# Patient Record
Sex: FEMALE | Race: BLACK OR AFRICAN AMERICAN | NOT HISPANIC OR LATINO | Employment: FULL TIME | URBAN - METROPOLITAN AREA
[De-identification: names, ages, dates, MRNs, and addresses within clinical notes are randomized per-mention and may not be internally consistent; named-entity substitution may affect disease eponyms.]

---

## 2018-01-16 NOTE — RESULT NOTES
Verified Results  * US PELVIS COMPLETE (TRANSABDOMINAL AND TRANSVAGINAL) [id number] 10:15AM Jalyn Dill     Test Name Result Flag Reference   US PELVIS COMPLETE (TRANSABDOMINAL AND TRANSVAGINAL) (Report)     PELVIC ULTRASOUND, COMPLETE     INDICATION: Left lower quadrant pain Patient is postmenopausal        COMPARISON: 12/15/2012     TECHNIQUE:  Transabdominal pelvic ultrasound was performed in sagittal and transverse planes with a curvilinear transducer  Additional transvaginal imaging was performed to better evaluate the endometrium and ovaries  Imaging included volumetric    sweeps as well as traditional still imaging technique  FINDINGS:     UTERUS:   The uterus is normal in position, measuring 6 5 x 2 5 x 3 5 cm  Contour and echotexture appear normal  0 8 cm fibroid in the fundus on the left  The cervix shows no suspicious abnormality  ENDOMETRIUM:    Normal caliber of 2 mm  Homogenous and normal in appearance  OVARIES/ADNEXA:   Right ovary: 1 5 x 0 9 x 1 2 cm  No suspicious right ovarian abnormality  Doppler flow within normal limits  Left ovary: 1 0 x 2 4 x 1 0 cm  No suspicious left ovarian abnormality  Doppler flow within normal limits  No suspicious adnexal mass or loculated collections  Minimal free fluid in the midline  IMPRESSION:      Small uterine fibroid  Minimal free fluid            Workstation performed: [id number]     Signed by:   Arianna Angel MD   3/16/16       Discussion/Summary   Amirah Valdez,   Your pelvic ultrasound shows a small uterine fibroid, but is otherwise normal    Dr Lorenza Hernandez

## 2018-01-17 NOTE — RESULT NOTES
Verified Results  * MAMMO SCREENING BILATERAL W CAD [id number] 09:46AM Rondi Running     Test Name Result Flag Reference   MAMMO SCREENING BILATERAL W CAD (Report)     Patient History:   Patient is postmenopausal    Patient's BMI is 25 1  Reason for exam: screening (asymptomatic)  Mammo Screening Bilateral W CAD: July 25, 2016 - Check In #:    [de-identified]   Bilateral CC and MLO view(s) were taken  Technologist: ALONA Hernandez   Prior study comparison: July 22, 2015, bilateral screening    mammogram performed at [place] [address]  April 11, 2014, bilateral screening mammogram performed at [place] [address]  February 3, 2012, bilateral    screening mammogram performed at [place] [address]  There are scattered fibroglandular densities  No new dominant    soft tissue mass, architectural distortion or suspicious    calcifications are noted  The skin and nipple structures are    within normal limits  No mammographic evidence of malignancy  No    significant changes when compared with prior studies  ASSESSMENT: BiRad:2 - Benign     Recommendation:   Routine screening mammogram of both breasts in 1 year  Analyzed by CAD     8-10% of cancers will be missed on mammography  Management of a    palpable abnormality must be based on clinical grounds  Patients   will be notified of their results via letter from our facility  Accredited by [employer] of [employer] and FDA       Transcription Location: [facility] 98: [id number]     Risk Value(s):   Tyrer-Cuzick 10 Year: 2 636%, Tyrer-Cuzick Lifetime: 6 897%,    Myriad Table: 1 5%, MARCELA 5 Year: 1 1%, NCI Lifetime: 5 5%   Signed by:   Jodee Murguia MD   7/25/16       Discussion/Summary   Rockey Denver,   Your mammogram is normal    Dr Ana Mullen

## 2019-05-17 ENCOUNTER — OFFICE VISIT (OUTPATIENT)
Dept: FAMILY MEDICINE CLINIC | Facility: CLINIC | Age: 64
End: 2019-05-17
Payer: COMMERCIAL

## 2019-05-17 VITALS
WEIGHT: 178 LBS | BODY MASS INDEX: 27.94 KG/M2 | HEIGHT: 67 IN | DIASTOLIC BLOOD PRESSURE: 64 MMHG | TEMPERATURE: 99.1 F | SYSTOLIC BLOOD PRESSURE: 126 MMHG | HEART RATE: 72 BPM | RESPIRATION RATE: 16 BRPM

## 2019-05-17 DIAGNOSIS — R73.01 IMPAIRED FASTING GLUCOSE: Primary | ICD-10-CM

## 2019-05-17 DIAGNOSIS — Z12.31 SCREENING MAMMOGRAM, ENCOUNTER FOR: ICD-10-CM

## 2019-05-17 DIAGNOSIS — Z13.6 SCREENING FOR CARDIOVASCULAR CONDITION: ICD-10-CM

## 2019-05-17 DIAGNOSIS — Z13.0 SCREENING FOR DEFICIENCY ANEMIA: ICD-10-CM

## 2019-05-17 PROCEDURE — 99213 OFFICE O/P EST LOW 20 MIN: CPT | Performed by: FAMILY MEDICINE

## 2019-05-17 PROCEDURE — 3008F BODY MASS INDEX DOCD: CPT | Performed by: FAMILY MEDICINE

## 2019-05-17 PROCEDURE — 1036F TOBACCO NON-USER: CPT | Performed by: FAMILY MEDICINE

## 2019-11-05 ENCOUNTER — OFFICE VISIT (OUTPATIENT)
Dept: FAMILY MEDICINE CLINIC | Facility: CLINIC | Age: 64
End: 2019-11-05

## 2019-11-05 ENCOUNTER — TELEPHONE (OUTPATIENT)
Dept: FAMILY MEDICINE CLINIC | Facility: CLINIC | Age: 64
End: 2019-11-05

## 2019-11-05 VITALS
HEART RATE: 68 BPM | TEMPERATURE: 98.5 F | WEIGHT: 178.4 LBS | SYSTOLIC BLOOD PRESSURE: 138 MMHG | DIASTOLIC BLOOD PRESSURE: 80 MMHG | OXYGEN SATURATION: 100 % | BODY MASS INDEX: 28 KG/M2 | RESPIRATION RATE: 16 BRPM | HEIGHT: 67 IN

## 2019-11-05 DIAGNOSIS — L29.9 ITCHY SCALP: Primary | ICD-10-CM

## 2019-11-05 PROCEDURE — 99213 OFFICE O/P EST LOW 20 MIN: CPT | Performed by: NURSE PRACTITIONER

## 2019-11-05 RX ORDER — HYDROXYZINE HYDROCHLORIDE 25 MG/1
TABLET, FILM COATED ORAL
Qty: 60 TABLET | Refills: 1 | Status: SHIPPED | OUTPATIENT
Start: 2019-11-05 | End: 2020-01-10

## 2019-11-05 RX ORDER — KETOCONAZOLE 20 MG/ML
1 SHAMPOO TOPICAL ONCE
Qty: 100 ML | Status: SHIPPED | OUTPATIENT
Start: 2019-11-05 | End: 2019-11-05

## 2019-11-05 RX ORDER — KETOCONAZOLE 20 MG/ML
1 SHAMPOO TOPICAL 2 TIMES WEEKLY
Qty: 120 ML | Refills: 0 | Status: SHIPPED | OUTPATIENT
Start: 2019-11-07 | End: 2020-02-03 | Stop reason: SDUPTHER

## 2019-11-05 NOTE — PROGRESS NOTES
Assessment/Plan:  If no improvement, will follow up with dermatologist     1  Itchy scalp  -     hydrOXYzine HCL (ATARAX) 25 mg tablet; Take one tablet 3 times a day as needed  -     Ambulatory referral to Dermatology; Future  -     ketoconazole (NIZORAL) 2 % shampoo; Apply 1 application topically 2 (two) times a week          BMI Counseling: Body mass index is 27 94 kg/m²  Discussed the patient's BMI with her  The BMI is above normal  Nutrition recommendations include reducing portion sizes, decreasing overall calorie intake, 3-5 servings of fruits/vegetables daily, reducing fast food intake, consuming healthier snacks, decreasing soda and/or juice intake, moderation in carbohydrate intake and increasing intake of lean protein  Patient Instructions:  Supportive care discussed and advised  Advised to RTO for any worsening and no improvement  Follow up for no improvement and worsening of conditions  Patient advised and educated when to see immediate medical care  Return if symptoms worsen or fail to improve  No future appointments  Subjective:      Patient ID: Jayleen Flood is a 60 y o  female  Chief Complaint   Patient presents with    Itchy Scalp     started 4 days ago, has already bought lice treatments but it doesnt go away  vfrma         Vitals:  /80   Pulse 68   Temp 98 5 °F (36 9 °C)   Resp 16   Ht 5' 7" (1 702 m)   Wt 80 9 kg (178 lb 6 4 oz)   SpO2 100%   BMI 27 94 kg/m²     HPI  Patient stated that started with itching of scalp about 4 days ago  Denies use of any new shampoo and products on hair  Wears hair wigs and denies use oe any new one  Stated that got so worried with itching that cut her hair short and use OTC lice treatment but still having itching  No lice witnessed          The following portions of the patient's history were reviewed and updated as appropriate: allergies, current medications, past family history, past medical history, past social history, past surgical history and problem list       Review of Systems   Constitutional: Negative  Respiratory: Negative  Cardiovascular: Negative  Skin:        As noted in HPI     Neurological: Negative  Objective:    Social History     Tobacco Use   Smoking Status Never Smoker   Smokeless Tobacco Never Used       Allergies: No Known Allergies      Current Outpatient Medications   Medication Sig Dispense Refill    hydrOXYzine HCL (ATARAX) 25 mg tablet Take one tablet 3 times a day as needed 60 tablet 1    [START ON 11/7/2019] ketoconazole (NIZORAL) 2 % shampoo Apply 1 application topically 2 (two) times a week 120 mL 0     No current facility-administered medications for this visit  Physical Exam   Constitutional: She is oriented to person, place, and time  She appears well-developed and well-nourished  Cardiovascular: Normal rate, regular rhythm and normal heart sounds  Pulmonary/Chest: Effort normal and breath sounds normal    Musculoskeletal: Normal range of motion  Neurological: She is alert and oriented to person, place, and time  Skin: Skin is warm and dry  No rash noted  Whole scalp inspected and no rash, dryness and flakes noted  No lice noted and hair is very short and sparse  Psychiatric: She has a normal mood and affect   Her behavior is normal  Judgment and thought content normal                    TEDDY Mujica

## 2019-11-05 NOTE — TELEPHONE ENCOUNTER
Spoke to Patient and she stated that she cut her hair because she thought she had lice  She stated that she did a lice treatment and nothing came out  She stated its still extremely itchy and wants to be seen by a provider  I stated that there are no doctors available however our nurse practioner are  She agreed to be seen by Marleni Marcus    Sending to Marleni Marcus for Jigar Freeman, [place]

## 2019-11-05 NOTE — TELEPHONE ENCOUNTER
Mercedez Sage is calling stating she has something going on in her head  She cut her hair, she doesn't think it lice  Triage since we have no evening apts

## 2019-12-22 ENCOUNTER — HOSPITAL ENCOUNTER (EMERGENCY)
Facility: HOSPITAL | Age: 64
Discharge: HOME | End: 2019-12-22
Attending: EMERGENCY MEDICINE | Admitting: EMERGENCY MEDICINE

## 2019-12-22 ENCOUNTER — APPOINTMENT (EMERGENCY)
Dept: RADIOLOGY | Facility: HOSPITAL | Age: 64
End: 2019-12-22

## 2019-12-22 VITALS
DIASTOLIC BLOOD PRESSURE: 69 MMHG | OXYGEN SATURATION: 99 % | HEART RATE: 58 BPM | TEMPERATURE: 98.6 F | RESPIRATION RATE: 18 BRPM | SYSTOLIC BLOOD PRESSURE: 166 MMHG

## 2019-12-22 DIAGNOSIS — R10.9 ABDOMINAL PAIN: Primary | ICD-10-CM

## 2019-12-22 DIAGNOSIS — M70.70 ILIOPSOAS BURSITIS: ICD-10-CM

## 2019-12-22 LAB
ALBUMIN SERPL BCP-MCNC: 3.5 G/DL (ref 3.5–5)
ALP SERPL-CCNC: 99 U/L (ref 46–116)
ALT SERPL W P-5'-P-CCNC: 33 U/L (ref 12–78)
ANION GAP SERPL CALCULATED.3IONS-SCNC: 3 MMOL/L (ref 4–13)
APTT PPP: 26 SECONDS (ref 23–37)
AST SERPL W P-5'-P-CCNC: 25 U/L (ref 5–45)
BACTERIA UR QL AUTO: ABNORMAL /HPF
BASOPHILS # BLD AUTO: 0.04 THOUSANDS/ΜL (ref 0–0.1)
BASOPHILS NFR BLD AUTO: 1 % (ref 0–1)
BILIRUB SERPL-MCNC: 0.2 MG/DL (ref 0.2–1)
BILIRUB UR QL STRIP: NEGATIVE
BUN SERPL-MCNC: 17 MG/DL (ref 5–25)
CALCIUM SERPL-MCNC: 8.6 MG/DL (ref 8.3–10.1)
CHLORIDE SERPL-SCNC: 108 MMOL/L (ref 100–108)
CLARITY UR: CLEAR
CO2 SERPL-SCNC: 29 MMOL/L (ref 21–32)
COLOR UR: YELLOW
CREAT SERPL-MCNC: 1.19 MG/DL (ref 0.6–1.3)
EOSINOPHIL # BLD AUTO: 0.21 THOUSAND/ΜL (ref 0–0.61)
EOSINOPHIL NFR BLD AUTO: 3 % (ref 0–6)
ERYTHROCYTE [DISTWIDTH] IN BLOOD BY AUTOMATED COUNT: 12.9 % (ref 11.6–15.1)
GFR SERPL CREATININE-BSD FRML MDRD: 57 ML/MIN/1.73SQ M
GLUCOSE SERPL-MCNC: 97 MG/DL (ref 65–140)
GLUCOSE UR STRIP-MCNC: NEGATIVE MG/DL
HCT VFR BLD AUTO: 32.1 % (ref 34.8–46.1)
HGB BLD-MCNC: 10.6 G/DL (ref 11.5–15.4)
HGB UR QL STRIP.AUTO: NEGATIVE
IMM GRANULOCYTES # BLD AUTO: 0.02 THOUSAND/UL (ref 0–0.2)
IMM GRANULOCYTES NFR BLD AUTO: 0 % (ref 0–2)
INR PPP: 0.97 (ref 0.91–1.09)
KETONES UR STRIP-MCNC: NEGATIVE MG/DL
LEUKOCYTE ESTERASE UR QL STRIP: ABNORMAL
LIPASE SERPL-CCNC: 145 U/L (ref 73–393)
LYMPHOCYTES # BLD AUTO: 2.84 THOUSANDS/ΜL (ref 0.6–4.47)
LYMPHOCYTES NFR BLD AUTO: 36 % (ref 14–44)
MCH RBC QN AUTO: 26.8 PG (ref 26.8–34.3)
MCHC RBC AUTO-ENTMCNC: 33 G/DL (ref 31.4–37.4)
MCV RBC AUTO: 81 FL (ref 82–98)
MONOCYTES # BLD AUTO: 0.65 THOUSAND/ΜL (ref 0.17–1.22)
MONOCYTES NFR BLD AUTO: 8 % (ref 4–12)
NEUTROPHILS # BLD AUTO: 4.23 THOUSANDS/ΜL (ref 1.85–7.62)
NEUTS SEG NFR BLD AUTO: 52 % (ref 43–75)
NITRITE UR QL STRIP: NEGATIVE
NON-SQ EPI CELLS URNS QL MICRO: ABNORMAL /HPF
NRBC BLD AUTO-RTO: 0 /100 WBCS
PH UR STRIP.AUTO: 5.5 [PH]
PLATELET # BLD AUTO: 214 THOUSANDS/UL (ref 149–390)
PMV BLD AUTO: 10.5 FL (ref 8.9–12.7)
POTASSIUM SERPL-SCNC: 4 MMOL/L (ref 3.5–5.3)
PROT SERPL-MCNC: 7.4 G/DL (ref 6.4–8.2)
PROT UR STRIP-MCNC: NEGATIVE MG/DL
PROTHROMBIN TIME: 10.5 SECONDS (ref 9.8–12)
RBC # BLD AUTO: 3.96 MILLION/UL (ref 3.81–5.12)
RBC #/AREA URNS AUTO: ABNORMAL /HPF
SODIUM SERPL-SCNC: 140 MMOL/L (ref 136–145)
SP GR UR STRIP.AUTO: 1.01 (ref 1–1.03)
UROBILINOGEN UR QL STRIP.AUTO: 0.2 E.U./DL
WBC # BLD AUTO: 7.99 THOUSAND/UL (ref 4.31–10.16)
WBC #/AREA URNS AUTO: ABNORMAL /HPF

## 2019-12-22 PROCEDURE — 85610 PROTHROMBIN TIME: CPT | Performed by: EMERGENCY MEDICINE

## 2019-12-22 PROCEDURE — 80053 COMPREHEN METABOLIC PANEL: CPT | Performed by: EMERGENCY MEDICINE

## 2019-12-22 PROCEDURE — 85025 COMPLETE CBC W/AUTO DIFF WBC: CPT | Performed by: EMERGENCY MEDICINE

## 2019-12-22 PROCEDURE — 99284 EMERGENCY DEPT VISIT MOD MDM: CPT

## 2019-12-22 PROCEDURE — 81001 URINALYSIS AUTO W/SCOPE: CPT | Performed by: EMERGENCY MEDICINE

## 2019-12-22 PROCEDURE — 85730 THROMBOPLASTIN TIME PARTIAL: CPT | Performed by: EMERGENCY MEDICINE

## 2019-12-22 PROCEDURE — 96361 HYDRATE IV INFUSION ADD-ON: CPT

## 2019-12-22 PROCEDURE — 83690 ASSAY OF LIPASE: CPT | Performed by: EMERGENCY MEDICINE

## 2019-12-22 PROCEDURE — 96360 HYDRATION IV INFUSION INIT: CPT

## 2019-12-22 PROCEDURE — 74177 CT ABD & PELVIS W/CONTRAST: CPT

## 2019-12-22 PROCEDURE — 36415 COLL VENOUS BLD VENIPUNCTURE: CPT | Performed by: EMERGENCY MEDICINE

## 2019-12-22 RX ADMIN — IOHEXOL 100 ML: 350 INJECTION, SOLUTION INTRAVENOUS at 21:11

## 2019-12-22 RX ADMIN — SODIUM CHLORIDE 1000 ML: 0.9 INJECTION, SOLUTION INTRAVENOUS at 20:13

## 2019-12-23 ENCOUNTER — VBI (OUTPATIENT)
Dept: FAMILY MEDICINE CLINIC | Facility: CLINIC | Age: 64
End: 2019-12-23

## 2019-12-23 ENCOUNTER — TELEPHONE (OUTPATIENT)
Dept: FAMILY MEDICINE CLINIC | Facility: CLINIC | Age: 64
End: 2019-12-23

## 2019-12-23 NOTE — ED NOTES
This nurse in the room to see pt, pt is resting comfortably in the stretcher at this time  Pt requested additional warm blankets which were provided  Pt alert and oriented  Pt updated on the status of her care        Berlin Serrano RN  12/22/19 [id number]

## 2019-12-23 NOTE — TELEPHONE ENCOUNTER
----- Message from Mignon Jain MA sent at 12/23/2019  1:04 PM EST -----  Regarding: ED f/up - meds refill  Please outreach to patient  During ED f/up she requested a refill for a medication that was prescribed for a visit 11/05/2019  She would like the hydroxyzine refilled  Patient advised if she is still having the problem she may have to be seen again before med refill, but I would send a message and ask  Her response was that her head is clean but when she stops taking the pill it feels like the itchiness wants to come back  Thank you

## 2019-12-23 NOTE — TELEPHONE ENCOUNTER
Called patient and explained to her that she will need to make a follow up appt with Dr Glo Feldman in order for her to get a refill  I asked patient how many pills she had left   She stated that she only has one pill left  Patient stated that she will call back to schedule  I stated that when she does call back to ask to schedule an appt with Dr Cinthia Oreilly  Patient agreed no further action  Sending to Dr Glo Feldman for [address], [address] [facility] [place]

## 2019-12-23 NOTE — ED PROVIDER NOTES
History  Chief Complaint   Patient presents with    Abdominal Pain     pt c/o lower abdominal pain for past couple days, getting worse, states feels like a stabbing pain     Patient states she had sudden onset of left lower quadrant abdominal pain which was sharp and crampy in nature starting several days ago  Is not associated with nausea vomiting diarrhea  There is no dysuria, no blood per rectum  She had no fever chills  Patient states the pain is worse with certain positions  She has been taking large doses of Tylenol and Advil with mild relief  Prior to Admission Medications   Prescriptions Last Dose Informant Patient Reported? Taking?   hydrOXYzine HCL (ATARAX) 25 mg tablet   No No   Sig: Take one tablet 3 times a day as needed   ketoconazole (NIZORAL) 2 % shampoo   No No   Sig: Apply 1 application topically 2 (two) times a week      Facility-Administered Medications: None       Past Medical History:   Diagnosis Date    H/O antinuclear antibodies     Urinary tract infection     last assessed: 10/24/2013       Past Surgical History:   Procedure Laterality Date    APPENDECTOMY         Family History   Problem Relation Age of Onset    Diabetes Mother      I have reviewed and agree with the history as documented  Social History     Tobacco Use    Smoking status: Never Smoker    Smokeless tobacco: Never Used   Substance Use Topics    Alcohol use: Not on file    Drug use: Not on file        Review of Systems   Constitutional: Negative for chills and fever  HENT: Negative for congestion  Respiratory: Negative for shortness of breath  Cardiovascular: Negative for chest pain  Gastrointestinal: Positive for abdominal pain  Negative for blood in stool, nausea and vomiting  Genitourinary: Negative for dysuria and hematuria  Musculoskeletal: Negative for back pain  Neurological: Negative for weakness and headaches  Hematological: Does not bruise/bleed easily  Psychiatric/Behavioral: Negative for confusion  All other systems reviewed and are negative  Physical Exam   Constitutional: She is oriented to person, place, and time  She appears well-developed and well-nourished  HENT:   Head: Normocephalic and atraumatic  Mouth/Throat: Oropharynx is clear and moist    Eyes: EOM are normal    Cardiovascular: Normal rate, regular rhythm and normal heart sounds  Pulmonary/Chest: Effort normal and breath sounds normal    Abdominal: Normal appearance and bowel sounds are normal  There is tenderness in the left lower quadrant  Neurological: She is alert and oriented to person, place, and time  Skin: Skin is warm and dry  Capillary refill takes less than 2 seconds  Psychiatric: She has a normal mood and affect  Her behavior is normal    Nursing note and vitals reviewed        Vital Signs  ED Triage Vitals [12/22/19 1926]   Temperature Pulse Respirations Blood Pressure SpO2   98 6 °F (37 °C) 65 18 154/68 100 %      Temp Source Heart Rate Source Patient Position - Orthostatic VS BP Location FiO2 (%)   Tympanic Monitor Sitting Left arm --      Pain Score       --           Vitals:    12/22/19 1926   BP: 154/68   Pulse: 65   Patient Position - Orthostatic VS: Sitting         Visual Acuity      ED Medications  Medications   sodium chloride 0 9 % bolus 1,000 mL (has no administration in time range)       Diagnostic Studies  Results Reviewed     Procedure Component Value Units Date/Time    CBC and differential [[id number]]     Lab Status:  No result Specimen:  Blood     Protime-INR [[id number]]     Lab Status:  No result Specimen:  Blood     APTT [[id number]]     Lab Status:  No result Specimen:  Blood     Comprehensive metabolic panel [[id number]]     Lab Status:  No result Specimen:  Blood     Lipase [[id number]]     Lab Status:  No result Specimen:  Blood     UA (URINE) with reflex to Scope [[id number]]  (Abnormal) Collected:  12/22/19 1946    Lab Status:  Final result Specimen: Urine, Clean Catch Updated:  12/22/19 1955     Color, UA Yellow     Clarity, UA Clear     Specific Gravity, UA 1 015     pH, UA 5 5     Leukocytes, UA Small     Nitrite, UA Negative     Protein, UA Negative mg/dl      Glucose, UA Negative mg/dl      Ketones, UA Negative mg/dl      Urobilinogen, UA 0 2 E U /dl      Bilirubin, UA Negative     Blood, UA Negative    Urine Microscopic [[id number]] Collected:  12/22/19 1946    Lab Status: In process Specimen:  Urine, Clean Catch Updated:  12/22/19 1955                 CT abdomen pelvis with contrast    (Results Pending)              Procedures  Procedures         ED Course                               MDM  Number of Diagnoses or Management Options  Diagnosis management comments: Patient is postmenopausal but has a known history of fibroids and possible history of ovarian cysts  Will CT scan the abdomen        Disposition  Final diagnoses:   None     ED Disposition     None      Follow-up Information    None         Patient's Medications   Discharge Prescriptions    No medications on file     No discharge procedures on file      ED Provider  Electronically Signed by           Ramón Da Silva MD  12/22/19 2000

## 2019-12-23 NOTE — TELEPHONE ENCOUNTER
Callie Norris    ED Visit Information     Ed visit date: 12/22/2019  Diagnosis Description: Abdominal pain, Illiopsoas bursitis  In Network? Yes [address] [facility]  Discharge status: Home  Discharged with meds ? No  Number of ED visits to date: 0  ED Severity:NA     Outreach Information    Outreach successful: Yes 1  Date letter mailed:NA  Date Finalized:12/23/2019    Care Coordination    Follow up appointment with pcp: no no  Transportation issues ? 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

## 2020-01-07 ENCOUNTER — TELEPHONE (OUTPATIENT)
Dept: FAMILY MEDICINE CLINIC | Facility: CLINIC | Age: 65
End: 2020-01-07

## 2020-01-10 ENCOUNTER — OFFICE VISIT (OUTPATIENT)
Dept: FAMILY MEDICINE CLINIC | Facility: CLINIC | Age: 65
End: 2020-01-10

## 2020-01-10 VITALS
HEIGHT: 67 IN | TEMPERATURE: 98.4 F | SYSTOLIC BLOOD PRESSURE: 132 MMHG | HEART RATE: 72 BPM | BODY MASS INDEX: 28.09 KG/M2 | DIASTOLIC BLOOD PRESSURE: 80 MMHG | RESPIRATION RATE: 16 BRPM | WEIGHT: 179 LBS

## 2020-01-10 DIAGNOSIS — L29.9 ITCHY SCALP: ICD-10-CM

## 2020-01-10 PROCEDURE — 99213 OFFICE O/P EST LOW 20 MIN: CPT | Performed by: NURSE PRACTITIONER

## 2020-01-10 RX ORDER — HYDROXYZINE HYDROCHLORIDE 25 MG/1
25 TABLET, FILM COATED ORAL 2 TIMES DAILY
Qty: 60 TABLET | Refills: 1 | Status: SHIPPED | OUTPATIENT
Start: 2020-01-10 | End: 2020-04-14 | Stop reason: ALTCHOICE

## 2020-02-03 ENCOUNTER — OFFICE VISIT (OUTPATIENT)
Dept: FAMILY MEDICINE CLINIC | Facility: CLINIC | Age: 65
End: 2020-02-03

## 2020-02-03 VITALS
BODY MASS INDEX: 27.62 KG/M2 | HEIGHT: 67 IN | HEART RATE: 61 BPM | RESPIRATION RATE: 16 BRPM | SYSTOLIC BLOOD PRESSURE: 140 MMHG | DIASTOLIC BLOOD PRESSURE: 80 MMHG | OXYGEN SATURATION: 98 % | WEIGHT: 176 LBS | TEMPERATURE: 99.2 F

## 2020-02-03 DIAGNOSIS — Z00.00 ANNUAL PHYSICAL EXAM: Primary | ICD-10-CM

## 2020-02-03 DIAGNOSIS — L29.9 ITCHY SCALP: ICD-10-CM

## 2020-02-03 DIAGNOSIS — Z13.6 SCREENING FOR CARDIOVASCULAR CONDITION: ICD-10-CM

## 2020-02-03 DIAGNOSIS — Z12.31 SCREENING MAMMOGRAM, ENCOUNTER FOR: ICD-10-CM

## 2020-02-03 DIAGNOSIS — D57.3 SICKLE CELL TRAIT (HCC): ICD-10-CM

## 2020-02-03 DIAGNOSIS — R73.01 IMPAIRED FASTING GLUCOSE: ICD-10-CM

## 2020-02-03 PROBLEM — D64.9 ANEMIA: Status: ACTIVE | Noted: 2020-02-03

## 2020-02-03 PROCEDURE — 99396 PREV VISIT EST AGE 40-64: CPT | Performed by: FAMILY MEDICINE

## 2020-02-03 RX ORDER — KETOCONAZOLE 20 MG/ML
1 SHAMPOO TOPICAL 2 TIMES WEEKLY
Qty: 120 ML | Refills: 3 | Status: SHIPPED | OUTPATIENT
Start: 2020-02-03 | End: 2020-03-31

## 2020-02-03 NOTE — PROGRESS NOTES
FAMILY PRACTICE HEALTH MAINTENANCE OFFICE VISIT  [facility] - [address]    NAME: Sera Lockwood  AGE: 60 y o  SEX: female  : 1955     DATE: 2/3/2020    Assessment and Plan     1  Annual physical exam    2  Impaired fasting glucose  Assessment & Plan:  Last fasting sugar was normal       3  Sickle cell trait [facility])  Assessment & Plan:  She reports that her blood count is stable  Reviewed CBC from 2019      4  Screening for cardiovascular condition  -     Lipid Panel with Direct LDL reflex; Future    5  Screening mammogram, encounter for  -     Mammo screening bilateral w cad; Future; Expected date: 2020    6  Itchy scalp  -     ketoconazole (NIZORAL) 2 % shampoo; Apply 1 application topically 2 (two) times a week      · Patient Counseling:   · Nutrition: Stressed importance of a well balanced diet, moderation of sodium/saturated fat, caloric balance and sufficient intake of fiber  · Exercise: Stressed the importance of regular exercise with a goal of 150 minutes per week  · Dental Health: Discussed daily flossing and brushing and regular dental visits     · Immunizations reviewed: she is going to get her Adacel at the pharmacy  · Discussed benefits of:  Screening labs, mammogram and cervical cancer sreening   BMI Counseling: Body mass index is 27 57 kg/m²  Discussed with patient's BMI with her  The BMI is above normal  Exercise recommendations include exercising 3-5 times per week  Return in about 1 year (around 2/3/2021) for Annual physical         Chief Complaint     Chief Complaint   Patient presents with    Physical Exam     [id number]       History of Present Illness     She is feeling well  She is not getting any more head itching with the hydroxyzine          Well Adult Physical   Patient here for a comprehensive physical exam       Diet and Physical Activity  Diet: well balanced diet  Exercise: frequently      Depression Screen  PHQ-9 Depression Screening    PHQ-9:    Frequency of the following problems over the past two weeks:       Little interest or pleasure in doing things:  0 - not at all  Feeling down, depressed, or hopeless:  0 - not at all  PHQ-2 Score:  0          General Health  Hearing: Normal:  bilateral  Vision: no vision problems  Dental: has dentures, no need for dentist    Reproductive Health  Post-menopausal       The following portions of the patient's history were reviewed and updated as appropriate: allergies, current medications, past family history, past medical history, past social history, past surgical history and problem list     Review of Systems     Review of Systems   Constitutional: Negative  Respiratory: Negative  Cardiovascular: Negative          Past Medical History     Past Medical History:   Diagnosis Date    H/O antinuclear antibodies     Urinary tract infection     last assessed: 10/24/2013       Past Surgical History     Past Surgical History:   Procedure Laterality Date    APPENDECTOMY         Social History     Social History     Socioeconomic History    Marital status: Single     Spouse name: None    Number of children: None    Years of education: None    Highest education level: None   Occupational History    None   Social Needs    Financial resource strain: None    Food insecurity:     Worry: None     Inability: None    Transportation needs:     Medical: None     Non-medical: None   Tobacco Use    Smoking status: Never Smoker    Smokeless tobacco: Never Used   Substance and Sexual Activity    Alcohol use: None    Drug use: None    Sexual activity: None   Lifestyle    Physical activity:     Days per week: None     Minutes per session: None    Stress: None   Relationships    Social connections:     Talks on phone: None     Gets together: None     Attends [employer] service: None     Active member of club or organization: None     Attends meetings of clubs or organizations: None     Relationship status: None    Intimate partner violence:     Fear of current or ex partner: None     Emotionally abused: None     Physically abused: None     Forced sexual activity: None   Other Topics Concern    None   Social History Narrative    None       Family History     Family History   Problem Relation Age of Onset    Diabetes Mother        Current Medications       Current Outpatient Medications:     hydrOXYzine HCL (ATARAX) 25 mg tablet, Take 1 tablet (25 mg total) by mouth 2 (two) times a day, Disp: 60 tablet, Rfl: 1    ketoconazole (NIZORAL) 2 % shampoo, Apply 1 application topically 2 (two) times a week, Disp: 120 mL, Rfl: 3     Allergies     No Known Allergies    Objective     /80   Pulse 61   Temp 99 2 °F (37 3 °C)   Resp 16   Ht 5' 7" (1 702 m)   Wt 79 8 kg (176 lb)   SpO2 98%   BMI 27 57 kg/m²      Physical Exam   Constitutional: She is oriented to person, place, and time  She appears well-developed and well-nourished  HENT:   Head: Normocephalic and atraumatic  Right Ear: External ear normal    Left Ear: External ear normal    Mouth/Throat: Oropharynx is clear and moist    Eyes: Pupils are equal, round, and reactive to light  Neck: Normal range of motion  Cardiovascular: Normal rate, regular rhythm and normal heart sounds  Exam reveals no friction rub  No murmur heard  Pulmonary/Chest: Effort normal and breath sounds normal  No respiratory distress  She has no wheezes  She has no rales  Abdominal: Soft  Bowel sounds are normal  She exhibits no distension and no mass  There is no tenderness  There is no rebound and no guarding  Musculoskeletal: Normal range of motion  She exhibits no edema  Neurological: She is alert and oriented to person, place, and time  She has normal reflexes  Skin: Skin is warm  Nursing note and vitals reviewed           Visual Acuity Screening    Right eye Left eye Both eyes   Without correction: 20/25 20/70 20/25   With correction:              Leti Cardoza Kim Goodwin, [address]

## 2020-03-13 ENCOUNTER — APPOINTMENT (EMERGENCY)
Dept: RADIOLOGY | Facility: HOSPITAL | Age: 65
End: 2020-03-13

## 2020-03-13 ENCOUNTER — HOSPITAL ENCOUNTER (EMERGENCY)
Facility: HOSPITAL | Age: 65
Discharge: HOME | End: 2020-03-13
Attending: EMERGENCY MEDICINE | Admitting: EMERGENCY MEDICINE

## 2020-03-13 VITALS
BODY MASS INDEX: 27.72 KG/M2 | WEIGHT: 177 LBS | OXYGEN SATURATION: 98 % | HEART RATE: 70 BPM | DIASTOLIC BLOOD PRESSURE: 66 MMHG | TEMPERATURE: 99.3 F | RESPIRATION RATE: 15 BRPM | SYSTOLIC BLOOD PRESSURE: 150 MMHG

## 2020-03-13 DIAGNOSIS — R07.9 CHEST PAIN: Primary | ICD-10-CM

## 2020-03-13 DIAGNOSIS — I49.3 PVC (PREMATURE VENTRICULAR CONTRACTION): ICD-10-CM

## 2020-03-13 LAB
ALBUMIN SERPL BCP-MCNC: 3.9 G/DL (ref 3.5–5)
ALP SERPL-CCNC: 103 U/L (ref 46–116)
ALT SERPL W P-5'-P-CCNC: 31 U/L (ref 12–78)
ANION GAP SERPL CALCULATED.3IONS-SCNC: 9 MMOL/L (ref 4–13)
AST SERPL W P-5'-P-CCNC: 17 U/L (ref 5–45)
BASOPHILS # BLD AUTO: 0.05 THOUSANDS/ΜL (ref 0–0.1)
BASOPHILS NFR BLD AUTO: 1 % (ref 0–1)
BILIRUB SERPL-MCNC: 0.3 MG/DL (ref 0.2–1)
BUN SERPL-MCNC: 8 MG/DL (ref 5–25)
CALCIUM SERPL-MCNC: 9.2 MG/DL (ref 8.3–10.1)
CHLORIDE SERPL-SCNC: 104 MMOL/L (ref 100–108)
CO2 SERPL-SCNC: 29 MMOL/L (ref 21–32)
CREAT SERPL-MCNC: 0.88 MG/DL (ref 0.6–1.3)
EOSINOPHIL # BLD AUTO: 0.16 THOUSAND/ΜL (ref 0–0.61)
EOSINOPHIL NFR BLD AUTO: 2 % (ref 0–6)
ERYTHROCYTE [DISTWIDTH] IN BLOOD BY AUTOMATED COUNT: 13.2 % (ref 11.6–15.1)
GFR SERPL CREATININE-BSD FRML MDRD: 81 ML/MIN/1.73SQ M
GLUCOSE SERPL-MCNC: 83 MG/DL (ref 65–140)
HCT VFR BLD AUTO: 34.1 % (ref 34.8–46.1)
HGB BLD-MCNC: 11.4 G/DL (ref 11.5–15.4)
IMM GRANULOCYTES # BLD AUTO: 0.02 THOUSAND/UL (ref 0–0.2)
IMM GRANULOCYTES NFR BLD AUTO: 0 % (ref 0–2)
LYMPHOCYTES # BLD AUTO: 2.82 THOUSANDS/ΜL (ref 0.6–4.47)
LYMPHOCYTES NFR BLD AUTO: 31 % (ref 14–44)
MCH RBC QN AUTO: 27.7 PG (ref 26.8–34.3)
MCHC RBC AUTO-ENTMCNC: 33.4 G/DL (ref 31.4–37.4)
MCV RBC AUTO: 83 FL (ref 82–98)
MONOCYTES # BLD AUTO: 0.64 THOUSAND/ΜL (ref 0.17–1.22)
MONOCYTES NFR BLD AUTO: 7 % (ref 4–12)
NEUTROPHILS # BLD AUTO: 5.28 THOUSANDS/ΜL (ref 1.85–7.62)
NEUTS SEG NFR BLD AUTO: 59 % (ref 43–75)
NRBC BLD AUTO-RTO: 0 /100 WBCS
PLATELET # BLD AUTO: 206 THOUSANDS/UL (ref 149–390)
PMV BLD AUTO: 11.2 FL (ref 8.9–12.7)
POTASSIUM SERPL-SCNC: 3.4 MMOL/L (ref 3.5–5.3)
PROT SERPL-MCNC: 8 G/DL (ref 6.4–8.2)
RBC # BLD AUTO: 4.11 MILLION/UL (ref 3.81–5.12)
SODIUM SERPL-SCNC: 142 MMOL/L (ref 136–145)
TROPONIN I SERPL-MCNC: <0.02 NG/ML
WBC # BLD AUTO: 8.97 THOUSAND/UL (ref 4.31–10.16)

## 2020-03-13 PROCEDURE — 36415 COLL VENOUS BLD VENIPUNCTURE: CPT

## 2020-03-13 PROCEDURE — 99285 EMERGENCY DEPT VISIT HI MDM: CPT | Performed by: EMERGENCY MEDICINE

## 2020-03-13 PROCEDURE — 85025 COMPLETE CBC W/AUTO DIFF WBC: CPT

## 2020-03-13 PROCEDURE — 99285 EMERGENCY DEPT VISIT HI MDM: CPT

## 2020-03-13 PROCEDURE — 71045 X-RAY EXAM CHEST 1 VIEW: CPT

## 2020-03-13 PROCEDURE — 80053 COMPREHEN METABOLIC PANEL: CPT

## 2020-03-13 PROCEDURE — 93005 ELECTROCARDIOGRAM TRACING: CPT

## 2020-03-13 PROCEDURE — 84484 ASSAY OF TROPONIN QUANT: CPT

## 2020-03-13 NOTE — ED PROVIDER NOTES
History  Chief Complaint   Patient presents with    Chest Pain     c/o slight chest discomfort and feeling like heart racing for a couple of days  concerned as it is not going away     HPI    This is 61 yo F who presents today with chest pain  Patient states slight chest discomfort and feels like heart racing for couple of days  States feels palpitations where skipped beat  States increased stress lately  No shortness of breath  No fevers  No weakness  States it has happened before but resolved  No cough  Impression; 61 yo F with chest pain  Will do cardiac workup  Patient had few PVC which she described is the chest pain she has been experiencing  Discussed infrequent pvc  Will do cardiac workup     Prior to Admission Medications   Prescriptions Last Dose Informant Patient Reported? Taking?   hydrOXYzine HCL (ATARAX) 25 mg tablet   No No   Sig: Take 1 tablet (25 mg total) by mouth 2 (two) times a day   Patient taking differently: Take 25 mg by mouth as needed    ketoconazole (NIZORAL) 2 % shampoo   No No   Sig: Apply 1 application topically 2 (two) times a week      Facility-Administered Medications: None       Past Medical History:   Diagnosis Date    H/O antinuclear antibodies     Urinary tract infection     last assessed: 10/24/2013       Past Surgical History:   Procedure Laterality Date    APPENDECTOMY         Family History   Problem Relation Age of Onset    Diabetes Mother      I have reviewed and agree with the history as documented  E-Cigarette/Vaping    E-Cigarette Use Never User      E-Cigarette/Vaping Substances     Social History     Tobacco Use    Smoking status: Never Smoker    Smokeless tobacco: Never Used   Substance Use Topics    Alcohol use: Not Currently    Drug use: Not Currently       Review of Systems   Constitutional: Negative  Negative for diaphoresis and fever  HENT: Negative  Respiratory: Negative  Negative for cough, shortness of breath and wheezing  Cardiovascular: Positive for chest pain and palpitations  Negative for leg swelling  Gastrointestinal: Negative for abdominal distention, abdominal pain, nausea and vomiting  Genitourinary: Negative  Musculoskeletal: Negative  Skin: Negative  Neurological: Negative  Psychiatric/Behavioral: Negative  All other systems reviewed and are negative  Physical Exam  Physical Exam   Constitutional: She is oriented to person, place, and time  She appears well-developed and well-nourished  HENT:   Head: Normocephalic and atraumatic  Eyes: Pupils are equal, round, and reactive to light  EOM are normal    Neck: Normal range of motion  Neck supple  Cardiovascular: Normal rate, regular rhythm and normal heart sounds  No murmur heard  Pulmonary/Chest: Effort normal and breath sounds normal  No stridor  No respiratory distress  Abdominal: Soft  Bowel sounds are normal  She exhibits no distension  There is no tenderness  There is no rebound and no guarding  Musculoskeletal: Normal range of motion  She exhibits no edema  Neurological: She is alert and oriented to person, place, and time  Skin: Skin is warm and dry  Capillary refill takes less than 2 seconds  No rash noted  Psychiatric: She has a normal mood and affect  Vitals reviewed        Vital Signs  ED Triage Vitals [03/13/20 1808]   Temperature Pulse Respirations Blood Pressure SpO2   99 3 °F (37 4 °C) 76 20 137/63 99 %      Temp Source Heart Rate Source Patient Position - Orthostatic VS BP Location FiO2 (%)   Tympanic Monitor Sitting Right arm --      Pain Score       1           Vitals:    03/13/20 1845 03/13/20 1915 03/13/20 1945 03/13/20 2015   BP: 146/67 142/62 144/65 150/66   Pulse: 72 72 72 70   Patient Position - Orthostatic VS:             Visual Acuity      ED Medications  Medications - No data to display    Diagnostic Studies  Results Reviewed     Procedure Component Value Units Date/Time    Troponin I [[id number]] (Normal) Collected:  03/13/20 1838    Lab Status:  Final result Specimen:  Blood from Arm, Left Updated:  03/13/20 1903     Troponin I <0 02 ng/mL     Comprehensive metabolic panel [[id number]]  (Abnormal) Collected:  03/13/20 1838    Lab Status:  Final result Specimen:  Blood from Arm, Left Updated:  03/13/20 1903     Sodium 142 mmol/L      Potassium 3 4 mmol/L      Chloride 104 mmol/L      CO2 29 mmol/L      ANION GAP 9 mmol/L      BUN 8 mg/dL      Creatinine 0 88 mg/dL      Glucose 83 mg/dL      Calcium 9 2 mg/dL      AST 17 U/L      ALT 31 U/L      Alkaline Phosphatase 103 U/L      Total Protein 8 0 g/dL      Albumin 3 9 g/dL      Total Bilirubin 0 30 mg/dL      eGFR 81 ml/min/1 73sq m     Narrative:       [place] guidelines for Chronic Kidney Disease (CKD):     Stage 1 with normal or high GFR (GFR > 90 mL/min/1 73 square meters)    Stage 2 Mild CKD (GFR = 60-89 mL/min/1 73 square meters)    Stage 3A Moderate CKD (GFR = 45-59 mL/min/1 73 square meters)    Stage 3B Moderate CKD (GFR = 30-44 mL/min/1 73 square meters)    Stage 4 Severe CKD (GFR = 15-29 mL/min/1 73 square meters)    Stage 5 End Stage CKD (GFR <15 mL/min/1 73 square meters)  Note: GFR calculation is accurate only with a steady state creatinine    CBC and differential [[id number]]  (Abnormal) Collected:  03/13/20 1838    Lab Status:  Final result Specimen:  Blood from Arm, Left Updated:  03/13/20 1844     WBC 8 97 Thousand/uL      RBC 4 11 Million/uL      Hemoglobin 11 4 g/dL      Hematocrit 34 1 %      MCV 83 fL      MCH 27 7 pg      MCHC 33 4 g/dL      RDW 13 2 %      MPV 11 2 fL      Platelets 769 Thousands/uL      nRBC 0 /100 WBCs      Neutrophils Relative 59 %      Immat GRANS % 0 %      Lymphocytes Relative 31 %      Monocytes Relative 7 %      Eosinophils Relative 2 %      Basophils Relative 1 %      Neutrophils Absolute 5 28 Thousands/µL      Immature Grans Absolute 0 02 Thousand/uL      Lymphocytes Absolute 2 82 Thousands/µL      Monocytes Absolute 0 64 Thousand/µL      Eosinophils Absolute 0 16 Thousand/µL      Basophils Absolute 0 05 Thousands/µL                  XR chest 1 view portable   Final Result by Marin Lowry DO (03/14 1411)      No acute cardiopulmonary disease  Workstation performed: [id number]                    Procedures  Procedures         ED Course  ED Course as of Mar 14 1529   Katherine Martin Mar 13, 2020   1840 Procedure Note: EKG  Date/Time: 03/13/20 6:41 PM   Performed by: Tony Ryder by: Delphine Dempsey   Indications / Diagnosis: CP  ECG reviewed by me, the ED Provider: yes   The EKG demonstrates:  Rhythm: normal sinus  Intervals: normal intervals  Axis: normal axis  QRS/Blocks: normal QRS  ST Changes: No acute ST Changes, no STD/[place]  HEART Risk Score      Most Recent Value   Heart Score Risk Calculator   History  0 Filed at: 03/14/2020 1529   ECG  0 Filed at: 03/14/2020 1529   Age  1 Filed at: 03/14/2020 1529   Risk Factors  0 Filed at: 03/14/2020 1529   Troponin  0 Filed at: 03/14/2020 1529   HEART Score  1 Filed at: 03/14/2020 1529                              MDM      Disposition  Final diagnoses:   Chest pain   PVC (premature ventricular contraction)     Time reflects when diagnosis was documented in both MDM as applicable and the Disposition within this note     Time User Action Codes Description Comment    3/13/2020  8:32 PM Manasa Balloon Add [R07 9] Chest pain     3/13/2020  8:32 PM Rodrigo Anderson 63 Add [I49 3] PVC (premature ventricular contraction)       ED Disposition     ED Disposition Condition Date/Time Comment    Discharge Stable Fri Mar 13, 2020  8:32 PM Cherry Cobb discharge to home/self care              Follow-up Information     Follow up With Specialties Details Why Contact Info Additional Information    [facility] Emergency Department Emergency Medicine  If symptoms worsen [address] [facility] [address]  [phone] [facility] ED, [place], [place], [address] [facility] Schedule an appointment as soon as possible for a visit   [address] [facility], [place] [address] [facility] [address] [facility] [place], [address], [address] [place], [place], [address] [facility] [place]          Discharge Medication List as of 3/13/2020  8:34 PM      CONTINUE these medications which have NOT CHANGED    Details   hydrOXYzine HCL (ATARAX) 25 mg tablet Take 1 tablet (25 mg total) by mouth 2 (two) times a day, Starting Fri 1/10/2020, Normal      ketoconazole (NIZORAL) 2 % shampoo Apply 1 application topically 2 (two) times a week, Starting Mon 2/3/2020, Normal           No discharge procedures on file      PDMP Review     None          ED Provider  Electronically Signed by           Nay Ball MD  03/14/20 4395

## 2020-03-14 NOTE — DISCHARGE INSTRUCTIONS
Your labwork was normal  Please followup with family doctor and if repeat symptoms cardiology   Severe symptoms come back to the ED

## 2020-03-19 LAB
ATRIAL RATE: 69 BPM
P AXIS: 55 DEGREES
PR INTERVAL: 144 MS
QRS AXIS: 19 DEGREES
QRSD INTERVAL: 66 MS
QT INTERVAL: 404 MS
QTC INTERVAL: 432 MS
T WAVE AXIS: 48 DEGREES
VENTRICULAR RATE: 69 BPM

## 2020-03-19 PROCEDURE — 93010 ELECTROCARDIOGRAM REPORT: CPT | Performed by: INTERNAL MEDICINE

## 2020-03-30 ENCOUNTER — TELEPHONE (OUTPATIENT)
Dept: FAMILY MEDICINE CLINIC | Facility: CLINIC | Age: 65
End: 2020-03-30

## 2020-03-30 ENCOUNTER — TELEMEDICINE (OUTPATIENT)
Dept: FAMILY MEDICINE CLINIC | Facility: CLINIC | Age: 65
End: 2020-03-30

## 2020-03-30 VITALS — BODY MASS INDEX: 27.78 KG/M2 | WEIGHT: 177 LBS | HEIGHT: 67 IN

## 2020-03-30 DIAGNOSIS — D57.3 SICKLE CELL TRAIT (HCC): Primary | ICD-10-CM

## 2020-03-30 DIAGNOSIS — B34.9 VIRAL ILLNESS: ICD-10-CM

## 2020-03-30 PROCEDURE — G2012 BRIEF CHECK IN BY MD/QHP: HCPCS | Performed by: FAMILY MEDICINE

## 2020-03-30 NOTE — PROGRESS NOTES
Virtual Regular Visit    Problem List Items Addressed This Visit     Sickle cell trait ([address] ) - Primary     Stable  Reviewed recent labs            Other Visit Diagnoses     Viral illness        new, supportive measures reviewed  she will have her work check her pressure  Reason for visit is dizziness     Encounter provider Armida Bettencourt DO    Provider located at [address]  [address] [facility]  [address]  [place] [address]      Recent Visits  No visits were found meeting these conditions  Showing recent visits within past 7 days and meeting all other requirements     Today's Visits  Date Type Provider Dept   03/30/20 Telephone [address]   03/30/20 [facility] today's visits and meeting all other requirements     Future Appointments  Date Type Provider Dept   03/30/20 Telephone [address]   Showing future appointments within next 150 days and meeting all other requirements        The patient was identified by name and date of birth  Krista Man was informed that this is a telemedicine visit and that the visit is being conducted through [employer] and patient was informed that this is not a secure, HIPAA-complaint platform  she agrees to proceed     My office door was closed  No one else was in the room  She acknowledged consent and understanding of privacy and security of the video platform  The patient has agreed to participate and understands they can discontinue the visit at any time  Patient is aware this is a billable service  Subjective  Krista Man is a 60 y o  female     She is getting hot and cold sweats and body aches  She feels better today than she does yesterday  She works in a nursing home  She has not been exposed to anyone with Corona virus  The patient's she works with have a cold going around       She was recently in the emergency room and had been checked out there  She has been getting her temperature checked every day when she does work  She has not had a fever  She feels like she is finding something off  Past Medical History:   Diagnosis Date    H/O antinuclear antibodies     Urinary tract infection     last assessed: 10/24/2013       Past Surgical History:   Procedure Laterality Date    APPENDECTOMY         Current Outpatient Medications   Medication Sig Dispense Refill    hydrOXYzine HCL (ATARAX) 25 mg tablet Take 1 tablet (25 mg total) by mouth 2 (two) times a day (Patient taking differently: Take 25 mg by mouth as needed ) 60 tablet 1    ketoconazole (NIZORAL) 2 % shampoo Apply 1 application topically 2 (two) times a week 120 mL 3     No current facility-administered medications for this visit  No Known Allergies    Review of Systems   Constitutional: Negative for fever  Respiratory: Negative for cough and shortness of breath  Neurological: Positive for dizziness  Negative for headaches  Physical Exam   Constitutional: She appears well-nourished  No distress  Pulmonary/Chest: Effort normal    Psychiatric: She has a normal mood and affect  Her behavior is normal  Judgment and thought content normal         I spent 14 minutes with the patient during this visit

## 2020-03-30 NOTE — TELEPHONE ENCOUNTER
----- Message from Magali Vazquez sent at 3/30/2020  4:29 AM EDT -----  Regarding: RE: Non-Urgent Medical Question  Hi Dr Gomez,  i haven't been feeling well , for a few days now,  i work at [employer] and they our temp every morning before reporting to the unit  I have  all the common feelings of a cold but no fever  We have been working extra short  every day  so i am wondering if it just that    ----- Message -----  From: Power Gutierrez DO  Sent: 2/4/2020  8:16 AM EST  To: Magali Vazquez  Subject: RE: Non-Urgent Medical Question  Good morning,  The screening is the code for screening for cholesterol  Power Gutierrez DO     ----- Message -----     From: Magali Vazquez     Sent: 2/3/2020  6:50 PM EST       To: Power Gutierrez DO  Subject: Non-Urgent Medical Question    Hi DR Sailaja Herron , why do i have to go for  screening for Cardiovascular  condition ?

## 2020-03-31 ENCOUNTER — APPOINTMENT (INPATIENT)
Dept: NON INVASIVE DIAGNOSTICS | Facility: HOSPITAL | Age: 65
DRG: 179 | End: 2020-03-31
Payer: OTHER GOVERNMENT

## 2020-03-31 ENCOUNTER — APPOINTMENT (EMERGENCY)
Dept: RADIOLOGY | Facility: HOSPITAL | Age: 65
DRG: 179 | End: 2020-03-31
Payer: OTHER GOVERNMENT

## 2020-03-31 ENCOUNTER — APPOINTMENT (INPATIENT)
Dept: RADIOLOGY | Facility: HOSPITAL | Age: 65
DRG: 179 | End: 2020-03-31
Payer: OTHER GOVERNMENT

## 2020-03-31 ENCOUNTER — HOSPITAL ENCOUNTER (INPATIENT)
Facility: HOSPITAL | Age: 65
LOS: 1 days | Discharge: HOME | DRG: 179 | End: 2020-04-01
Attending: EMERGENCY MEDICINE | Admitting: INTERNAL MEDICINE
Payer: OTHER GOVERNMENT

## 2020-03-31 DIAGNOSIS — R42 DIZZINESS: Primary | ICD-10-CM

## 2020-03-31 DIAGNOSIS — I63.9 CVA (CEREBRAL VASCULAR ACCIDENT) (HCC): ICD-10-CM

## 2020-03-31 PROBLEM — R65.10 SIRS (SYSTEMIC INFLAMMATORY RESPONSE SYNDROME) (HCC): Status: ACTIVE | Noted: 2020-03-31

## 2020-03-31 PROBLEM — R07.89 CHEST TIGHTNESS: Status: ACTIVE | Noted: 2020-03-31

## 2020-03-31 LAB
ALBUMIN SERPL BCP-MCNC: 3.7 G/DL (ref 3.5–5)
ALP SERPL-CCNC: 102 U/L (ref 46–116)
ALT SERPL W P-5'-P-CCNC: 53 U/L (ref 12–78)
ANION GAP SERPL CALCULATED.3IONS-SCNC: 8 MMOL/L (ref 4–13)
APTT PPP: 27 SECONDS (ref 23–37)
AST SERPL W P-5'-P-CCNC: 36 U/L (ref 5–45)
ATRIAL RATE: 85 BPM
BASOPHILS # BLD AUTO: 0.02 THOUSANDS/ΜL (ref 0–0.1)
BASOPHILS NFR BLD AUTO: 0 % (ref 0–1)
BILIRUB SERPL-MCNC: 0.3 MG/DL (ref 0.2–1)
BUN SERPL-MCNC: 9 MG/DL (ref 5–25)
CALCIUM SERPL-MCNC: 9.2 MG/DL (ref 8.3–10.1)
CHLORIDE SERPL-SCNC: 101 MMOL/L (ref 100–108)
CO2 SERPL-SCNC: 28 MMOL/L (ref 21–32)
CREAT SERPL-MCNC: 1.1 MG/DL (ref 0.6–1.3)
EOSINOPHIL # BLD AUTO: 0.01 THOUSAND/ΜL (ref 0–0.61)
EOSINOPHIL NFR BLD AUTO: 0 % (ref 0–6)
ERYTHROCYTE [DISTWIDTH] IN BLOOD BY AUTOMATED COUNT: 13.2 % (ref 11.6–15.1)
FLUAV RNA NPH QL NAA+PROBE: NORMAL
FLUBV RNA NPH QL NAA+PROBE: NORMAL
GFR SERPL CREATININE-BSD FRML MDRD: 62 ML/MIN/1.73SQ M
GLUCOSE SERPL-MCNC: 117 MG/DL (ref 65–140)
HCT VFR BLD AUTO: 37.7 % (ref 34.8–46.1)
HGB BLD-MCNC: 12.6 G/DL (ref 11.5–15.4)
IMM GRANULOCYTES # BLD AUTO: 0.01 THOUSAND/UL (ref 0–0.2)
IMM GRANULOCYTES NFR BLD AUTO: 0 % (ref 0–2)
INR PPP: 0.92 (ref 0.84–1.19)
LYMPHOCYTES # BLD AUTO: 1.28 THOUSANDS/ΜL (ref 0.6–4.47)
LYMPHOCYTES NFR BLD AUTO: 21 % (ref 14–44)
MCH RBC QN AUTO: 27.5 PG (ref 26.8–34.3)
MCHC RBC AUTO-ENTMCNC: 33.4 G/DL (ref 31.4–37.4)
MCV RBC AUTO: 82 FL (ref 82–98)
MONOCYTES # BLD AUTO: 0.52 THOUSAND/ΜL (ref 0.17–1.22)
MONOCYTES NFR BLD AUTO: 8 % (ref 4–12)
NEUTROPHILS # BLD AUTO: 4.36 THOUSANDS/ΜL (ref 1.85–7.62)
NEUTS SEG NFR BLD AUTO: 71 % (ref 43–75)
NRBC BLD AUTO-RTO: 0 /100 WBCS
P AXIS: 50 DEGREES
PLATELET # BLD AUTO: 183 THOUSANDS/UL (ref 149–390)
PMV BLD AUTO: 12 FL (ref 8.9–12.7)
POTASSIUM SERPL-SCNC: 3.4 MMOL/L (ref 3.5–5.3)
PR INTERVAL: 132 MS
PROT SERPL-MCNC: 8.2 G/DL (ref 6.4–8.2)
PROTHROMBIN TIME: 12.6 SECONDS (ref 11.6–14.5)
QRS AXIS: 15 DEGREES
QRSD INTERVAL: 62 MS
QT INTERVAL: 372 MS
QTC INTERVAL: 442 MS
RBC # BLD AUTO: 4.59 MILLION/UL (ref 3.81–5.12)
RSV RNA NPH QL NAA+PROBE: NORMAL
SODIUM SERPL-SCNC: 137 MMOL/L (ref 136–145)
T WAVE AXIS: 37 DEGREES
VENTRICULAR RATE: 85 BPM
WBC # BLD AUTO: 6.2 THOUSAND/UL (ref 4.31–10.16)

## 2020-03-31 PROCEDURE — 93005 ELECTROCARDIOGRAM TRACING: CPT

## 2020-03-31 PROCEDURE — 85025 COMPLETE CBC W/AUTO DIFF WBC: CPT | Performed by: EMERGENCY MEDICINE

## 2020-03-31 PROCEDURE — 96374 THER/PROPH/DIAG INJ IV PUSH: CPT

## 2020-03-31 PROCEDURE — 71045 X-RAY EXAM CHEST 1 VIEW: CPT

## 2020-03-31 PROCEDURE — 70551 MRI BRAIN STEM W/O DYE: CPT

## 2020-03-31 PROCEDURE — 36415 COLL VENOUS BLD VENIPUNCTURE: CPT | Performed by: EMERGENCY MEDICINE

## 2020-03-31 PROCEDURE — 85730 THROMBOPLASTIN TIME PARTIAL: CPT | Performed by: EMERGENCY MEDICINE

## 2020-03-31 PROCEDURE — 93010 ELECTROCARDIOGRAM REPORT: CPT | Performed by: INTERNAL MEDICINE

## 2020-03-31 PROCEDURE — 87040 BLOOD CULTURE FOR BACTERIA: CPT | Performed by: INTERNAL MEDICINE

## 2020-03-31 PROCEDURE — 87631 RESP VIRUS 3-5 TARGETS: CPT | Performed by: INTERNAL MEDICINE

## 2020-03-31 PROCEDURE — 93306 TTE W/DOPPLER COMPLETE: CPT | Performed by: INTERNAL MEDICINE

## 2020-03-31 PROCEDURE — 99284 EMERGENCY DEPT VISIT MOD MDM: CPT | Performed by: EMERGENCY MEDICINE

## 2020-03-31 PROCEDURE — 80053 COMPREHEN METABOLIC PANEL: CPT | Performed by: EMERGENCY MEDICINE

## 2020-03-31 PROCEDURE — 70496 CT ANGIOGRAPHY HEAD: CPT

## 2020-03-31 PROCEDURE — 70498 CT ANGIOGRAPHY NECK: CPT

## 2020-03-31 PROCEDURE — 99255 IP/OBS CONSLTJ NEW/EST HI 80: CPT | Performed by: PSYCHIATRY & NEUROLOGY

## 2020-03-31 PROCEDURE — 87081 CULTURE SCREEN ONLY: CPT | Performed by: INTERNAL MEDICINE

## 2020-03-31 PROCEDURE — 85610 PROTHROMBIN TIME: CPT | Performed by: EMERGENCY MEDICINE

## 2020-03-31 PROCEDURE — 99223 1ST HOSP IP/OBS HIGH 75: CPT | Performed by: INTERNAL MEDICINE

## 2020-03-31 PROCEDURE — 97163 PT EVAL HIGH COMPLEX 45 MIN: CPT

## 2020-03-31 PROCEDURE — 93306 TTE W/DOPPLER COMPLETE: CPT

## 2020-03-31 PROCEDURE — 99285 EMERGENCY DEPT VISIT HI MDM: CPT

## 2020-03-31 RX ORDER — ASPIRIN 81 MG/1
324 TABLET, CHEWABLE ORAL ONCE
Status: COMPLETED | OUTPATIENT
Start: 2020-03-31 | End: 2020-03-31

## 2020-03-31 RX ORDER — LANOLIN ALCOHOL/MO/W.PET/CERES
6 CREAM (GRAM) TOPICAL
Status: DISCONTINUED | OUTPATIENT
Start: 2020-03-31 | End: 2020-04-01 | Stop reason: HOSPADM

## 2020-03-31 RX ORDER — ONDANSETRON 2 MG/ML
4 INJECTION INTRAMUSCULAR; INTRAVENOUS ONCE
Status: COMPLETED | OUTPATIENT
Start: 2020-03-31 | End: 2020-03-31

## 2020-03-31 RX ORDER — MECLIZINE HYDROCHLORIDE 25 MG/1
25 TABLET ORAL ONCE
Status: COMPLETED | OUTPATIENT
Start: 2020-03-31 | End: 2020-03-31

## 2020-03-31 RX ORDER — MECLIZINE HYDROCHLORIDE 25 MG/1
25 TABLET ORAL EVERY 8 HOURS PRN
Status: DISCONTINUED | OUTPATIENT
Start: 2020-03-31 | End: 2020-04-01 | Stop reason: HOSPADM

## 2020-03-31 RX ORDER — ACETAMINOPHEN 325 MG/1
650 TABLET ORAL EVERY 6 HOURS PRN
Status: DISCONTINUED | OUTPATIENT
Start: 2020-03-31 | End: 2020-04-01 | Stop reason: HOSPADM

## 2020-03-31 RX ORDER — POTASSIUM CHLORIDE 20 MEQ/1
40 TABLET, EXTENDED RELEASE ORAL ONCE
Status: COMPLETED | OUTPATIENT
Start: 2020-03-31 | End: 2020-03-31

## 2020-03-31 RX ORDER — ACETAMINOPHEN 325 MG/1
650 TABLET ORAL EVERY 6 HOURS PRN
Status: DISCONTINUED | OUTPATIENT
Start: 2020-03-31 | End: 2020-03-31

## 2020-03-31 RX ORDER — ATORVASTATIN CALCIUM 40 MG/1
40 TABLET, FILM COATED ORAL EVERY EVENING
Status: DISCONTINUED | OUTPATIENT
Start: 2020-03-31 | End: 2020-04-01 | Stop reason: HOSPADM

## 2020-03-31 RX ORDER — ASPIRIN 325 MG
325 TABLET ORAL DAILY
Status: DISCONTINUED | OUTPATIENT
Start: 2020-04-01 | End: 2020-03-31 | Stop reason: ALTCHOICE

## 2020-03-31 RX ADMIN — POTASSIUM CHLORIDE 40 MEQ: 1500 TABLET, EXTENDED RELEASE ORAL at 17:23

## 2020-03-31 RX ADMIN — ENOXAPARIN SODIUM 40 MG: 40 INJECTION SUBCUTANEOUS at 12:32

## 2020-03-31 RX ADMIN — ACETAMINOPHEN 650 MG: 325 TABLET ORAL at 12:32

## 2020-03-31 RX ADMIN — ATORVASTATIN CALCIUM 40 MG: 40 TABLET, FILM COATED ORAL at 17:24

## 2020-03-31 RX ADMIN — MELATONIN 6 MG: at 21:12

## 2020-03-31 RX ADMIN — MECLIZINE HYDROCHLORIDE 25 MG: 25 TABLET ORAL at 05:53

## 2020-03-31 RX ADMIN — ONDANSETRON 4 MG: 2 INJECTION INTRAMUSCULAR; INTRAVENOUS at 07:47

## 2020-03-31 RX ADMIN — ASPIRIN 81 MG 324 MG: 81 TABLET ORAL at 07:33

## 2020-03-31 RX ADMIN — ACETAMINOPHEN 650 MG: 325 TABLET ORAL at 19:52

## 2020-03-31 RX ADMIN — IOHEXOL 85 ML: 350 INJECTION, SOLUTION INTRAVENOUS at 05:59

## 2020-03-31 NOTE — PHYSICAL THERAPY NOTE
PT EVALUATION       03/31/20 1325   Note Type   Note type Eval/Treat   Pain Assessment   Pain Assessment Tool Wells-Baker FACES   Wells-Baker FACES Pain Rating 4   Pain Location/Orientation   ("I feel achy all over")   Home Living   Type of [address] [place] [address] Two level;1/2 bath on main level;Bed/bath upstairs; Able to live on main level with bedroom/bathroom  (few steps to enter)   [address]   (none)   Prior Function   Level of [place] Independent with ADLs and functional mobility   Lives With Spouse   ADL Assistance Independent   Vocational   ([address])   Restrictions/Precautions   Other Precautions Pain; Fall Risk   General   Additional Pertinent History Pt admitted with a few day history of malaise, now with c/o dizziness and back pain  MRI brain negative for acute CVA  Cognition   Overall Cognitive Status WFL   RLE Assessment   RLE Assessment   (ROM WFL, MMT 4+/5)   LLE Assessment   LLE Assessment   (ROM WFL, MMT 4+/5)   Bed Mobility   Rolling R 5  Supervision   Rolling L 5  Supervision   Supine to Sit 5  Supervision   Sit to Supine 5  Supervision   Transfers   Sit to Stand 4  Minimal assistance   Stand to Sit 4  Minimal assistance   Ambulation/Elevation   Gait Assistance 4  Minimal assist   Assistive Device   (none)   Distance few steps to the head of the bed   Balance   Static Sitting Fair +   Dynamic Sitting Fair   Static Standing Fair   Dynamic Standing Fair   Ambulatory Fair -   Assessment   Prognosis Good   Problem List Decreased strength;Decreased endurance; Impaired balance;Decreased mobility   Assessment Patient seen for Physical Therapy evaluation  Patient admitted with Dizziness  Comorbidities affecting patient's physical performance include:sickle cell trait     Personal factors affecting patient at time of initial evaluation include: lives in 2 story house, stairs to enter home, inability to ambulate household distances, inability to navigate level surfaces without external assistance, inability to perform dynamic tasks in community, inability to perform current job functions and inability to perform ADLS  Prior to admission, patient was independent with functional mobility without assistive device, independent with ADLS and works full time  Please find objective findings from Physical Therapy assessment regarding body systems outlined above with impairments and limitations including weakness, impaired balance, pain, decreased activity tolerance, decreased functional mobility tolerance and fall risk  The Barthel Index was used as a functional outcome tool presenting with a score of 45 today indicating marked limitations of functional mobility and ADLS  Patient's clinical presentation is currently unstable/unpredictable as seen in patient's presentation of increased fall risk, new onset of impairment of functional mobility, decreased endurance and new onset of weakness  Pt would benefit from continued Physical Therapy treatment to address deficits as defined above and maximize level of functional mobility  As demonstrated by objective findings, the assigned level of complexity for this evaluation is high  Goals   Patient Goals "feel better"   STG Expiration Date 04/07/20   Short Term Goal #1 improve bed mobility to independent, improve transfers to supervision, pt will ambulate with supervision 30 feet with steady gait  LTG Expiration Date 04/14/20   Long Term Goal #1 pt will transfer independently, pt will ambulate 500 feet with no dizziness with a steady gait, independent up and down 10 steps  Plan   Treatment/Interventions ADL retraining;Functional transfer training;LE strengthening/ROM; Therapeutic exercise; Endurance training;Equipment eval/education; Bed mobility;Gait training;Patient/family training;Elevations   PT Frequency 5x/wk   Recommendation   Recommendation Home with family support   Modified [place] Scale   Modified [place] Scale 0   Barthel Index   Feeding 10 Bathing 0   Grooming Score 0   Dressing Score 5   Bladder Score 5   Bowels Score 10   Toilet Use Score 5   Transfers (Bed/Chair) Score 10   Mobility (Level Surface) Score 0   Stairs Score 0   Barthel Index Score 39   Licensure   [place] License Number  Micky PT [id number]

## 2020-03-31 NOTE — SPEECH THERAPY NOTE
Speech Language/Pathology  Speech/Language Pathology  Assessment    Patient Name: Magen Solano  NNREV'V Date: 3/31/2020     Problem List  Principal Problem:    Dizziness    Past Medical History  Past Medical History:   Diagnosis Date    H/O antinuclear antibodies     Urinary tract infection     last assessed: 10/24/2013     Past Surgical History  Past Surgical History:   Procedure Laterality Date    APPENDECTOMY         Pt is a 61 yo female admitted to [facility] 3/31/20 w/ dizziness  Patient reports that her symptoms started on Saturday 03/28/2020 where she started feeling ill  Patient reports that she went to work on Saturday, however had to call out sick on Sunday due to having body aches, hot and cold flashes, with chills, nausea and dizziness  Patient reports that the dizziness is worse with standing and exertion, reports can't walk across the floor without becoming dizzy  Patient denies any room spinning vertigo, however did note she had a slight headache which is either her she never has headaches  Patient denies headache at this time and cannot remember the location and the quality of the headache when she had it  Patient also denies any light sensitivities, shortness of breath, chest pain, abdominal pain, vomiting or diarrhea  Consult received for speech/swallow eval on stroke pathway  Pt passed nsg swallow screen; tolerating regular diet w/o s/s dysphagia or aspiration  No speech/language deficits reported  NIH score is 0  MRI: 3/31/20 No significant intracranial pathology identified     No need for formal speech/swallow eval at this time  Reconsult if needed      April Becky Carroll MA CCC-SLP  Speech Patholgist  [place] license # [id number]  [place] license # [id number]  Available via [employer]

## 2020-03-31 NOTE — ED PROVIDER NOTES
History  Chief Complaint   Patient presents with    Dizziness     pt states dizziness and feeling hot and cold since yesterday  States she took tylenol last night around 2000  States she went down the stairs to get something out of the dryer and developed back pain  Pt in the ER with c/o "dizziness" that began on Saturday and worsened yesterday  She is unable to describe her symptoms, but denies a room spinning sensation  +nausea, without vomiting  +chills, without vomiting  Pt also c/o low back pain that began when she bent down to pick something out of the dryer  She hasn't taken any meds for pain relief  History provided by:  Patient   used: No    Dizziness   Quality:  Unable to specify  Severity:  Mild  Onset quality:  Gradual  Timing:  Constant  Progression:  Worsening  Chronicity:  New  Context: bending over, eye movement, head movement and standing up    Relieved by:  Nothing  Worsened by:  Nothing  Ineffective treatments:  None tried  Associated symptoms: no diarrhea, no nausea, no shortness of breath and no vomiting    Risk factors: no hx of vertigo and no new medications        Prior to Admission Medications   Prescriptions Last Dose Informant Patient Reported? Taking?   hydrOXYzine HCL (ATARAX) 25 mg tablet More than a month at Unknown time  No No   Sig: Take 1 tablet (25 mg total) by mouth 2 (two) times a day   Patient taking differently: Take 25 mg by mouth as needed       Facility-Administered Medications: None       Past Medical History:   Diagnosis Date    H/O antinuclear antibodies     Urinary tract infection     last assessed: 10/24/2013       Past Surgical History:   Procedure Laterality Date    APPENDECTOMY         Family History   Problem Relation Age of Onset    Diabetes Mother      I have reviewed and agree with the history as documented      E-Cigarette/Vaping    E-Cigarette Use Never User      E-Cigarette/Vaping Substances     Social History     Tobacco Use    Smoking status: Never Smoker    Smokeless tobacco: Never Used   Substance Use Topics    Alcohol use: Not Currently     Frequency: Never     Binge frequency: Never    Drug use: Not Currently       Review of Systems   Constitutional: Negative for chills and fever  Respiratory: Negative for cough, chest tightness and shortness of breath  Gastrointestinal: Negative for abdominal pain, diarrhea, nausea and vomiting  Genitourinary: Negative for dysuria, frequency, hematuria and urgency  Musculoskeletal: Positive for back pain and gait problem  Negative for neck pain and neck stiffness  Neurological: Positive for dizziness  All other systems reviewed and are negative  Physical Exam  Physical Exam   Constitutional: She is oriented to person, place, and time  She appears well-developed and well-nourished  No distress  HENT:   Head: Normocephalic and atraumatic  Eyes: Pupils are equal, round, and reactive to light  Conjunctivae are normal    Neck: Normal range of motion  Neck supple  Cardiovascular: Normal rate, regular rhythm and normal heart sounds  No murmur heard  Pulmonary/Chest: Effort normal and breath sounds normal  No respiratory distress  Abdominal: Soft  Bowel sounds are normal  She exhibits no distension  There is no tenderness  Musculoskeletal: Normal range of motion  She exhibits no edema or deformity  Neurological: She is alert and oriented to person, place, and time  No cranial nerve deficit  Skin: Skin is warm and dry  No rash noted  She is not diaphoretic  No pallor  Psychiatric: She has a normal mood and affect  Her behavior is normal    Nursing note and vitals reviewed        Vital Signs  ED Triage Vitals   Temperature Pulse Respirations Blood Pressure SpO2   03/31/20 0423 03/31/20 0426 03/31/20 0426 03/31/20 0426 03/31/20 0426   99 °F (37 2 °C) 88 20 123/58 98 %      Temp Source Heart Rate Source Patient Position - Orthostatic VS BP Location FiO2 (%) 03/31/20 0423 03/31/20 0735 03/31/20 0735 03/31/20 0426 --   Oral Monitor Lying Right arm       Pain Score       03/31/20 0852       No Pain           Vitals:    04/01/20 0016 04/01/20 0532 04/01/20 0857 04/01/20 1144   BP: 119/58 117/59 115/56 111/58   Pulse: 83 92 95 95   Patient Position - Orthostatic VS:  Lying Lying Sitting         Visual Acuity  Visual Acuity      Most Recent Value   L Pupil Size (mm)  3          ED Medications  Medications   meclizine (ANTIVERT) tablet 25 mg (25 mg Oral Given 3/31/20 0553)   iohexol (OMNIPAQUE) 350 MG/ML injection (SINGLE-DOSE) 85 mL (85 mL Intravenous Given 3/31/20 0559)   aspirin chewable tablet 324 mg (324 mg Oral Given 3/31/20 0733)   ondansetron (ZOFRAN) injection 4 mg (4 mg Intravenous Given 3/31/20 0747)   potassium chloride (K-DUR,KLOR-CON) CR tablet 40 mEq (40 mEq Oral Given 3/31/20 1723)   sodium chloride 0 9 % bolus 2,000 mL (2,000 mL Intravenous New Bag 4/1/20 1134)       Diagnostic Studies  Results Reviewed     Procedure Component Value Units Date/Time    Protime-INR [[id number]]  (Normal) Collected:  03/31/20 0451    Lab Status:  Final result Specimen:  Blood from Arm, Left Updated:  03/31/20 0531     Protime 12 6 seconds      INR 0 92    APTT [[id number]]  (Normal) Collected:  03/31/20 0451    Lab Status:  Final result Specimen:  Blood from Arm, Left Updated:  03/31/20 0531     PTT 27 seconds     Comprehensive metabolic panel [[id number]]  (Abnormal) Collected:  03/31/20 0451    Lab Status:  Final result Specimen:  Blood from Arm, Left Updated:  03/31/20 0530     Sodium 137 mmol/L      Potassium 3 4 mmol/L      Chloride 101 mmol/L      CO2 28 mmol/L      ANION GAP 8 mmol/L      BUN 9 mg/dL      Creatinine 1 10 mg/dL      Glucose 117 mg/dL      Calcium 9 2 mg/dL      AST 36 U/L      ALT 53 U/L      Alkaline Phosphatase 102 U/L      Total Protein 8 2 g/dL      Albumin 3 7 g/dL      Total Bilirubin 0 30 mg/dL      eGFR 62 ml/min/1 73sq m     Narrative: National Kidney Disease Foundation guidelines for Chronic Kidney Disease (CKD):     Stage 1 with normal or high GFR (GFR > 90 mL/min/1 73 square meters)    Stage 2 Mild CKD (GFR = 60-89 mL/min/1 73 square meters)    Stage 3A Moderate CKD (GFR = 45-59 mL/min/1 73 square meters)    Stage 3B Moderate CKD (GFR = 30-44 mL/min/1 73 square meters)    Stage 4 Severe CKD (GFR = 15-29 mL/min/1 73 square meters)    Stage 5 End Stage CKD (GFR <15 mL/min/1 73 square meters)  Note: GFR calculation is accurate only with a steady state creatinine    CBC and differential [[id number]] Collected:  03/31/20 0451    Lab Status:  Final result Specimen:  Blood from Arm, Left Updated:  03/31/20 0508     WBC 6 20 Thousand/uL      RBC 4 59 Million/uL      Hemoglobin 12 6 g/dL      Hematocrit 37 7 %      MCV 82 fL      MCH 27 5 pg      MCHC 33 4 g/dL      RDW 13 2 %      MPV 12 0 fL      Platelets 063 Thousands/uL      nRBC 0 /100 WBCs      Neutrophils Relative 71 %      Immat GRANS % 0 %      Lymphocytes Relative 21 %      Monocytes Relative 8 %      Eosinophils Relative 0 %      Basophils Relative 0 %      Neutrophils Absolute 4 36 Thousands/µL      Immature Grans Absolute 0 01 Thousand/uL      Lymphocytes Absolute 1 28 Thousands/µL      Monocytes Absolute 0 52 Thousand/µL      Eosinophils Absolute 0 01 Thousand/µL      Basophils Absolute 0 02 Thousands/µL                  MRI brain wo contrast   Final Result by Chris Tomas MD (03/31 1242)      No significant intracranial pathology identified      Consistent with prior MRI            Workstation performed: [id number]         CTA head and neck with and without contrast   Final Result by Speedy Lorenzo DO (03/31 3076)      Focal area of low-density in the hieu (sagittal image 61, series 602), nonspecific but consider acute versus chronic ischemia depending on the clinical setting   MR of the brain may be of benefit for further evaluation at the discretion of the referring caregiver  No acute intracranial process is seen otherwise  No occlusion, severe stenosis or aneurysm of the major arteries of the head and neck  No stenosis within either internal carotid artery  Patent bilateral vertebral arteries  Degenerative changes of the cervical spine  Other findings as above  Workstation performed: [id number]         XR chest 1 view portable   Final Result by Holly Taylor MD (03/31 0533)      No acute cardiopulmonary disease  Workstation performed: [id number]                    Procedures  Procedures         ED Course                                 MDM  Number of Diagnoses or Management Options  CVA (cerebral vascular accident) [facility]):   Dizziness:   Diagnosis management comments: CT reviewed  Possible acute CVA  Will give a dose of aspirin and admit to [facility]       Amount and/or Complexity of Data Reviewed  Clinical lab tests: ordered and reviewed  Tests in the radiology section of CPT®: ordered and reviewed    Risk of Complications, Morbidity, and/or Mortality  Presenting problems: moderate  Diagnostic procedures: moderate  Management options: moderate    Patient Progress  Patient progress: stable        Disposition  Final diagnoses:   Dizziness   CVA (cerebral vascular accident) ([address] )     Time reflects when diagnosis was documented in both MDM as applicable and the Disposition within this note     Time User Action Codes Description Comment    3/31/2020  7:48 AM March  O Add [R42] Dizziness     3/31/2020  7:48 AM March  Add [I63 9] CVA (cerebral vascular accident) [facility])       ED Disposition     ED Disposition Condition Date/Time Comment    Admit Stable Tue Mar 31, 2020  7:46 AM Case was discussed with Dr Efren Aguilera and the patient's admission status was agreed to be Admission Status: inpatient status to the service of Dr Efren Aguilera           Follow-up Information    None         Discharge Medication List as of 4/1/2020  3:41 PM      START taking these medications    Details   meclizine (ANTIVERT) 25 mg tablet Take 1 tablet (25 mg total) by mouth every 8 (eight) hours as needed for dizziness, Starting Wed 4/1/2020, Normal         CONTINUE these medications which have NOT CHANGED    Details   hydrOXYzine HCL (ATARAX) 25 mg tablet Take 1 tablet (25 mg total) by mouth 2 (two) times a day, Starting Fri 1/10/2020, Normal           Outpatient Discharge Orders   Discharge Diet     Activity as tolerated       PDMP Review     None          ED Provider  Electronically Signed by           Aileen Kumari DO  04/02/20 42 DO Fide  04/05/20 1003

## 2020-03-31 NOTE — ASSESSMENT & PLAN NOTE
Patient complaining of some chest tightness  Chest x-ray was negative for any pneumonia  EKG showed nonspecific ST-T changes in the lateral leads  Initial troponin was negative  Monitor serial troponins  2D echo was performed which showed normal ejection fraction without any wall motion abnormalities

## 2020-03-31 NOTE — ASSESSMENT & PLAN NOTE
Patient complained of feeling alternatively cold and heart at home for the past 2 days  In the hospital, patient spiked a fever of 102 and was tachycardic  Chest x-ray showed no evidence of pneumonia  Flu swab and RSV were ordered which were negative  Check urinalysis to rule out UTI  Blood cultures x2 were ordered  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

## 2020-03-31 NOTE — ED NOTES
patient c/o having hot/cold flashes, denies pain and states the dizziness has decreased     Kirk Orourke RN  03/31/20 9774

## 2020-03-31 NOTE — H&P
H&P- Niko Charles 1955, 60 y o  female MRN: [id number]    Unit/Bed#: [address] [address] Encounter: [id number]    Primary Care Provider: Courtney Chester DO   Date and time admitted to hospital: 3/31/2020  4:20 AM        * Dizziness  Assessment & Plan  Patient presented with dizziness on lying down and also standing for the past 2 days    CT of the head and neck in the ED showed focal area of low density in the pounds which is nonspecific but consider acute versus chronic ischemia  Patient later underwent MRI of the brain which showed no evidence of acute infarction  Check orthostatic vital signs to rule out orthostatic hypotension  Patient will be monitored on telemetry to rule out any arrhythmias  Echo with bubble study showed mild concentric hypertrophy with normal ejection fraction, grade 1 diastolic dysfunction with normal bubble study without any evidence of right-to-left shunt  Check hemoglobin A1c and fasting lipid panel  Neurology was consulted and consult appreciated  Patient will be on aspirin and Lipitor for now      Chest tightness  Assessment & Plan  Patient complaining of some chest tightness  Chest x-ray was negative for any pneumonia  EKG showed nonspecific ST-T changes in the lateral leads  Initial troponin was negative  Monitor serial troponins  2D echo was performed which showed normal ejection fraction without any wall motion abnormalities    SIRS (systemic inflammatory response syndrome) (HCC)  Assessment & Plan  Patient complained of feeling alternatively cold and heart at home for the past 2 days  In the hospital, patient spiked a fever of 102 and was tachycardic  Chest x-ray showed no evidence of pneumonia  Flu swab and RSV were ordered which were negative  Check urinalysis to rule out UTI  Blood cultures x2 were ordered  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    Esophageal reflux  Assessment & Plan  Patient not any medications        VTE Prophylaxis: Enoxaparin (Lovenox)  / sequential compression device   Code Status: Level 1 - Full Code    Anticipated Length of Stay:  Patient will be admitted on an Inpatient basis with an anticipated length of stay of  more than 2 midnights  Justification for Hospital Stay:  Dizziness,    Total Time for Visit, including Counseling / Coordination of Care: 1 hour  Greater than 50% of this total time spent on direct patient counseling and coordination of care  Chief Complaint:   Dizziness (pt states dizziness and feeling hot and cold since yesterday  States she took tylenol last night around 2000  States she went down the stairs to get something out of the dryer and developed back pain   )      History of Present Illness:    Arturo Jeter is a 60 y o  female with a PMH of sickle cell trait, GERD who presents with dizziness for the past 3 days  Patient also reports that she is feeling alternatively cold and heart  Patient reports that she is dizzy especially when she stands up and sometimes on lying down  Patient had some tingling on the right side of face earlier this morning  Patient denies any weakness, double vision, trouble swallowing, speech problems  Patient will need to work on Saturday and then called out sick on Sunday  Patient continues to have dizziness and feeling of hot and cold  Due to persistent symptoms, patient present to the ED  Patient denies any fever, cough, shortness of breath  Patient also reports some headache  In the ED patient had CT of the head and neck which showed focal area of low density lesion in the hieu which is nonspecific  Later while on the floor patient spiked a fever of 102 6 with tachycardia  Review of Systems:    Review of Systems   Constitutional: Positive for chills  Negative for activity change, appetite change, diaphoresis, fatigue, fever and unexpected weight change     HENT: Negative for congestion, ear discharge, ear pain, facial swelling, hearing loss, mouth sores, nosebleeds, postnasal drip, rhinorrhea, sinus pressure, sneezing, sore throat, tinnitus, trouble swallowing and voice change  Eyes: Negative for photophobia, discharge, redness and visual disturbance  Respiratory: Positive for chest tightness  Negative for cough, shortness of breath, wheezing and stridor  Cardiovascular: Negative for chest pain, palpitations and leg swelling  Gastrointestinal: Negative for abdominal distention, abdominal pain, anal bleeding, blood in stool, constipation, diarrhea, nausea and vomiting  Endocrine: Negative for polydipsia, polyphagia and polyuria  Genitourinary: Negative for decreased urine volume, difficulty urinating, dysuria, flank pain, hematuria, menstrual problem, pelvic pain, urgency, vaginal bleeding and vaginal discharge  Musculoskeletal: Negative for arthralgias, back pain and neck stiffness  Skin: Negative for pallor and rash  Neurological: Positive for dizziness and headaches  Negative for seizures, facial asymmetry, speech difficulty, light-headedness and numbness  Hematological: Negative for adenopathy  Psychiatric/Behavioral: Negative for agitation and confusion  Past Medical and Surgical History:     Past Medical History:   Diagnosis Date    H/O antinuclear antibodies     Urinary tract infection     last assessed: 10/24/2013       Past Surgical History:   Procedure Laterality Date    APPENDECTOMY         Meds/Allergies:    Prior to Admission medications    Medication Sig Start Date End Date Taking?  Authorizing Provider   hydrOXYzine HCL (ATARAX) 25 mg tablet Take 1 tablet (25 mg total) by mouth 2 (two) times a day  Patient taking differently: Take 25 mg by mouth as needed  1/10/20   TEDDY Dhillon   ketoconazole (NIZORAL) 2 % shampoo Apply 1 application topically 2 (two) times a week 2/3/20 3/31/20  Darrell Peng DO       Allergies: No Known Allergies    Social History:     Marital Status: Single   Substance Use History:   Social History     Substance and Sexual Activity   Alcohol Use Not Currently    Frequency: Never    Binge frequency: Never     Social History     Tobacco Use   Smoking Status Never Smoker   Smokeless Tobacco Never Used     Social History     Substance and Sexual Activity   Drug Use Not Currently       Family History:    Family History   Problem Relation Age of Onset    Diabetes Mother        Physical Exam:     Vitals:   Blood Pressure: 122/58 (03/31/20 1400)  Pulse: 94 (03/31/20 1400)  Temperature: (!) 102 5 °F (39 2 °C) (03/31/20 1400)  Temp Source: Tympanic (03/31/20 1400)  Respirations: 18 (03/31/20 1400)  Height: 5' 8" (172 7 cm) (03/31/20 0317)  Weight - Scale: 79 7 kg (175 lb 11 2 oz) (03/31/20 0852)  SpO2: 95 % (03/31/20 1400)    Physical Exam   Constitutional: No distress  HENT:   Head: Normocephalic and atraumatic  Nose: Nose normal    Eyes: Pupils are equal, round, and reactive to light  Conjunctivae are normal    Neck: Normal range of motion  Neck supple  Cardiovascular: Normal rate, regular rhythm and normal heart sounds  Pulmonary/Chest: Effort normal and breath sounds normal  No respiratory distress  She has no wheezes  She has no rales  Abdominal: Soft  Bowel sounds are normal  She exhibits no distension  There is no tenderness  There is no rebound and no guarding  Musculoskeletal: She exhibits no edema  Neurological: She is alert  No cranial nerve deficit  Cranial nerves 2-12 grossly intact  Sensation is normal to light touch in all extremities  Motor strength is 5/5   Skin: Skin is warm and dry  No rash noted           Additional Data:     Lab Results: I have personally reviewed pertinent films in PACS    Results from last 7 days   Lab Units 03/31/20  0451   WBC Thousand/uL 6 20   HEMOGLOBIN g/dL 12 6   HEMATOCRIT % 37 7   PLATELETS Thousands/uL 183   NEUTROS PCT % 71     Results from last 7 days   Lab Units 03/31/20  0451   SODIUM mmol/L 137   POTASSIUM mmol/L 3 4* CHLORIDE mmol/L 101   CO2 mmol/L 28   BUN mg/dL 9   CREATININE mg/dL 1 10   CALCIUM mg/dL 9 2   TOTAL BILIRUBIN mg/dL 0 30   ALK PHOS U/L 102   ALT U/L 53   AST U/L 36     Results from last 7 days   Lab Units 03/31/20  0451   INR  0 92                   Imaging: I have personally reviewed pertinent films in PACS    MRI brain wo contrast   Final Result by Mae Billings MD (03/31 1242)      No significant intracranial pathology identified      Consistent with prior MRI            Workstation performed: [id number]         CTA head and neck with and without contrast   Final Result by Gregoria Teresa DO (03/31 2122)      Focal area of low-density in the hieu (sagittal image 61, series 602), nonspecific but consider acute versus chronic ischemia depending on the clinical setting  MR of the brain may be of benefit for further evaluation at the discretion of the referring    caregiver  No acute intracranial process is seen otherwise  No occlusion, severe stenosis or aneurysm of the major arteries of the head and neck  No stenosis within either internal carotid artery  Patent bilateral vertebral arteries  Degenerative changes of the cervical spine  Other findings as above  Workstation performed: [id number]         XR chest 1 view portable   Final Result by Edilson Valero MD (03/31 2133)      No acute cardiopulmonary disease  Workstation performed: [id number]             MRI brain wo contrast   Final Result      No significant intracranial pathology identified      Consistent with prior MRI            Workstation performed: [id number]         CTA head and neck with and without contrast   Final Result      Focal area of low-density in the hieu (sagittal image 61, series 602), nonspecific but consider acute versus chronic ischemia depending on the clinical setting   MR of the brain may be of benefit for further evaluation at the discretion of the referring caregiver  No acute intracranial process is seen otherwise  No occlusion, severe stenosis or aneurysm of the major arteries of the head and neck  No stenosis within either internal carotid artery  Patent bilateral vertebral arteries  Degenerative changes of the cervical spine  Other findings as above  Workstation performed: [id number]         XR chest 1 view portable   Final Result      No acute cardiopulmonary disease  Workstation performed: [id number]             EKG, Pathology, and Other Studies Reviewed on Admission:   EKG:  EKG shows normal sinus rhythm at 85 beats per minute with left atrial enlargement and nonspecific inferolateral ST-T changes  Allscripts / Epic Records Reviewed: Yes     ** Please Note: This note has been constructed using a voice recognition system   **

## 2020-03-31 NOTE — CONSULTS
Rol[address] 41   Neurology Initial Consult    Isiah Black is a 60 y o  female  [id number] [facility] [address]          Information obtained from:   Chief Complaint   Patient presents with    Dizziness     pt states dizziness and feeling hot and cold since yesterday  States she took tylenol last night around 2000  States she went down the stairs to get something out of the dryer and developed back pain  Assessment/Plan:  1  Dizziness  2  Acute viral illness  susana    -monitor on telemetry  -aspirin 325 mg daily  -lipitor 40 mg daily  -Antivert 25 mg q 8 hours p r n   -LDL/chol, A1C  -PT/OT   -MRI of the brain-no findings of significant intracranial pathology  -echocardiogram a shunt study    HPI:  Isiah Black this 72-year-old female who was brought to the ER with reports of ongoing dizziness since Saturday  Patient reports that she has no underlying medical history, has not had any issues of hypertension, diabetes, hyperlipidemia, history of stroke and does not smoke  Patient does report her sister has diabetes and was supposed to have her cholesterol checked however never did have the labs drawn  Patient reports that her symptoms started on Saturday 03/28/2020 where she started feeling ill  Patient reports that she went to work on Saturday, however had to call out sick on Sunday due to having body aches, hot and cold flashes, with chills, nausea and dizziness  Patient reports that the dizziness is worse with standing and exertion, reports can't walk across the floor without becoming dizzy  Patient denies any room spinning vertigo, however did note she had a slight headache which is either her she never has headaches  Patient denies headache at this time and cannot remember the location and the quality of the headache when she had it  Patient also denies any light sensitivities, shortness of breath, chest pain, abdominal pain, vomiting or diarrhea      Patient did have CTA of the head and neck in the ER, which found a focal area of low density in the hieu region which is nonspecific but could be considered for acute versus chronic ischemia  Patient denies any history of stroke that she is aware of  There are no other vascular occlusions or stenosis noted on CTA  Patient was admitted to telemetry under the stroke pathway for suspect ischemia  Receive full-dose aspirin and Lipitor the ER will continue on this through the stroke pathway  Patient also received Antivert in the ER which she reports helped with the dizziness  Currently patient does have some reports of mild dizziness, however reports is less than when she came in  Patient also reports having some mild nausea with some chest discomfort and upper chest/throat region  Denies having a any visual changes, of photo sensitivities, shortness of breath and cough, wheezing, abdominal pain, vomiting or diarrhea  Patient continues on stroke pathway, scheduled for MRI this morning read without any significant findings of intracranial pathology  Will continue on cholesterol/LDL levels as well as A1c to monitor for risk factors going forward  Patient remains on aspirin and Lipitor regimen with Antivert  p r n  Dizziness  Will assess orthostatic vital signs as well      Past Medical History:   Diagnosis Date    H/O antinuclear antibodies     Urinary tract infection     last assessed: 10/24/2013       Past Surgical History:   Procedure Laterality Date    APPENDECTOMY         No Known Allergies      Current Facility-Administered Medications:     [START ON 4/1/2020] aspirin tablet 325 mg, 325 mg, Oral, Daily, Latrice Alvarez MD    atorvastatin (LIPITOR) tablet 40 mg, 40 mg, Oral, QPM, Latrice Alvarez MD    enoxaparin (LOVENOX) subcutaneous injection 40 mg, 40 mg, Subcutaneous, Daily, Georgina Espitia MD    Social History     Socioeconomic History    Marital status: Single     Spouse name: Not on file    Number of children: Not on file    Years of education: Not on file    Highest education level: Not on file   Occupational History    Not on file   Social Needs    Financial resource strain: Not on file    Food insecurity:     Worry: Not on file     Inability: Not on file    Transportation needs:     Medical: Not on file     Non-medical: Not on file   Tobacco Use    Smoking status: Never Smoker    Smokeless tobacco: Never Used   Substance and Sexual Activity    Alcohol use: Not Currently    Drug use: Not Currently    Sexual activity: Not on file   Lifestyle    Physical activity:     Days per week: Not on file     Minutes per session: Not on file    Stress: Not on file   Relationships    Social connections:     Talks on phone: Not on file     Gets together: Not on file     Attends Buddhism service: Not on file     Active member of club or organization: Not on file     Attends meetings of clubs or organizations: Not on file     Relationship status: Not on file    Intimate partner violence:     Fear of current or ex partner: Not on file     Emotionally abused: Not on file     Physically abused: Not on file     Forced sexual activity: Not on file   Other Topics Concern    Not on file   Social History Narrative    Not on file       Family History   Problem Relation Age of Onset    Diabetes Mother          Review of systems:  Please see HPI for positive symptoms  No fever, no chills, no weight change  Ocular: No double vision, no blurred vision  HEENT:  No sore throat  or congestion  No neck pain  Reported slight headache , now resolved   COR:  No chest pain  No palpitations  Lungs:  no sob  GI:  +  nausea, no vomiting, no diarrhea, no constipation, no anorexia  :  No dysuria, frequency, or urgency  No hematuria  Musculoskeletal:  No joint pain or swelling   Skin:  No rash or itching  Psychiatric:  no anxiety, no depression  Endocrine:  No polyuria or polydipsia      Physical examination:  Vitals: 03/31/20 0852   BP: 105/56   Pulse: 89   Resp: 20   Temp: 98 9 °F (37 2 °C)   SpO2: 95%       GENERAL APPEARANCE:  The patient is alert, oriented  HEENT:  Head is normocephalic  The sinuses are nontender  Pupils are equal and reactive  NECK:  Supple without lymphadenopathy  HEART:  Regular rate and rhythm  LUNGS:  clear to auscultation  No crackles or wheezes are heard  ABDOMEN:  Soft, nontender, nondistended with good bowel sounds heard  EXTREMITIES:  Without cyanosis, clubbing or edema  Mental status: The patient is alert, attentive, and oriented x4  Speech is clear and fluent, good repetition, comprehension, and naming  Cranial nerves:  CN II: Visual fields are full to confrontation  Fundoscopic exam deferred at this time, Pupils are 3 mm and briskly reactive to light  CN III, IV, VI: At primary gaze, there is no eye deviation  CN V: Facial sensation is intact to pinprick in all 3 divisions bilaterally  Corneal responses are intact  CN VII: Face is symmetric with normal eye closure and smile  CN VIII: Hearing is normal to rubbing fingers  CN IX, X: Palate elevates symmetrically  Phonation is normal   CN XI: Head turning and shoulder shrug are intact  CN XII: Tongue is midline with normal movements and no atrophy  Motor: There is no pronator drift of out-stretched arms  Muscle bulk and tone are normal      Muscle exam  Arm Right Left Leg Right Left   Deltoid 5/5 5/5 Iliopsoas 5/5 5/5   Biceps 5/5 5/5 Quads 5/5 5/5   Triceps 5/5 5/5 Hamstrings 5/5 5/5   Wrist Extension   Ankle Dorsi Flexion 5/5 5/5   Wrist Flexion   Ankle Plantar Flexion 5/5 5/5   Interossei   Ankle Eversion     APB   Ankle Inversion         Reflexes   RJ BJ TJ KJ AJ Plantars Garcia's   Right 2+ 2+ 2+ 2+ 2+ Downgoing Not present   Left 2+ 2+ 2+ 2+ 2+ Downgoing Not present     Sensory:  Light touch, temperature, position sense, and vibration sense are intact in fingers and toes    Coordination:  Rapid alternating movements and fine finger movements are intact  There is no dysmetria on finger-to-nose and heel-knee-shin  There are no abnormal or extraneous movements  Romberg deferred due to dizziness, fall risk  Gait/Stance:  Deferred heel/toe walking and tandem gait due to dizziness, fall risk  Lab Results   Component Value Date    WBC 6 20 03/31/2020    HGB 12 6 03/31/2020    HCT 37 7 03/31/2020    MCV 82 03/31/2020     03/31/2020     No results found for: HGBA1C  Lab Results   Component Value Date    ALT 53 03/31/2020    AST 36 03/31/2020    ALKPHOS 102 03/31/2020     Lab Results   Component Value Date    CALCIUM 9 2 03/31/2020    K 3 4 (L) 03/31/2020    CO2 28 03/31/2020     03/31/2020    BUN 9 03/31/2020    CREATININE 1 10 03/31/2020       Review of reports and notes reveal:    Independent Interpretation of images or specimens:  MRI result date 03/31/2020  No significant intracranial pathology identified    Cta Head And Neck With And Without Contrast Result Date: 3/31/2020  Focal area of low-density in the hieu (sagittal image 61, series 602), nonspecific but consider acute versus chronic ischemia depending on the clinical setting  MR of the brain may be of benefit for further evaluation at the discretion of the referring caregiver  No acute intracranial process is seen otherwise  No occlusion, severe stenosis or aneurysm of the major arteries of the head and neck  No stenosis within either internal carotid artery  Patent bilateral vertebral arteries  Degenerative changes of the cervical spine  Other findings as above  Xr Chest 1 View Portable Result Date: 3/31/2020  No acute cardiopulmonary disease  Thank you for this consult  Total time of encounter: 70  More than 50% of time was spent in counseling and coordination of care of patient

## 2020-03-31 NOTE — ASSESSMENT & PLAN NOTE
Patient presented with dizziness on lying down and also standing for the past 2 days    CT of the head and neck in the ED showed focal area of low density in the pounds which is nonspecific but consider acute versus chronic ischemia  Patient later underwent MRI of the brain which showed no evidence of acute infarction  Check orthostatic vital signs to rule out orthostatic hypotension  Patient will be monitored on telemetry to rule out any arrhythmias  Echo with bubble study showed mild concentric hypertrophy with normal ejection fraction, grade 1 diastolic dysfunction with normal bubble study without any evidence of right-to-left shunt  Check hemoglobin A1c and fasting lipid panel  Neurology was consulted and consult appreciated  Patient will be on aspirin and Lipitor for now

## 2020-03-31 NOTE — PLAN OF CARE
Problem: Potential for Falls  Goal: Patient will remain free of falls  Description  INTERVENTIONS:  - Assess patient frequently for physical needs  -  Identify cognitive and physical deficits and behaviors that affect risk of falls    -  [place] fall precautions as indicated by assessment   - Educate patient/family on patient safety including physical limitations  - Instruct patient to call for assistance with activity based on assessment  - Modify environment to reduce risk of injury     Outcome: Progressing     Problem: PAIN - ADULT  Goal: Verbalizes/displays adequate comfort level or baseline comfort level  Description  Interventions:  - Encourage patient to monitor pain and request assistance  - Assess pain using appropriate pain scale  - Administer analgesics based on type and severity of pain and evaluate response  - Implement non-pharmacological measures as appropriate and evaluate response  - Consider cultural and social influences on pain and pain management  - Notify physician/advanced practitioner if interventions unsuccessful or patient reports new pain  Outcome: Progressing     Problem: INFECTION - ADULT  Goal: Absence or prevention of progression during hospitalization  Description  INTERVENTIONS:  - Assess and monitor for signs and symptoms of infection  - Monitor lab/diagnostic results  - Monitor all insertion sites, i e  indwelling lines, tubes, and drains      - Administer medications as ordered  - Instruct and encourage patient and family to use good hand hygiene technique  - Identify and instruct in appropriate isolation precautions for identified infection/condition   Outcome: Progressing  Goal: Absence of fever/infection during neutropenic period  Description  INTERVENTIONS:  - Monitor WBC    Outcome: Progressing     Problem: SAFETY ADULT  Goal: Patient will remain free of falls  Description  INTERVENTIONS:  - Assess patient frequently for physical needs  -  Identify cognitive and physical deficits and behaviors that affect risk of falls    -  [place] fall precautions as indicated by assessment   - Educate patient/family on patient safety including physical limitations  - Instruct patient to call for assistance with activity based on assessment  - Modify environment to reduce risk of injury     Outcome: Progressing  Goal: Maintain or return to baseline ADL function  Description  INTERVENTIONS:  -  Assess patient's ability to carry out ADLs; assess patient's baseline for ADL function and identify physical deficits which impact ability to perform ADLs (bathing, care of mouth/teeth, toileting, grooming, dressing, etc )  - Assess/evaluate cause of self-care deficits   - Assess range of motion  - Assess patient's mobility; develop plan if impaired  - Assess patient's need for assistive devices and provide as appropriate  - Encourage maximum independence but intervene and supervise when necessary  - Involve family in performance of ADLs  - Assess for home care needs following discharge     - Provide patient education as appropriate   Outcome: Progressing  Goal: Maintain or return mobility status to optimal level  Description  INTERVENTIONS:  - Assess patient's baseline mobility status (ambulation, transfers, stairs, etc )    - Identify cognitive and physical deficits and behaviors that affect mobility  - Identify mobility aids required to assist with transfers and/or ambulation (gait belt, sit-to-stand, lift, walker, cane, etc )  - [place] fall precautions as indicated by assessment  - Record patient progress and toleration of activity level on Mobility SBAR; progress patient to next Phase/Stage  - Instruct patient to call for assistance with activity based on assessment  - Consider rehabilitation consult to assist with strengthening/weightbearing, etc   Outcome: Progressing     Problem: DISCHARGE PLANNING  Goal: Discharge to home or other facility with appropriate resources  Description  INTERVENTIONS:  - Identify barriers to discharge w/patient and caregiver  - Arrange for needed discharge resources and transportation as appropriate  - Identify discharge learning needs (meds, wound care, etc )  - Arrange for interpretive services to assist at discharge as needed  - Refer to Case Management Department for coordinating discharge planning if the patient needs post-hospital services based on physician/advanced practitioner order or complex needs related to functional status, cognitive ability, or social support system  Outcome: Progressing     Problem: Knowledge Deficit  Goal: Patient/family/caregiver demonstrates understanding of disease process, treatment plan, medications, and discharge instructions  Description  Complete learning assessment and assess knowledge base    Interventions:  - Provide teaching at level of understanding  - Provide teaching via preferred learning methods  Outcome: Progressing

## 2020-04-01 VITALS
BODY MASS INDEX: 26.63 KG/M2 | WEIGHT: 175.7 LBS | HEART RATE: 95 BPM | OXYGEN SATURATION: 97 % | TEMPERATURE: 99.4 F | SYSTOLIC BLOOD PRESSURE: 111 MMHG | RESPIRATION RATE: 20 BRPM | DIASTOLIC BLOOD PRESSURE: 58 MMHG | HEIGHT: 68 IN

## 2020-04-01 LAB
ANION GAP SERPL CALCULATED.3IONS-SCNC: 9 MMOL/L (ref 4–13)
BILIRUB UR QL STRIP: NEGATIVE
BUN SERPL-MCNC: 10 MG/DL (ref 5–25)
CALCIUM SERPL-MCNC: 8.8 MG/DL (ref 8.3–10.1)
CHLORIDE SERPL-SCNC: 99 MMOL/L (ref 100–108)
CHOLEST SERPL-MCNC: 145 MG/DL (ref 50–200)
CLARITY UR: CLEAR
CO2 SERPL-SCNC: 25 MMOL/L (ref 21–32)
COLOR UR: YELLOW
CREAT SERPL-MCNC: 1.12 MG/DL (ref 0.6–1.3)
EST. AVERAGE GLUCOSE BLD GHB EST-MCNC: 120 MG/DL
GFR SERPL CREATININE-BSD FRML MDRD: 61 ML/MIN/1.73SQ M
GLUCOSE SERPL-MCNC: 112 MG/DL (ref 65–140)
GLUCOSE UR STRIP-MCNC: NEGATIVE MG/DL
HBA1C MFR BLD: 5.8 %
HDLC SERPL-MCNC: 45 MG/DL
HGB UR QL STRIP.AUTO: NEGATIVE
KETONES UR STRIP-MCNC: ABNORMAL MG/DL
LDLC SERPL CALC-MCNC: 87 MG/DL (ref 0–100)
LEUKOCYTE ESTERASE UR QL STRIP: NEGATIVE
NITRITE UR QL STRIP: NEGATIVE
PH UR STRIP.AUTO: 6 [PH]
POTASSIUM SERPL-SCNC: 3.8 MMOL/L (ref 3.5–5.3)
PROT UR STRIP-MCNC: NEGATIVE MG/DL
SODIUM SERPL-SCNC: 133 MMOL/L (ref 136–145)
SP GR UR STRIP.AUTO: 1.01 (ref 1–1.03)
TRIGL SERPL-MCNC: 67 MG/DL
TROPONIN I SERPL-MCNC: <0.02 NG/ML
UROBILINOGEN UR QL STRIP.AUTO: 0.2 E.U./DL

## 2020-04-01 PROCEDURE — 81003 URINALYSIS AUTO W/O SCOPE: CPT | Performed by: INTERNAL MEDICINE

## 2020-04-01 PROCEDURE — 80048 BASIC METABOLIC PNL TOTAL CA: CPT | Performed by: INTERNAL MEDICINE

## 2020-04-01 PROCEDURE — 83036 HEMOGLOBIN GLYCOSYLATED A1C: CPT | Performed by: INTERNAL MEDICINE

## 2020-04-01 PROCEDURE — 84484 ASSAY OF TROPONIN QUANT: CPT | Performed by: INTERNAL MEDICINE

## 2020-04-01 PROCEDURE — 80061 LIPID PANEL: CPT | Performed by: INTERNAL MEDICINE

## 2020-04-01 PROCEDURE — 97110 THERAPEUTIC EXERCISES: CPT

## 2020-04-01 PROCEDURE — 87635 SARS-COV-2 COVID-19 AMP PRB: CPT | Performed by: INTERNAL MEDICINE

## 2020-04-01 PROCEDURE — 99239 HOSP IP/OBS DSCHRG MGMT >30: CPT | Performed by: INTERNAL MEDICINE

## 2020-04-01 RX ORDER — MECLIZINE HYDROCHLORIDE 25 MG/1
25 TABLET ORAL EVERY 8 HOURS PRN
Qty: 30 TABLET | Refills: 0 | Status: SHIPPED | OUTPATIENT
Start: 2020-04-01 | End: 2020-04-07 | Stop reason: ALTCHOICE

## 2020-04-01 RX ADMIN — SODIUM CHLORIDE 2000 ML: 0.9 INJECTION, SOLUTION INTRAVENOUS at 11:34

## 2020-04-01 RX ADMIN — ENOXAPARIN SODIUM 40 MG: 40 INJECTION SUBCUTANEOUS at 08:45

## 2020-04-01 RX ADMIN — MECLIZINE HYDROCHLORIDE 25 MG: 25 TABLET ORAL at 09:49

## 2020-04-01 RX ADMIN — ACETAMINOPHEN 650 MG: 325 TABLET ORAL at 09:48

## 2020-04-01 NOTE — ASSESSMENT & PLAN NOTE
Patient complained of feeling alternatively cold and heart at home for the past 2 days  In the hospital, patient spiked a fever of 102 and was tachycardic  Chest x-ray showed no evidence of pneumonia  Flu swab and RSV were ordered which were negative  UA was unremarkable  Blood cultures x2 were ordered which are pending  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  Patient was discharged home to be quarantined for the next 14 days

## 2020-04-01 NOTE — NURSING NOTE
Discharge and medication instructions reviewed with patient  Pt instructed to stay self-quarantined until she is made aware of the  pending COVID-19 results  Pt understood the information  Pt discharged from the unit wearing a mask and taken to the lobby

## 2020-04-01 NOTE — PHYSICAL THERAPY NOTE
PT TREATMENT  Patient seen by PT, no skilled OT services required at this time  Patient is independent with dressing and ADLs and has family assist at home  DC OT     04/01/20 1110   Pain Assessment   Pain Assessment Tool Wells-Baker FACES   Wells-Baker FACES Pain Rating 4  (generalized ache as per patient)   Restrictions/Precautions   Other Precautions Fall Risk  (lightheadedness)   General   Chart Reviewed Yes   Family/Caregiver Present No   Cognition   Arousal/Participation Cooperative   Subjective   Subjective patient reports feeling dizzy but better   Bed Mobility   Supine to Sit 7  Independent   Transfers   Sit to Stand 7  Independent   Stand to Sit 7  Independent   Toilet transfer   (patient toileted for urination independently)   Additional Comments patient required supervision only for standing at sink for hand washing due to lightheadedness as per patient but improved since earlier this morning   Ambulation/Elevation   Gait Assistance 5  Supervision   Additional items Verbal cues   Assistive Device   (none)   Distance 50 feet with several changes in direction, slow gait patterning and antalgic at times  education completed in possible use of assistive device and patient declined stating "I will be fine"   Balance   Static Sitting Good   Dynamic Sitting Fair +   Static Standing Fair   Dynamic Standing Fair   Ambulatory Fair -   Exercises   Hip Flexion Sitting;20 reps;Bilateral   Knee AROM Long Arc Quad Sitting;20 reps;Bilateral   Ankle Pumps Sitting;20 reps;Bilateral   Marching Standing;15 reps;Bilateral   Balance training  standing balance exercise completed with sidestepping and backward walking, sit to and from stand completed for strengthening and balance training as well    Assessment   Assessment patient cooperative and motivated with continued dizziness at times and improved balance and gait endurance   Patient will benefit from increasing functional mobility   Plan   Treatment/Interventions ADL retraining;Functional transfer training;LE strengthening/ROM; Therapeutic exercise; Endurance training;Patient/family training;Equipment eval/education;Gait training; Compensatory technique education   PT Frequency 5x/wk   Recommendation   Recommendation Home with family support   Licensure   [place] License Number  Valeri Leyden PT [id number]

## 2020-04-01 NOTE — DISCHARGE SUMMARY
Discharge- Patricia Comes 1955, 60 y o  female MRN: [id number]    Unit/Bed#: [address] [address] Encounter: [id number]    Primary Care Provider: Velasquez Bowden DO   Date and time admitted to hospital: 3/31/2020  4:20 AM        * Dizziness  Assessment & Plan  Patient presented with dizziness on lying down and also standing for the past 2 days  CT of the head and neck in the ED showed focal area of low density in the pounds which is nonspecific but consider acute versus chronic ischemia  Patient later underwent MRI of the brain which showed no evidence of acute infarction  Patient's orthostatic vital signs were negative  Patient will be monitored on telemetry to rule out any arrhythmias  Echo with bubble study showed mild concentric hypertrophy with normal ejection fraction, grade 1 diastolic dysfunction with normal bubble study without any evidence of right-to-left shunt  Hemoglobin A1c was 5 6    Total cholesterol 145 and LDL 87  Neurology was consulted and consult appreciated  Patient was initially aspirin and Lipitor which will be discontinued  Dizziness likely secondary to viral illness with some dehydration  Patient was given 2 liters normal saline bolus today      SIRS (systemic inflammatory response syndrome) ([address] )  Assessment & Plan  Patient complained of feeling alternatively cold and heart at home for the past 2 days  In the hospital, patient spiked a fever of 102 and was tachycardic  Chest x-ray showed no evidence of pneumonia  Flu swab and RSV were ordered which were negative  UA was unremarkable  Blood cultures x2 were ordered which are pending  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  Patient was discharged home to be quarantined for the next 14 days    Chest tightness  Assessment & Plan  Patient complaining of some chest tightness  Chest x-ray was negative for any pneumonia  EKG showed nonspecific ST-T changes in the lateral leads  Patient's troponin was negative  2D echo was performed which showed normal ejection fraction without any wall motion abnormalities  Likely related to viral syndrome    Esophageal reflux  Assessment & Plan  Patient not any medications    Addendum on April 2, 2020-patient's COVID-19 result was positive and patient has COVID-19 infection    Discharging Physician / Practitioner: Eliza Powers MD  PCP: Deepak Gerber DO  Admission Date: 3/31/2020  Discharge Date: 04/01/20    Reason for Admission: Dizziness (pt states dizziness and feeling hot and cold since yesterday  States she took tylenol last night around 2000  States she went down the stairs to get something out of the dryer and developed back pain   )        Resolved Problems  Date Reviewed: 4/1/2020    None          Consultations During Hospital Stay:  [address] TO CASE MANAGEMENT  IP CONSULT TO NUTRITION SERVICES    Procedures Performed:     · Echo showed mild concentric hypertrophy with grade 1 diastolic dysfunction with grossly normal valve function with mild TR    Significant Findings / Test Results:     ·   Results from last 7 days   Lab Units 03/31/20  0451   WBC Thousand/uL 6 20   HEMOGLOBIN g/dL 12 6   PLATELETS Thousands/uL 183     Results from last 7 days   Lab Units 04/01/20  0537 03/31/20  0451   SODIUM mmol/L 133* 137   POTASSIUM mmol/L 3 8 3 4*   CHLORIDE mmol/L 99* 101   CO2 mmol/L 25 28   BUN mg/dL 10 9   CREATININE mg/dL 1 12 1 10   CALCIUM mg/dL 8 8 9 2   TOTAL BILIRUBIN mg/dL  --  0 30   ALK PHOS U/L  --  102   ALT U/L  --  53   AST U/L  --  36     Results from last 7 days   Lab Units 03/31/20  0451   INR  0 92     Results from last 7 days   Lab Units 04/01/20  0537   TROPONIN I ng/mL <0 02     Lab Results   Component Value Date/Time    HGBA1C 5 8 (H) 04/01/2020 05:37 AM             Blood Culture:   Lab Results   Component Value Date    BLOODCX Received in Microbiology Lab  Culture in Progress   03/31/2020    BLOODCX Received in Microbiology Lab  Culture in Progress  03/31/2020     Urine Culture: No results found for: URINECX  Sputum Culture: No components found for: SPUTUMCX  Wound Culture: No results found for: WOUNDCULT     MRI brain wo contrast   Final Result by Kaitlin Lundberg MD (03/31 1242)      No significant intracranial pathology identified      Consistent with prior MRI            Workstation performed: [id number]         CTA head and neck with and without contrast   Final Result by Jaydon Lundberg DO (03/31 2118)      Focal area of low-density in the hieu (sagittal image 61, series 602), nonspecific but consider acute versus chronic ischemia depending on the clinical setting  MR of the brain may be of benefit for further evaluation at the discretion of the referring    caregiver  No acute intracranial process is seen otherwise  No occlusion, severe stenosis or aneurysm of the major arteries of the head and neck  No stenosis within either internal carotid artery  Patent bilateral vertebral arteries  Degenerative changes of the cervical spine  Other findings as above  Workstation performed: [id number]         XR chest 1 view portable   Final Result by Theo Delatorre MD (03/31 4115)      No acute cardiopulmonary disease  Workstation performed: [id number]                Outpatient Tests Requested:  · Follow-up outpatient with PCP    Complications:  None    Reason for Admission:   Chief Complaint   Patient presents with    Dizziness     pt states dizziness and feeling hot and cold since yesterday  States she took tylenol last night around 2000  States she went down the stairs to get something out of the dryer and developed back pain  Hospital Course:     Per HPI: Nena Guevara is a 60 y o  female patient with a PMH of GERD, sickle cell trait who originally presented to the hospital on 3/31/2020 due to dizziness and feeling of cold alternating with heat  Patient had CT scan of the brain in the ED which was suspicious for pontine stroke and patient was admitted hospital   Patient had an MRI of the brain which was negative  Patient was also seen by Neurology  Patient's orthostatic vital signs were negative and patient received IV fluid boluses  Later patient spiked a fever and septic workup was done  Patient's chest x-ray, UA, flu swab and RSV were all negative  Blood cultures are pending  Patient as patient works in the [address] [place] [address] [employer] was ordered which is pending before discharge  Patient advised to stay home quarantined for the next 14 days  Hospital Course:    Please see above list of diagnoses and related plan for additional information  Condition at Discharge: stable       Discharge Day Visit / Exam:     Subjective:    Vitals: Blood Pressure: 111/58 (04/01/20 1144)  Pulse: 95 (04/01/20 1144)  Temperature: 99 4 °F (37 4 °C) (04/01/20 1144)  Temp Source: Oral (04/01/20 1144)  Respirations: 20 (04/01/20 1144)  Height: 5' 8" (172 7 cm) (03/31/20 5196)  Weight - Scale: 79 7 kg (175 lb 11 2 oz) (03/31/20 0852)  SpO2: 97 % (04/01/20 1144)  Exam:   Physical Exam   Constitutional: No distress  HENT:   Head: Normocephalic and atraumatic  Nose: Nose normal    Eyes: Pupils are equal, round, and reactive to light  Conjunctivae are normal    Neck: Normal range of motion  Neck supple  Cardiovascular: Normal rate, regular rhythm and normal heart sounds  Pulmonary/Chest: Effort normal and breath sounds normal  No respiratory distress  She has no wheezes  She has no rales  Abdominal: Soft  Bowel sounds are normal  She exhibits no distension  There is no tenderness  There is no rebound and no guarding  Musculoskeletal: She exhibits no edema  Neurological: She is alert  No cranial nerve deficit  Skin: Skin is warm and dry  No rash noted           Discharge instructions/Information to patient and family:   See after visit summary for information provided to patient and family  Provisions for Follow-Up Care:  See after visit summary for information related to follow-up care and any pertinent home health orders  Disposition:     Home    Planned Readmission: no     Discharge Statement:  I spent 30 minutes discharging the patient  This time was spent on the day of discharge  I had direct contact with the patient on the day of discharge  Greater than 50% of the total time was spent examining patient, answering all patient questions, arranging and discussing plan of care with patient as well as directly providing post-discharge instructions  Additional time then spent on discharge activities  Discharge Medications:  See after visit summary for reconciled discharge medications provided to patient and family        ** Please Note: This note has been constructed using a voice recognition system **

## 2020-04-01 NOTE — PHYSICIAN ADVISOR
Current patient class: Inpatient  The patient is currently on Hospital Day: 2 at [address] [place] [address]      The patient was admitted to the hospital at [phone] on 3/31/20 for the following diagnosis:  Dizziness [R42]  CVA (cerebral vascular accident) ([facility] [place] [facility] [address] ) [I63 9]       There is documentation in the medical record of an expected length of stay of at least 2 midnights  The patient is therefore expected to satisfy the 2 midnight benchmark and given the 2 midnight presumption is appropriate for INPATIENT ADMISSION  Given this expectation of a satisfying stay, CMS instructs us that the patient is most often appropriate for inpatient admission under part A provided medical necessity is documented in the chart  After review of the relevant documentation, labs, vital signs and test results, the patient is appropriate for INPATIENT ADMISSION  Admission to the hospital as an inpatient is a complex decision making process which requires the practitioner to consider the patients presenting complaint, history and physical examination and all relevant testing  With this in mind, in this case, the patient was deemed appropriate for INPATIENT ADMISSION  After review of the documentation and testing available at the time of the admission I concur with this clinical determination of medical necessity  Rationale is as follows: The patient is a 60 yrs old Female who presented to the ED at 3/31/2020  4:20 AM with a chief complaint of Dizziness (pt states dizziness and feeling hot and cold since yesterday  States she took tylenol last night around 2000  States she went down the stairs to get something out of the dryer and developed back pain   ) Patient presented with dizziness on lying down and also standing for the past 2 days  CT of the head and neck in the ED showed focal area of low density in the pounds which is nonspecific but consider acute versus chronic ischemia  MRI of the brain showed no evidence of acute infarction  Patient will be monitored on telemetry to rule out any arrhythmias  3/31/20 Echo with bubble study showed mild concentric hypertrophy with normal ejection fraction, grade 1 diastolic dysfunction with normal bubble study without any evidence of right-to-left shunt  Neurology was consulted and following    Patient also with Chest x-ray was negative for any pneumonia but positive SIRS criteria In the hospital, patient spiked a fever of 102 and was tachycardic  Flu swab and RSV were ordered which were negative  UA and Blood cultures x2, and 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  She has required oxygen by [place]      The patients vitals on arrival were ED Triage Vitals   Temperature Pulse Respirations Blood Pressure SpO2   03/31/20 0423 03/31/20 0426 03/31/20 0426 03/31/20 0426 03/31/20 0426   99 °F (37 2 °C) 88 20 123/58 98 %      Temp Source Heart Rate Source Patient Position - Orthostatic VS BP Location FiO2 (%)   03/31/20 0423 03/31/20 0735 03/31/20 0735 03/31/20 0426 --   Oral Monitor Lying Right arm       Pain Score       03/31/20 0852       No Pain           Past Medical History:   Diagnosis Date    H/O antinuclear antibodies     Urinary tract infection     last assessed: 10/24/2013     Past Surgical History:   Procedure Laterality Date    APPENDECTOMY             Consults have been placed to:   IP CONSULT TO NEUROLOGY  IP CONSULT TO CASE MANAGEMENT  IP CONSULT TO NUTRITION SERVICES    Vitals:    04/01/20 0532 04/01/20 0857 04/01/20 0948 04/01/20 1144   BP: 117/59 115/56  111/58   BP Location: Right arm Right arm  Right arm   Pulse: 92 95  95   Resp: 20 21  20   Temp: 100 2 °F (37 9 °C) 100 3 °F (37 9 °C) 99 6 °F (37 6 °C) 99 4 °F (37 4 °C)   TempSrc: Oral Oral  Oral   SpO2: 93% 94%  97%   Weight:       Height:           Most recent labs:    Recent Labs     03/31/20  0451 04/01/20  0537   WBC 6 20  --    HGB 12 6  --    HCT 37 7  --      --    K 3 4* 3 8   CALCIUM 9 2 8 8 BUN 9 10   CREATININE 1 10 1 12   INR 0 92  --    TROPONINI  --  <0 02   AST 36  --    ALT 53  --    ALKPHOS 102  --        Scheduled Meds:  Current Facility-Administered Medications:  acetaminophen 650 mg Oral Q6H PRN Rita Ram, DO    atorvastatin 40 mg Oral QPM Latrice Alvarez MD    enoxaparin 40 mg Subcutaneous Daily Latrice Alvarez MD    meclizine 25 mg Oral Q8H PRN TEDDY Birch    melatonin 6 mg Oral HS Rita Ram, DO    sodium chloride 2,000 mL Intravenous Once Madi Granados MD Last Rate: 2,000 mL (04/01/20 1134)     Continuous Infusions:   PRN Meds:   acetaminophen    meclizine    Surgical procedures (if appropriate):

## 2020-04-01 NOTE — ASSESSMENT & PLAN NOTE
Patient complaining of some chest tightness  Chest x-ray was negative for any pneumonia  EKG showed nonspecific ST-T changes in the lateral leads  Patient's troponin was negative  2D echo was performed which showed normal ejection fraction without any wall motion abnormalities  Likely related to viral syndrome

## 2020-04-01 NOTE — PLAN OF CARE
Problem: Potential for Falls  Goal: Patient will remain free of falls  Description  INTERVENTIONS:  - Assess patient frequently for physical needs  -  Identify cognitive and physical deficits and behaviors that affect risk of falls    -  [place] fall precautions as indicated by assessment   - Educate patient/family on patient safety including physical limitations  - Instruct patient to call for assistance with activity based on assessment  - Modify environment to reduce risk of injury     Outcome: Progressing     Problem: PAIN - ADULT  Goal: Verbalizes/displays adequate comfort level or baseline comfort level  Description  Interventions:  - Encourage patient to monitor pain and request assistance  - Assess pain using appropriate pain scale  - Administer analgesics based on type and severity of pain and evaluate response  - Implement non-pharmacological measures as appropriate and evaluate response  - Consider cultural and social influences on pain and pain management  - Notify physician/advanced practitioner if interventions unsuccessful or patient reports new pain  Outcome: Progressing     Problem: INFECTION - ADULT  Goal: Absence or prevention of progression during hospitalization  Description  INTERVENTIONS:  - Assess and monitor for signs and symptoms of infection  - Monitor lab/diagnostic results  - Monitor all insertion sites, i e  indwelling lines, tubes, and drains      - Administer medications as ordered  - Instruct and encourage patient and family to use good hand hygiene technique  - Identify and instruct in appropriate isolation precautions for identified infection/condition   Outcome: Progressing  Goal: Absence of fever/infection during neutropenic period  Description  INTERVENTIONS:  - Monitor WBC    Outcome: Progressing     Problem: SAFETY ADULT  Goal: Patient will remain free of falls  Description  INTERVENTIONS:  - Assess patient frequently for physical needs  -  Identify cognitive and physical deficits and behaviors that affect risk of falls    -  [place] fall precautions as indicated by assessment   - Educate patient/family on patient safety including physical limitations  - Instruct patient to call for assistance with activity based on assessment  - Modify environment to reduce risk of injury     Outcome: Progressing  Goal: Maintain or return to baseline ADL function  Description  INTERVENTIONS:  -  Assess patient's ability to carry out ADLs; assess patient's baseline for ADL function and identify physical deficits which impact ability to perform ADLs (bathing, care of mouth/teeth, toileting, grooming, dressing, etc )  - Assess/evaluate cause of self-care deficits   - Assess range of motion  - Assess patient's mobility; develop plan if impaired  - Assess patient's need for assistive devices and provide as appropriate  - Encourage maximum independence but intervene and supervise when necessary  - Involve family in performance of ADLs  - Assess for home care needs following discharge     - Provide patient education as appropriate   Outcome: Progressing  Goal: Maintain or return mobility status to optimal level  Description  INTERVENTIONS:  - Assess patient's baseline mobility status (ambulation, transfers, stairs, etc )    - Identify cognitive and physical deficits and behaviors that affect mobility  - Identify mobility aids required to assist with transfers and/or ambulation (gait belt, sit-to-stand, lift, walker, cane, etc )  - [place] fall precautions as indicated by assessment  - Record patient progress and toleration of activity level on Mobility SBAR; progress patient to next Phase/Stage  - Instruct patient to call for assistance with activity based on assessment  - Consider rehabilitation consult to assist with strengthening/weightbearing, etc   Outcome: Progressing     Problem: DISCHARGE PLANNING  Goal: Discharge to home or other facility with appropriate resources  Description  INTERVENTIONS:  - Identify barriers to discharge w/patient and caregiver  - Arrange for needed discharge resources and transportation as appropriate  - Identify discharge learning needs (meds, wound care, etc )  - Arrange for interpretive services to assist at discharge as needed  - Refer to Case Management Department for coordinating discharge planning if the patient needs post-hospital services based on physician/advanced practitioner order or complex needs related to functional status, cognitive ability, or social support system  Outcome: Progressing     Problem: Knowledge Deficit  Goal: Patient/family/caregiver demonstrates understanding of disease process, treatment plan, medications, and discharge instructions  Description  Complete learning assessment and assess knowledge base    Interventions:  - Provide teaching at level of understanding  - Provide teaching via preferred learning methods  Outcome: Progressing

## 2020-04-01 NOTE — UTILIZATION REVIEW
Initial Clinical Review    Admission: Date/Time/Statement: Admission Orders (From admission, onward)     Ordered        03/31/20 0748  Inpatient Admission  Once                   Orders Placed This Encounter   Procedures    Inpatient Admission     Standing Status:   Standing     Number of Occurrences:   1     Order Specific Question:   Admitting Physician     Answer:   Claudia Duron     Order Specific Question:   Level of Care     Answer:   Med Surg [16]     Order Specific Question:   Estimated length of stay     Answer:   More than 2 Midnights     Order Specific Question:   Certification     Answer:   I certify that inpatient services are medically necessary for this patient for a duration of greater than two midnights  See H&P and MD Progress Notes for additional information about the patient's course of treatment  ED Arrival Information     Expected Arrival Acuity Means of Arrival Escorted By Service Admission Type    - 3/31/2020 04:01 Urgent Walk-In Self General Medicine Urgent    Arrival Complaint    Chills;dizziness        Chief Complaint   Patient presents with    Dizziness     pt states dizziness and feeling hot and cold since yesterday  States she took tylenol last night around 2000  States she went down the stairs to get something out of the dryer and developed back pain  Assessment/Plan: 60 y o  female to ED from home with a PMH of sickle cell trait, GERD who presents with dizziness for the past 3 days  Patient had some tingling on the right side of face earlier this morning  Patient continues to have dizziness and feeling of hot and cold  Due to persistent symptoms, patient present to the ED  Patient denies any fever, cough, shortness of breath  Patient also reports some headache    In the ED patient had CT of the head and neck which showed focal area of low density lesion in the hieu which is nonspecific  Later while on the floor patient spiked a fever of 102 6 with tachycardia  In ED cranial nerves 2-12 grossly intact sensation normal to light touch all extremities  Motor strength is 5/5  Patient admitted inpatient Dizziness , Neurology seen in ED, MR ordered MRI negative for acute process echomild concentric hypertrophy with normal ejection fraction, grade 1 diastolic dysfunction with normal bubble study without any evidence of right-to-left shunt  Check hemoglobin A1c and fasting lipid panelChest tightness Chest x-ray was negative for any pneumoniaEKG showed nonspecific ST-T changes in the lateral leads  Initial troponin was negative  Monitor serial [id number] echo was performed which showed normal ejection fraction without any wall motion abnormalities   SIRS  patient spiked a fever of 102 and was tachycardic  Chest x-ray showed no evidence of pneumonia  Flu swab and RSV were ordered which were negative  Check urinalysis to rule out UTI  Blood cultures x2 were ordered  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   NEURO CONSULT  1  Lightheadedness   2  SIRS  Patient is experiencing symptoms only upon standing up  It could be from underlying infection she's developing  Her orthostatics are negative  There is no associated vertigo or any signs of stroke  Her MRI brain is normal    Recommend hydration and looking for etiology of underlying illness     ED Triage Vitals   Temperature Pulse Respirations Blood Pressure SpO2   03/31/20 0423 03/31/20 0426 03/31/20 0426 03/31/20 0426 03/31/20 0426   99 °F (37 2 °C) 88 20 123/58 98 %      Temp Source Heart Rate Source Patient Position - Orthostatic VS BP Location FiO2 (%)   03/31/20 0423 03/31/20 0735 03/31/20 0735 03/31/20 0426 --   Oral Monitor Lying Right arm       Pain Score       03/31/20 0852       No Pain        Wt Readings from Last 1 Encounters:   03/31/20 79 7 kg (175 lb 11 2 oz)     Additional Vital Signs:   03/31/20 1400  102 5 °F (39 2 °C)Abnormal   94  18  122/58 --  95 %  None (Room air)  Lying   03/31/20 1255  96 1 °F (35 6 °C)Abnormal   106Abnormal   20  113/75  --  --  --  Lying   03/31/20 0852  98 9 °F (37 2 °C)  89  20  105/56  --  95 %  None (Room air)  Sitting   03/31/20 0815  --  90  --  124/68  89  95 %  --  --   03/31/20 0735  --  89  18  129/60  --  99 %           Pertinent Labs/Diagnostic Test Results:   MRI No significant intracranial pathology identified  CTA HEAD NECK Focal area of low-density in the hieu (sagittal image 61, series 602), nonspecific but consider acute versus chronic ischemia depending on the clinical setting  MR of the brain may be of benefit for further evaluation at the discretion of the referring   caregiver  No acute intracranial process is seen otherwise  No occlusion, severe stenosis or aneurysm of the major arteries of the head and neck  No stenosis within either internal carotid artery  Patent bilateral vertebral arteries  Degenerative changes of the cervical spine  CXR No acute cardiopulmonary disease    Results from last 7 days   Lab Units 03/31/20  0451   WBC Thousand/uL 6 20   HEMOGLOBIN g/dL 12 6   HEMATOCRIT % 37 7   PLATELETS Thousands/uL 183   NEUTROS ABS Thousands/µL 4 36     Results from last 7 days   Lab Units 04/01/20  0537 03/31/20  0451   SODIUM mmol/L 133* 137   POTASSIUM mmol/L 3 8 3 4*   CHLORIDE mmol/L 99* 101   CO2 mmol/L 25 28   ANION GAP mmol/L 9 8   BUN mg/dL 10 9   CREATININE mg/dL 1 12 1 10   EGFR ml/min/1 73sq m 61 62   CALCIUM mg/dL 8 8 9 2     Results from last 7 days   Lab Units 03/31/20  0451   AST U/L 36   ALT U/L 53   ALK PHOS U/L 102   TOTAL PROTEIN g/dL 8 2   ALBUMIN g/dL 3 7   TOTAL BILIRUBIN mg/dL 0 30     Results from last 7 days   Lab Units 04/01/20  0537 03/31/20  0451   GLUCOSE RANDOM mg/dL 112 117     Results from last 7 days   Lab Units 04/01/20  0537   HEMOGLOBIN A1C % 5 8*   EAG mg/dl 120     Results from last 7 days   Lab Units 04/01/20  0537   TROPONIN I ng/mL <0 02     Results from last 7 days   Lab Units 03/31/20  0451   PROTIME seconds 12 6   INR  0 92   PTT seconds 27     Results from last 7 days   Lab Units 04/01/20  1118   CLARITY UA  Clear   COLOR UA  Yellow   SPEC GRAV UA  1 010   PH UA  6 0   GLUCOSE UA mg/dl Negative   KETONES UA mg/dl Trace*   BLOOD UA  Negative   PROTEIN UA mg/dl Negative   NITRITE UA  Negative   BILIRUBIN UA  Negative   UROBILINOGEN UA E U /dl 0 2   LEUKOCYTES UA  Negative     Results from last 7 days   Lab Units 03/31/20  1506   INFLUENZA A PCR  None Detected   INFLUENZA B PCR  None Detected   RSV PCR  None Detected     Results from last 7 days   Lab Units 03/31/20  1709   BLOOD CULTURE  Received in Microbiology Lab  Culture in Progress  Received in Microbiology Lab  Culture in Progress       ED Treatment:   Medication Administration from 03/31/2020 0401 to 03/31/2020 9572       Date/Time Order Dose Route Action Action by Comments     03/31/2020 0553 meclizine (ANTIVERT) tablet 25 mg 25 mg Oral Given Karli Reid RN      03/31/2020 0559 iohexol (OMNIPAQUE) 350 MG/ML injection (SINGLE-DOSE) 85 mL 85 mL Intravenous Given Oscar Jimenez      03/31/2020 9747 aspirin chewable tablet 324 mg 324 mg Oral Given Kathrine Cali RN      03/31/2020 0747 ondansetron (ZOFRAN) injection 4 mg 4 mg Intravenous Given Kathrine Cali RN         Past Medical History:   Diagnosis Date    H/O antinuclear antibodies     Urinary tract infection     last assessed: 10/24/2013     Present on Admission:   Esophageal reflux      Admitting Diagnosis: Dizziness [R42]  [facility]ce[facility]) [facility] [I63 9]  Age/Sex: 60 y o  female  Admission Orders:  CONTACT AND AIRBORNE ISOLATION  COVID 19 R/O  TELE MON  NEURO CHECKS  Orthostatic bp  2019 novel coronavirus   Troponin  mrsa   Scheduled Medications:    Medications:  atorvastatin 40 mg Oral QPM   enoxaparin 40 mg Subcutaneous Daily   melatonin 6 mg Oral HS   sodium chloride 2,000 mL Intravenous Once     Continuous IV Infusions: PRN Meds:    acetaminophen 650 mg Oral Q6H PRN   meclizine 25 mg Oral Q8H PRN       IP CONSULT TO NEUROLOGY  IP CONSULT TO CASE MANAGEMENT  IP CONSULT TO NUTRITION SERVICES    Network Utilization Review Department  [email or web address]  ATTENTION: Please call with any questions or concerns to [phone] and carefully listen to the prompts so that you are directed to the right person  All voicemails are confidential   Carolina Muss all requests for admission clinical reviews, approved or denied determinations and any other requests to dedicated fax number below belonging to the campus where the patient is receiving treatment   List of dedicated fax numbers for the Facilities:  [address] DENIALS (Administrative/Medical Necessity) [phone]   [address] (Maternity/NICU/Pediatrics) [phone] 5400 [facility] [phone]   [facility] [phone]   Joseline Villegas [phone]   Delmy Albarran [phone]   [address] [phone]   [facility]  [phone]   [address]5 [facility]  [address] [facility] [address] [facility] [phone]

## 2020-04-02 ENCOUNTER — TRANSITIONAL CARE MANAGEMENT (OUTPATIENT)
Dept: FAMILY MEDICINE CLINIC | Facility: CLINIC | Age: 65
End: 2020-04-02

## 2020-04-02 ENCOUNTER — TELEMEDICINE (OUTPATIENT)
Dept: FAMILY MEDICINE CLINIC | Facility: CLINIC | Age: 65
End: 2020-04-02
Payer: OTHER GOVERNMENT

## 2020-04-02 DIAGNOSIS — R07.89 CHEST TIGHTNESS: ICD-10-CM

## 2020-04-02 DIAGNOSIS — R42 DIZZINESS: ICD-10-CM

## 2020-04-02 DIAGNOSIS — U07.1 COVID-19 VIRUS INFECTION: Primary | ICD-10-CM

## 2020-04-02 LAB
MRSA NOSE QL CULT: NORMAL
SARS-COV-2 RNA SPEC QL NAA+PROBE: DETECTED

## 2020-04-02 PROCEDURE — 99495 TRANSJ CARE MGMT MOD F2F 14D: CPT | Performed by: NURSE PRACTITIONER

## 2020-04-05 LAB
BACTERIA BLD CULT: NORMAL
BACTERIA BLD CULT: NORMAL

## 2020-04-07 ENCOUNTER — TELEPHONE (OUTPATIENT)
Dept: FAMILY MEDICINE CLINIC | Facility: CLINIC | Age: 65
End: 2020-04-07

## 2020-04-07 ENCOUNTER — APPOINTMENT (EMERGENCY)
Dept: RADIOLOGY | Facility: HOSPITAL | Age: 65
DRG: 177 | End: 2020-04-07
Payer: OTHER GOVERNMENT

## 2020-04-07 ENCOUNTER — HOSPITAL ENCOUNTER (INPATIENT)
Facility: HOSPITAL | Age: 65
LOS: 3 days | Discharge: HOME | DRG: 177 | End: 2020-04-10
Attending: EMERGENCY MEDICINE | Admitting: FAMILY MEDICINE
Payer: OTHER GOVERNMENT

## 2020-04-07 ENCOUNTER — TELEMEDICINE (OUTPATIENT)
Dept: FAMILY MEDICINE CLINIC | Facility: CLINIC | Age: 65
End: 2020-04-07
Payer: OTHER GOVERNMENT

## 2020-04-07 DIAGNOSIS — U07.1 COVID-19 VIRUS INFECTION: ICD-10-CM

## 2020-04-07 DIAGNOSIS — R44.3 HALLUCINATIONS: Primary | ICD-10-CM

## 2020-04-07 DIAGNOSIS — R42 DIZZINESS: ICD-10-CM

## 2020-04-07 DIAGNOSIS — U07.1 COVID-19: ICD-10-CM

## 2020-04-07 DIAGNOSIS — U07.1 COVID-19 VIRUS INFECTION: Primary | ICD-10-CM

## 2020-04-07 PROBLEM — E87.6 HYPOKALEMIA: Status: ACTIVE | Noted: 2020-04-07

## 2020-04-07 PROBLEM — R65.10 SIRS (SYSTEMIC INFLAMMATORY RESPONSE SYNDROME) (HCC): Status: RESOLVED | Noted: 2020-03-31 | Resolved: 2020-04-07

## 2020-04-07 PROBLEM — E87.1 HYPONATREMIA: Status: ACTIVE | Noted: 2020-04-07

## 2020-04-07 LAB
ALBUMIN SERPL BCP-MCNC: 3 G/DL (ref 3.5–5)
ALP SERPL-CCNC: 118 U/L (ref 46–116)
ALT SERPL W P-5'-P-CCNC: 81 U/L (ref 12–78)
ANION GAP SERPL CALCULATED.3IONS-SCNC: 13 MMOL/L (ref 4–13)
AST SERPL W P-5'-P-CCNC: 94 U/L (ref 5–45)
BACTERIA UR QL AUTO: ABNORMAL /HPF
BASOPHILS # BLD AUTO: 0.01 THOUSANDS/ΜL (ref 0–0.1)
BASOPHILS NFR BLD AUTO: 0 % (ref 0–1)
BILIRUB SERPL-MCNC: 0.7 MG/DL (ref 0.2–1)
BILIRUB UR QL STRIP: NEGATIVE
BUN SERPL-MCNC: 13 MG/DL (ref 5–25)
CALCIUM SERPL-MCNC: 9 MG/DL (ref 8.3–10.1)
CHLORIDE SERPL-SCNC: 89 MMOL/L (ref 100–108)
CLARITY UR: CLEAR
CO2 SERPL-SCNC: 26 MMOL/L (ref 21–32)
COLOR UR: YELLOW
CREAT SERPL-MCNC: 1.22 MG/DL (ref 0.6–1.3)
CRP SERPL QL: 217.7 MG/L
D DIMER PPP FEU-MCNC: 1.97 UG/ML FEU
EOSINOPHIL # BLD AUTO: 0.01 THOUSAND/ΜL (ref 0–0.61)
EOSINOPHIL NFR BLD AUTO: 0 % (ref 0–6)
ERYTHROCYTE [DISTWIDTH] IN BLOOD BY AUTOMATED COUNT: 12.6 % (ref 11.6–15.1)
ERYTHROCYTE [SEDIMENTATION RATE] IN BLOOD: 96 MM/HOUR (ref 2–25)
FIBRINOGEN PPP-MCNC: 844 MG/DL (ref 227–495)
GFR SERPL CREATININE-BSD FRML MDRD: 55 ML/MIN/1.73SQ M
GLUCOSE SERPL-MCNC: 101 MG/DL (ref 65–140)
GLUCOSE UR STRIP-MCNC: NEGATIVE MG/DL
HCT VFR BLD AUTO: 34.9 % (ref 34.8–46.1)
HGB BLD-MCNC: 12 G/DL (ref 11.5–15.4)
HGB UR QL STRIP.AUTO: ABNORMAL
IMM GRANULOCYTES # BLD AUTO: 0.05 THOUSAND/UL (ref 0–0.2)
IMM GRANULOCYTES NFR BLD AUTO: 1 % (ref 0–2)
KETONES UR STRIP-MCNC: ABNORMAL MG/DL
LDH SERPL-CCNC: 531 U/L (ref 81–234)
LEUKOCYTE ESTERASE UR QL STRIP: ABNORMAL
LYMPHOCYTES # BLD AUTO: 1.31 THOUSANDS/ΜL (ref 0.6–4.47)
LYMPHOCYTES NFR BLD AUTO: 15 % (ref 14–44)
MAGNESIUM SERPL-MCNC: 2.1 MG/DL (ref 1.6–2.6)
MCH RBC QN AUTO: 26.9 PG (ref 26.8–34.3)
MCHC RBC AUTO-ENTMCNC: 34.4 G/DL (ref 31.4–37.4)
MCV RBC AUTO: 78 FL (ref 82–98)
MONOCYTES # BLD AUTO: 0.55 THOUSAND/ΜL (ref 0.17–1.22)
MONOCYTES NFR BLD AUTO: 6 % (ref 4–12)
NEUTROPHILS # BLD AUTO: 7.12 THOUSANDS/ΜL (ref 1.85–7.62)
NEUTS SEG NFR BLD AUTO: 78 % (ref 43–75)
NITRITE UR QL STRIP: NEGATIVE
NON-SQ EPI CELLS URNS QL MICRO: ABNORMAL /HPF
NRBC BLD AUTO-RTO: 0 /100 WBCS
PH UR STRIP.AUTO: 6.5 [PH]
PLATELET # BLD AUTO: 270 THOUSANDS/UL (ref 149–390)
PLATELET # BLD AUTO: 316 THOUSANDS/UL (ref 149–390)
PMV BLD AUTO: 10.4 FL (ref 8.9–12.7)
PMV BLD AUTO: 10.7 FL (ref 8.9–12.7)
POTASSIUM SERPL-SCNC: 3.4 MMOL/L (ref 3.5–5.3)
PROT SERPL-MCNC: 8.8 G/DL (ref 6.4–8.2)
PROT UR STRIP-MCNC: NEGATIVE MG/DL
RBC # BLD AUTO: 4.46 MILLION/UL (ref 3.81–5.12)
RBC #/AREA URNS AUTO: ABNORMAL /HPF
SODIUM SERPL-SCNC: 128 MMOL/L (ref 136–145)
SP GR UR STRIP.AUTO: <=1.005 (ref 1–1.03)
TROPONIN I SERPL-MCNC: <0.02 NG/ML
TSH SERPL DL<=0.05 MIU/L-ACNC: 0.8 UIU/ML (ref 0.36–3.74)
UROBILINOGEN UR QL STRIP.AUTO: 0.2 E.U./DL
WBC # BLD AUTO: 9.05 THOUSAND/UL (ref 4.31–10.16)
WBC #/AREA URNS AUTO: ABNORMAL /HPF

## 2020-04-07 PROCEDURE — 85652 RBC SED RATE AUTOMATED: CPT | Performed by: FAMILY MEDICINE

## 2020-04-07 PROCEDURE — 71045 X-RAY EXAM CHEST 1 VIEW: CPT

## 2020-04-07 PROCEDURE — 83615 LACTATE (LD) (LDH) ENZYME: CPT | Performed by: FAMILY MEDICINE

## 2020-04-07 PROCEDURE — 84443 ASSAY THYROID STIM HORMONE: CPT | Performed by: EMERGENCY MEDICINE

## 2020-04-07 PROCEDURE — 86140 C-REACTIVE PROTEIN: CPT | Performed by: FAMILY MEDICINE

## 2020-04-07 PROCEDURE — 81001 URINALYSIS AUTO W/SCOPE: CPT | Performed by: EMERGENCY MEDICINE

## 2020-04-07 PROCEDURE — 80053 COMPREHEN METABOLIC PANEL: CPT | Performed by: EMERGENCY MEDICINE

## 2020-04-07 PROCEDURE — 85025 COMPLETE CBC W/AUTO DIFF WBC: CPT | Performed by: EMERGENCY MEDICINE

## 2020-04-07 PROCEDURE — 87086 URINE CULTURE/COLONY COUNT: CPT | Performed by: EMERGENCY MEDICINE

## 2020-04-07 PROCEDURE — 85049 AUTOMATED PLATELET COUNT: CPT | Performed by: FAMILY MEDICINE

## 2020-04-07 PROCEDURE — 99285 EMERGENCY DEPT VISIT HI MDM: CPT

## 2020-04-07 PROCEDURE — 99285 EMERGENCY DEPT VISIT HI MDM: CPT | Performed by: EMERGENCY MEDICINE

## 2020-04-07 PROCEDURE — 99223 1ST HOSP IP/OBS HIGH 75: CPT | Performed by: FAMILY MEDICINE

## 2020-04-07 PROCEDURE — 85384 FIBRINOGEN ACTIVITY: CPT | Performed by: FAMILY MEDICINE

## 2020-04-07 PROCEDURE — 93005 ELECTROCARDIOGRAM TRACING: CPT

## 2020-04-07 PROCEDURE — 84484 ASSAY OF TROPONIN QUANT: CPT | Performed by: EMERGENCY MEDICINE

## 2020-04-07 PROCEDURE — G2012 BRIEF CHECK IN BY MD/QHP: HCPCS | Performed by: FAMILY MEDICINE

## 2020-04-07 PROCEDURE — 70450 CT HEAD/BRAIN W/O DYE: CPT

## 2020-04-07 PROCEDURE — 87147 CULTURE TYPE IMMUNOLOGIC: CPT | Performed by: EMERGENCY MEDICINE

## 2020-04-07 PROCEDURE — 36415 COLL VENOUS BLD VENIPUNCTURE: CPT | Performed by: EMERGENCY MEDICINE

## 2020-04-07 PROCEDURE — 83735 ASSAY OF MAGNESIUM: CPT | Performed by: EMERGENCY MEDICINE

## 2020-04-07 PROCEDURE — 85379 FIBRIN DEGRADATION QUANT: CPT | Performed by: FAMILY MEDICINE

## 2020-04-07 RX ORDER — CEFTRIAXONE 1 G/50ML
1000 INJECTION, SOLUTION INTRAVENOUS EVERY 24 HOURS
Status: DISCONTINUED | OUTPATIENT
Start: 2020-04-07 | End: 2020-04-08

## 2020-04-07 RX ORDER — AZITHROMYCIN 250 MG/1
250 TABLET, FILM COATED ORAL EVERY 24 HOURS
Status: DISCONTINUED | OUTPATIENT
Start: 2020-04-08 | End: 2020-04-10 | Stop reason: HOSPADM

## 2020-04-07 RX ORDER — HYDROXYCHLOROQUINE SULFATE 200 MG/1
200 TABLET, FILM COATED ORAL 2 TIMES DAILY
Status: DISCONTINUED | OUTPATIENT
Start: 2020-04-08 | End: 2020-04-10 | Stop reason: HOSPADM

## 2020-04-07 RX ORDER — HYDROXYCHLOROQUINE SULFATE 200 MG/1
400 TABLET, FILM COATED ORAL 2 TIMES DAILY
Status: COMPLETED | OUTPATIENT
Start: 2020-04-07 | End: 2020-04-08

## 2020-04-07 RX ORDER — ASCORBIC ACID 500 MG
1000 TABLET ORAL 2 TIMES DAILY
Status: DISCONTINUED | OUTPATIENT
Start: 2020-04-07 | End: 2020-04-10 | Stop reason: HOSPADM

## 2020-04-07 RX ORDER — HEPARIN SODIUM 5000 [USP'U]/ML
5000 INJECTION, SOLUTION INTRAVENOUS; SUBCUTANEOUS EVERY 8 HOURS SCHEDULED
Status: DISCONTINUED | OUTPATIENT
Start: 2020-04-07 | End: 2020-04-10 | Stop reason: HOSPADM

## 2020-04-07 RX ORDER — AZITHROMYCIN 500 MG/1
500 TABLET, FILM COATED ORAL ONCE
Status: COMPLETED | OUTPATIENT
Start: 2020-04-07 | End: 2020-04-07

## 2020-04-07 RX ORDER — ZINC SULFATE 50(220)MG
220 CAPSULE ORAL DAILY
Status: DISCONTINUED | OUTPATIENT
Start: 2020-04-07 | End: 2020-04-10 | Stop reason: HOSPADM

## 2020-04-07 RX ORDER — LANOLIN ALCOHOL/MO/W.PET/CERES
3 CREAM (GRAM) TOPICAL
Status: DISCONTINUED | OUTPATIENT
Start: 2020-04-07 | End: 2020-04-09

## 2020-04-07 RX ORDER — ATORVASTATIN CALCIUM 40 MG/1
40 TABLET, FILM COATED ORAL
Status: DISCONTINUED | OUTPATIENT
Start: 2020-04-07 | End: 2020-04-10 | Stop reason: HOSPADM

## 2020-04-07 RX ORDER — ACETAMINOPHEN 325 MG/1
650 TABLET ORAL EVERY 6 HOURS PRN
Status: DISCONTINUED | OUTPATIENT
Start: 2020-04-07 | End: 2020-04-10 | Stop reason: HOSPADM

## 2020-04-07 RX ORDER — ONDANSETRON 2 MG/ML
4 INJECTION INTRAMUSCULAR; INTRAVENOUS EVERY 6 HOURS PRN
Status: DISCONTINUED | OUTPATIENT
Start: 2020-04-07 | End: 2020-04-10 | Stop reason: HOSPADM

## 2020-04-07 RX ADMIN — ACETAMINOPHEN 650 MG: 325 TABLET ORAL at 21:29

## 2020-04-07 RX ADMIN — ZINC SULFATE 220 MG (50 MG) CAPSULE 220 MG: CAPSULE at 18:26

## 2020-04-07 RX ADMIN — MELATONIN 3 MG: at 21:29

## 2020-04-07 RX ADMIN — OXYCODONE HYDROCHLORIDE AND ACETAMINOPHEN 1000 MG: 500 TABLET ORAL at 18:27

## 2020-04-07 RX ADMIN — HEPARIN SODIUM 5000 UNITS: 5000 INJECTION, SOLUTION INTRAVENOUS; SUBCUTANEOUS at 21:29

## 2020-04-07 RX ADMIN — AZITHROMYCIN 500 MG: 500 TABLET, FILM COATED ORAL at 18:26

## 2020-04-07 RX ADMIN — HYDROXYCHLOROQUINE SULFATE 400 MG: 200 TABLET, FILM COATED ORAL at 18:27

## 2020-04-07 RX ADMIN — CEFTRIAXONE 1000 MG: 1 INJECTION, SOLUTION INTRAVENOUS at 18:46

## 2020-04-07 RX ADMIN — ATORVASTATIN CALCIUM 40 MG: 40 TABLET, FILM COATED ORAL at 18:28

## 2020-04-07 NOTE — ED PROVIDER NOTES
History  Chief Complaint   Patient presents with    Hallucinations     Pt states that she has been seeing shadows on the walls since this AM     61 y/o female presents with visual hallucinations where she is seeing shadows on the wall since this morning  Patient denies any head injury, recently diagnosed with covid  Denies shortness of breath admits to cough  No focal weakness, numbness, tingling or any other symptoms  No slurred speech, dysarthria, no expressive aphasia  Admits to generalized weakness  History provided by:  Patient   used: No        Prior to Admission Medications   Prescriptions Last Dose Informant Patient Reported? Taking?   hydrOXYzine HCL (ATARAX) 25 mg tablet More than a month at Unknown time  No No   Sig: Take 1 tablet (25 mg total) by mouth 2 (two) times a day   Patient taking differently: Take 25 mg by mouth as needed       Facility-Administered Medications: None       Past Medical History:   Diagnosis Date    H/O antinuclear antibodies     SIRS (systemic inflammatory response syndrome) ([facility] ) 3/31/2020    Urinary tract infection     last assessed: 10/24/2013       Past Surgical History:   Procedure Laterality Date    APPENDECTOMY         Family History   Problem Relation Age of Onset    Diabetes Mother      I have reviewed and agree with the history as documented  E-Cigarette/Vaping    E-Cigarette Use Never User      E-Cigarette/Vaping Substances     Social History     Tobacco Use    Smoking status: Never Smoker    Smokeless tobacco: Never Used   Substance Use Topics    Alcohol use: Not Currently     Frequency: Never     Binge frequency: Never    Drug use: Not Currently       Review of Systems   Constitutional: Negative  HENT: Negative  Eyes: Negative  Respiratory: Negative  Cardiovascular: Negative  Gastrointestinal: Negative  Endocrine: Negative  Genitourinary: Negative  Musculoskeletal: Negative  Skin: Negative  Allergic/Immunologic: Negative  Neurological: Positive for weakness and light-headedness  Hematological: Negative  Psychiatric/Behavioral: Positive for hallucinations  All other systems reviewed and are negative  Physical Exam  Physical Exam   Constitutional: She is oriented to person, place, and time  She appears well-developed and well-nourished  HENT:   Head: Normocephalic and atraumatic  Eyes: Pupils are equal, round, and reactive to light  EOM are normal    Neck: Normal range of motion  Neck supple  Cardiovascular: Normal rate and regular rhythm  Pulmonary/Chest: Effort normal and breath sounds normal    Abdominal: Soft  Bowel sounds are normal    Musculoskeletal: Normal range of motion  Neurological: She is alert and oriented to person, place, and time  She displays normal reflexes  No cranial nerve deficit or sensory deficit  She exhibits normal muscle tone  Coordination normal    Skin: Skin is warm and dry  Psychiatric: She has a normal mood and affect  Nursing note and vitals reviewed        Vital Signs  ED Triage Vitals   Temperature Pulse Respirations Blood Pressure SpO2   04/07/20 1228 04/07/20 1228 04/07/20 1228 04/07/20 1228 04/07/20 1228   98 4 °F (36 9 °C) 86 22 117/55 90 %      Temp Source Heart Rate Source Patient Position - Orthostatic VS BP Location FiO2 (%)   04/07/20 1228 04/07/20 1430 04/07/20 1228 04/07/20 1228 --   Oral Monitor Lying Left arm       Pain Score       04/07/20 1500       No Pain           Vitals:    04/08/20 2346 04/09/20 0358 04/09/20 0730 04/09/20 1300   BP: 107/58 111/56 106/54 100/50   Pulse: 73 69 67 78   Patient Position - Orthostatic VS: Lying Lying Lying Lying         Visual Acuity      ED Medications  Medications   hydroxychloroquine (PLAQUENIL) tablet 200 mg (200 mg Oral Given 4/9/20 0941)   azithromycin (ZITHROMAX) tablet 250 mg (250 mg Oral Given 4/8/20 1332)   zinc sulfate (ZINCATE) capsule 220 mg (220 mg Oral Given 4/9/20 0941) ondansetron [facility]) injection 4 mg (has no administration in time range)   heparin (porcine) subcutaneous injection 5,000 Units (5,000 Units Subcutaneous Given 4/9/20 0625)   ascorbic acid (VITAMIN C) tablet 1,000 mg (1,000 mg Oral Given 4/9/20 0941)   atorvastatin (LIPITOR) tablet 40 mg (40 mg Oral Given 4/8/20 1701)   acetaminophen (TYLENOL) tablet 650 mg (650 mg Oral Given 4/9/20 1252)   melatonin tablet 3 mg (3 mg Oral Given 4/8/20 2118)   hydroxychloroquine (PLAQUENIL) tablet 400 mg (400 mg Oral Given 4/8/20 0903)   azithromycin (ZITHROMAX) tablet 500 mg (500 mg Oral Given 4/7/20 1826)   potassium chloride (K-DUR,KLOR-CON) CR tablet 40 mEq (40 mEq Oral Given 4/8/20 1132)   melatonin tablet 3 mg (3 mg Oral Given 4/9/20 0057)       Diagnostic Studies  Results Reviewed     Procedure Component Value Units Date/Time    Urine culture [[id number]]  (Abnormal) Collected:  04/07/20 1933    Lab Status:  Final result Specimen:  Urine, Clean Catch Updated:  04/08/20 1710     Urine Culture 40,000-49,000 cfu/ml Beta Hemolytic Streptococcus Group B    Ferritin [[id number]]  (Abnormal) Collected:  04/08/20 0452    Lab Status:  Final result Specimen:  Blood from Arm, Left Updated:  04/08/20 0929     Ferritin 1,335 ng/mL     Fibrinogen [[id number]]  (Abnormal) Collected:  04/07/20 1927    Lab Status:  Final result Specimen:  Blood from Arm, Left Updated:  04/07/20 2329     Fibrinogen 844 mg/dL     D-dimer, quantitative [[id number]]  (Abnormal) Collected:  04/07/20 1927    Lab Status:  Final result Specimen:  Blood from Arm, Left Updated:  04/07/20 1958     D-Dimer, Quant 1 97 ug/ml FEU     UA (URINE) with reflex to Scope [[id number]]  (Abnormal) Collected:  04/07/20 1927    Lab Status:  Final result Specimen:  Urine, Clean Catch Updated:  04/07/20 1944     Color, UA Yellow     Clarity, UA Clear     Specific Gravity, UA <=1 005     pH, UA 6 5     Leukocytes, UA Small     Nitrite, UA Negative     Protein, UA Negative mg/dl Glucose, UA Negative mg/dl      Ketones, UA Trace mg/dl      Urobilinogen, UA 0 2 E U /dl      Bilirubin, UA Negative     Blood, UA Trace-Intact    Sedimentation rate, automated [[id number]]  (Abnormal) Collected:  04/07/20 1243    Lab Status:  Final result Specimen:  Blood from Arm, Right Updated:  04/07/20 1508     Sed Rate 96 mm/hour     C-reactive protein [[id number]]  (Abnormal) Collected:  04/07/20 1243    Lab Status:  Final result Specimen:  Blood from Arm, Right Updated:  04/07/20 1446      7 mg/L     Lactate dehydrogenase [[id number]]  (Abnormal) Collected:  04/07/20 1243    Lab Status:  Final result Specimen:  Blood from Arm, Right Updated:  04/07/20 1446      U/L     TSH [[id number]]  (Normal) Collected:  04/07/20 1243    Lab Status:  Final result Specimen:  Blood from Arm, Right Updated:  04/07/20 1322     TSH 3RD GENERATON 0 804 uIU/mL     Narrative:       Patients undergoing fluorescein dye angiography may retain small amounts of fluorescein in the body for 48-72 hours post procedure  Samples containing fluorescein can produce falsely depressed TSH values  If the patient had this procedure,a specimen should be resubmitted post fluorescein clearance        Comprehensive metabolic panel [[id number]]  (Abnormal) Collected:  04/07/20 1243    Lab Status:  Final result Specimen:  Blood from Arm, Right Updated:  04/07/20 1322     Sodium 128 mmol/L      Potassium 3 4 mmol/L      Chloride 89 mmol/L      CO2 26 mmol/L      ANION GAP 13 mmol/L      BUN 13 mg/dL      Creatinine 1 22 mg/dL      Glucose 101 mg/dL      Calcium 9 0 mg/dL      AST 94 U/L      ALT 81 U/L      Alkaline Phosphatase 118 U/L      Total Protein 8 8 g/dL      Albumin 3 0 g/dL      Total Bilirubin 0 70 mg/dL      eGFR 55 ml/min/1 73sq m     Narrative:       [place] guidelines for Chronic Kidney Disease (CKD):     Stage 1 with normal or high GFR (GFR > 90 mL/min/1 73 square meters)    Stage 2 Mild CKD (GFR = 60-89 mL/min/1 73 square meters)    Stage 3A Moderate CKD (GFR = 45-59 mL/min/1 73 square meters)    Stage 3B Moderate CKD (GFR = 30-44 mL/min/1 73 square meters)    Stage 4 Severe CKD (GFR = 15-29 mL/min/1 73 square meters)    Stage 5 End Stage CKD (GFR <15 mL/min/1 73 square meters)  Note: GFR calculation is accurate only with a steady state creatinine    Magnesium [[id number]]  (Normal) Collected:  04/07/20 1243    Lab Status:  Final result Specimen:  Blood from Arm, Right Updated:  04/07/20 1322     Magnesium 2 1 mg/dL     Troponin I [[id number]]  (Normal) Collected:  04/07/20 1243    Lab Status:  Final result Specimen:  Blood from Arm, Right Updated:  04/07/20 1308     Troponin I <0 02 ng/mL     CBC and differential [[id number]]  (Abnormal) Collected:  04/07/20 1243    Lab Status:  Final result Specimen:  Blood from Arm, Right Updated:  04/07/20 1248     WBC 9 05 Thousand/uL      RBC 4 46 Million/uL      Hemoglobin 12 0 g/dL      Hematocrit 34 9 %      MCV 78 fL      MCH 26 9 pg      MCHC 34 4 g/dL      RDW 12 6 %      MPV 10 7 fL      Platelets 515 Thousands/uL      nRBC 0 /100 WBCs      Neutrophils Relative 78 %      Immat GRANS % 1 %      Lymphocytes Relative 15 %      Monocytes Relative 6 %      Eosinophils Relative 0 %      Basophils Relative 0 %      Neutrophils Absolute 7 12 Thousands/µL      Immature Grans Absolute 0 05 Thousand/uL      Lymphocytes Absolute 1 31 Thousands/µL      Monocytes Absolute 0 55 Thousand/µL      Eosinophils Absolute 0 01 Thousand/µL      Basophils Absolute 0 01 Thousands/µL                  CT head without contrast   Final Result by Ta Garland MD (04/07 1343)      No acute intracranial hemorrhage   No mass effect or midline shift                  Workstation performed: [id number]         XR chest 1 view portable   Final Result by Randa Washburn MD (04/07 1335)      Subtle peripheral right greater than left ground glass opacity, compatible with viral pneumonia        The study was marked in [facility] for immediate notification              Workstation performed: [id number]                    Procedures  ECG 12 Lead Documentation Only  Date/Time: 4/7/2020 12:52 PM  Performed by: Tawnya Klinefelter, DO  Authorized by: Tawnya Klinefelter, DO     ECG reviewed by me, the ED Provider: yes    Patient location:  ED  Previous ECG:     Previous ECG:  Unavailable    Comparison to cardiac monitor: Yes    Interpretation:     Interpretation: non-specific    Rate:     ECG rate assessment: normal    Rhythm:     Rhythm: sinus rhythm    Ectopy:     Ectopy: none    QRS:     QRS axis:  Normal  Conduction:     Conduction: normal    ST segments:     ST segments:  Non-specific  T waves:     T waves: non-specific               ED Course                                       MDM  Number of Diagnoses or Management Options  COVID-19:   Hallucinations:      Amount and/or Complexity of Data Reviewed  Clinical lab tests: ordered and reviewed  Tests in the radiology section of CPT®: ordered and reviewed  Tests in the medicine section of CPT®: ordered and reviewed    Patient Progress  Patient progress: stable        Disposition  Final diagnoses:   Hallucinations   COVID-19     Time reflects when diagnosis was documented in both MDM as applicable and the Disposition within this note     Time User Action Codes Description Comment    4/7/2020  2:05 PM Donn Murraya Add [R44 3] Hallucinations     4/7/2020  2:05 PM Anabell Murray Add [U07 1,  J98 8] COVID-19     4/8/2020  9:54 AM Adriana Liriano Add [U07 1] COVID-19 virus infection       ED Disposition     ED Disposition Condition Date/Time Comment    Admit Stable Tue Apr 7, 2020  2:05 PM          Follow-up Information    None         Current Discharge Medication List      CONTINUE these medications which have NOT CHANGED    Details   hydrOXYzine HCL (ATARAX) 25 mg tablet Take 1 tablet (25 mg total) by mouth 2 (two) times a day  Qty: 60 tablet, Refills: 1    Associated Diagnoses: Itchy scalp           No discharge procedures on file      PDMP Review     None          ED Provider  Electronically Signed by           Hilda Massey DO  04/09/20 6297

## 2020-04-07 NOTE — ASSESSMENT & PLAN NOTE
Most likely secondary to decreased oral intake from pneumonia  Continue to encourage oral hydration  Monitor Na on BMP daily

## 2020-04-07 NOTE — H&P
H&P- Beckiedda Magda 1955, 60 y o  female MRN: [id number]    Unit/Bed#: [address] [address] Encounter: [id number]    Primary Care Provider: Armida Bettencourt DO   Date and time admitted to hospital: 4/7/2020 12:20 PM        * COVID-19 virus infection  Assessment & Plan  Patient presented with hallucinations after suffering from COVID-19 symptoms since 04/01/2020 when she presented to her PCP and was tested  She presented to the ED today since her test results came back positive  She is currently on contact/airborne/droplet isolation  Therapy with hydroxychloroquine 400 mg PO q 12 hours x 1 day and then 200 mg PO q 12 hours x4 days instituted  Also started on azithromycin 500 mg PO x1 and then azithromycin 250 mg PO daily x4 days  Zinc sulfate 220 mg daily instituted  Will also start vitamin-C  Baseline EKG ordered  Last EKG on 03/31/2020 showed QTc 442ms  Continue telemetry to monitor QTC interval daily  Will check procalcitonin, LDH, C-reactive protein, erythrocyte sedimentation rate, D-dimer, ferritin, fibrinogen  Trend inflammatory markers daily  Will also check cardiac markers troponin, CK, BNP  Hyponatremia  Assessment & Plan  Most likely secondary to decreased oral intake from pneumonia  Continue to encourage oral hydration  Monitor Na on BMP daily  Hypokalemia  Assessment & Plan  Will replace with oral potassium chloride 40 mEq PO x1  Monitor electrolytes-K and Mag daily  VTE Prophylaxis: Heparin  / reason for no mechanical VTE prophylaxis Patient refused   Code Status:  Full code  POLST: There is no POLST form on file for this patient (pre-hospital)  Discussion with family:  No    Anticipated Length of Stay:  Patient will be admitted on an Inpatient basis with an anticipated length of stay of  greater than 2 midnights  Justification for Hospital Stay: COVID-19 positive    Total Time for Visit, including Counseling / Coordination of Care: 15 minutes    Greater than 50% of this total time spent on direct patient counseling and coordination of care  Chief Complaint:   Hallucinations with fatigue    History of Present Illness:    Alberta Shelby is a 60 y o  female who presents with symptoms of fatigue and hallucinations which she had been suffering since 04/01/2020 when she presented to her PCP and she was tested for COVID-19  Her test results returned positive and she was instructed by her PCP to go to the ED for treatment  On presentation to the ED at [facility] she was noted to have a low-grade temperature of 99 6° F but was oxygenating at 98% on room air  Portable chest x-ray revealed subtle peripheral right greater than left ground-glass opacity, compatible with viral pneumonia  CT of the head without contrast was also negative  She was started on therapy with hydroxychloroquine , azithromycin, zinc sulfate, vitamin-C  She was also placed on contact/airborne/droplet isolation  She will be placed on telemetry and admitted to the medical-surgical unit  Patient denies any sick contacts  Review of Systems:    Review of Systems   Constitutional: Positive for chills and fatigue  Negative for appetite change and fever  HENT: Negative  Respiratory: Negative  Cardiovascular: Negative  Gastrointestinal: Negative  Genitourinary: Negative  Musculoskeletal: Positive for myalgias  Neurological: Negative  Psychiatric/Behavioral: Positive for hallucinations  Past Medical and Surgical History:     Past Medical History:   Diagnosis Date    H/O antinuclear antibodies     [facility] 3[facility]    Urinary tract infection     last assessed: 10/24/2013       Past Surgical History:   Procedure Laterality Date    APPENDECTOMY         Meds/Allergies:    Prior to Admission medications    Medication Sig Start Date End Date Taking?  Authorizing Provider   hydrOXYzine HCL (ATARAX) 25 mg tablet Take 1 tablet (25 mg total) by mouth 2 (two) times a day  Patient taking differently: Take 25 mg by mouth as needed  1/10/20   TEDDY Ly   meclizine (ANTIVERT) 25 mg tablet Take 1 tablet (25 mg total) by mouth every 8 (eight) hours as needed for dizziness 4/1/20 4/7/20  Nery Garcia MD     I have reviewed home medications using allscripts  Allergies: No Known Allergies    Social History:     Marital Status: Single   Occupation:  Unknown  Patient Pre-hospital Living Situation:  Lives at home  Patient Pre-hospital Level of Mobility:  Ambulatory  Patient Pre-hospital Diet Restrictions:  None  Substance Use History:   Social History     Substance and Sexual Activity   Alcohol Use Not Currently    Frequency: Never    Binge frequency: Never     Social History     Tobacco Use   Smoking Status Never Smoker   Smokeless Tobacco Never Used     Social History     Substance and Sexual Activity   Drug Use Not Currently       Family History:    Family History   Problem Relation Age of Onset    Diabetes Mother        Physical Exam:     Vitals:   Blood Pressure: 124/56 (04/07/20 1430)  Pulse: 88 (04/07/20 1430)  Temperature: 99 6 °F (37 6 °C) (04/07/20 1430)  Temp Source: Oral (04/07/20 1430)  Respirations: 21 (04/07/20 1430)  Height: 5' 8" (172 7 cm) (04/07/20 1430)  Weight - Scale: 79 4 kg (175 lb) (04/07/20 1430)  SpO2: 98 % (04/07/20 1251)    Physical Exam   Constitutional: She is oriented to person, place, and time  She appears well-developed and well-nourished  No distress  HENT:   Head: Normocephalic and atraumatic  Eyes: Pupils are equal, round, and reactive to light  EOM are normal    Neck: Normal range of motion  Neck supple  No JVD present  Cardiovascular: Normal rate, regular rhythm and normal heart sounds  No murmur heard  Pulmonary/Chest: Effort normal and breath sounds normal  She has no wheezes  She has no rales  Abdominal: Soft  Bowel sounds are normal  She exhibits no distension  There is no tenderness  Musculoskeletal: She exhibits no edema  Neurological: She is alert and oriented to person, place, and time  Skin: Skin is warm and dry  She is not diaphoretic  Additional Data:     Lab Results: I have personally reviewed pertinent reports  Results from last 7 days   Lab Units 04/07/20  1243   WBC Thousand/uL 9 05   HEMOGLOBIN g/dL 12 0   HEMATOCRIT % 34 9   PLATELETS Thousands/uL 270   NEUTROS PCT % 78*   LYMPHS PCT % 15   MONOS PCT % 6   EOS PCT % 0     Results from last 7 days   Lab Units 04/07/20  1243   SODIUM mmol/L 128*   POTASSIUM mmol/L 3 4*   CHLORIDE mmol/L 89*   CO2 mmol/L 26   BUN mg/dL 13   CREATININE mg/dL 1 22   ANION GAP mmol/L 13   CALCIUM mg/dL 9 0   ALBUMIN g/dL 3 0*   TOTAL BILIRUBIN mg/dL 0 70   ALK PHOS U/L 118*   ALT U/L 81*   AST U/L 94*   GLUCOSE RANDOM mg/dL 101             Results from last 7 days   Lab Units 04/01/20  0537   HEMOGLOBIN A1C % 5 8*           Imaging: I have personally reviewed pertinent reports  CT head without contrast   Final Result by Param Bryan MD (04/07 1343)      No acute intracranial hemorrhage   No mass effect or midline shift                  Workstation performed: [id number]         XR chest 1 view portable   Final Result by Eboni Day MD (04/07 1335)      Subtle peripheral right greater than left ground glass opacity, compatible with viral pneumonia  The study was marked in [facility] for immediate notification  Workstation performed: [id number]             EKG, Pathology, and Other Studies Reviewed on Admission:   · EKG:  Portable chest x-ray, CT head without contrast    Allscripts / Epic Records Reviewed: Yes     ** Please Note: This note has been constructed using a voice recognition system   **

## 2020-04-07 NOTE — ASSESSMENT & PLAN NOTE
Patient presented with hallucinations after suffering from COVID-19 symptoms since 04/01/2020 when she presented to her PCP and was tested  She presented to the ED today since her test results came back positive  She is currently on contact/airborne/droplet isolation  Therapy with hydroxychloroquine 400 mg PO q 12 hours x 1 day and then 200 mg PO q 12 hours x4 days instituted  Also started on azithromycin 500 mg PO x1 and then azithromycin 250 mg PO daily x4 days  Zinc sulfate 220 mg daily instituted  Will also start vitamin-C  Baseline EKG ordered  Last EKG on 03/31/2020 showed QTc 442ms  Continue telemetry to monitor QTC interval daily  Will check procalcitonin, LDH, C-reactive protein, erythrocyte sedimentation rate, D-dimer, ferritin, fibrinogen  Trend inflammatory markers daily  Will also check cardiac markers troponin, CK, BNP

## 2020-04-08 ENCOUNTER — TELEPHONE (OUTPATIENT)
Dept: FAMILY MEDICINE CLINIC | Facility: CLINIC | Age: 65
End: 2020-04-08

## 2020-04-08 PROBLEM — R44.3 HALLUCINATIONS: Status: ACTIVE | Noted: 2020-04-08

## 2020-04-08 LAB
ALBUMIN SERPL BCP-MCNC: 3 G/DL (ref 3.5–5)
ALP SERPL-CCNC: 106 U/L (ref 46–116)
ALT SERPL W P-5'-P-CCNC: 64 U/L (ref 12–78)
ANION GAP SERPL CALCULATED.3IONS-SCNC: 11 MMOL/L (ref 4–13)
AST SERPL W P-5'-P-CCNC: 58 U/L (ref 5–45)
ATRIAL RATE: 88 BPM
BACTERIA UR CULT: ABNORMAL
BASOPHILS # BLD AUTO: 0.01 THOUSANDS/ΜL (ref 0–0.1)
BASOPHILS NFR BLD AUTO: 0 % (ref 0–1)
BILIRUB SERPL-MCNC: 0.6 MG/DL (ref 0.2–1)
BUN SERPL-MCNC: 12 MG/DL (ref 5–25)
CALCIUM SERPL-MCNC: 9.6 MG/DL (ref 8.3–10.1)
CHLORIDE SERPL-SCNC: 93 MMOL/L (ref 100–108)
CO2 SERPL-SCNC: 27 MMOL/L (ref 21–32)
CREAT SERPL-MCNC: 1.13 MG/DL (ref 0.6–1.3)
CRP SERPL QL: 239.9 MG/L
EOSINOPHIL # BLD AUTO: 0.03 THOUSAND/ΜL (ref 0–0.61)
EOSINOPHIL NFR BLD AUTO: 0 % (ref 0–6)
ERYTHROCYTE [DISTWIDTH] IN BLOOD BY AUTOMATED COUNT: 12.7 % (ref 11.6–15.1)
FERRITIN SERPL-MCNC: 1335 NG/ML (ref 8–388)
GFR SERPL CREATININE-BSD FRML MDRD: 60 ML/MIN/1.73SQ M
GLUCOSE SERPL-MCNC: 108 MG/DL (ref 65–140)
HCT VFR BLD AUTO: 34.9 % (ref 34.8–46.1)
HGB BLD-MCNC: 11.7 G/DL (ref 11.5–15.4)
IMM GRANULOCYTES # BLD AUTO: 0.06 THOUSAND/UL (ref 0–0.2)
IMM GRANULOCYTES NFR BLD AUTO: 1 % (ref 0–2)
LYMPHOCYTES # BLD AUTO: 1.3 THOUSANDS/ΜL (ref 0.6–4.47)
LYMPHOCYTES NFR BLD AUTO: 14 % (ref 14–44)
MAGNESIUM SERPL-MCNC: 2.6 MG/DL (ref 1.6–2.6)
MCH RBC QN AUTO: 26.5 PG (ref 26.8–34.3)
MCHC RBC AUTO-ENTMCNC: 33.5 G/DL (ref 31.4–37.4)
MCV RBC AUTO: 79 FL (ref 82–98)
MONOCYTES # BLD AUTO: 0.65 THOUSAND/ΜL (ref 0.17–1.22)
MONOCYTES NFR BLD AUTO: 7 % (ref 4–12)
NEUTROPHILS # BLD AUTO: 7.03 THOUSANDS/ΜL (ref 1.85–7.62)
NEUTS SEG NFR BLD AUTO: 78 % (ref 43–75)
NRBC BLD AUTO-RTO: 0 /100 WBCS
P AXIS: 49 DEGREES
PHOSPHATE SERPL-MCNC: 3.9 MG/DL (ref 2.3–4.1)
PLATELET # BLD AUTO: 339 THOUSANDS/UL (ref 149–390)
PMV BLD AUTO: 11.1 FL (ref 8.9–12.7)
POTASSIUM SERPL-SCNC: 3.1 MMOL/L (ref 3.5–5.3)
PR INTERVAL: 142 MS
PROCALCITONIN SERPL-MCNC: 0.99 NG/ML
PROT SERPL-MCNC: 8.9 G/DL (ref 6.4–8.2)
QRS AXIS: 27 DEGREES
QRSD INTERVAL: 70 MS
QT INTERVAL: 362 MS
QTC INTERVAL: 438 MS
RBC # BLD AUTO: 4.41 MILLION/UL (ref 3.81–5.12)
SODIUM SERPL-SCNC: 131 MMOL/L (ref 136–145)
T WAVE AXIS: 35 DEGREES
VENTRICULAR RATE: 88 BPM
WBC # BLD AUTO: 9.08 THOUSAND/UL (ref 4.31–10.16)

## 2020-04-08 PROCEDURE — 82728 ASSAY OF FERRITIN: CPT | Performed by: FAMILY MEDICINE

## 2020-04-08 PROCEDURE — 86140 C-REACTIVE PROTEIN: CPT | Performed by: FAMILY MEDICINE

## 2020-04-08 PROCEDURE — 99255 IP/OBS CONSLTJ NEW/EST HI 80: CPT | Performed by: INTERNAL MEDICINE

## 2020-04-08 PROCEDURE — 80053 COMPREHEN METABOLIC PANEL: CPT | Performed by: FAMILY MEDICINE

## 2020-04-08 PROCEDURE — 84145 PROCALCITONIN (PCT): CPT | Performed by: FAMILY MEDICINE

## 2020-04-08 PROCEDURE — 99231 SBSQ HOSP IP/OBS SF/LOW 25: CPT | Performed by: FAMILY MEDICINE

## 2020-04-08 PROCEDURE — 93010 ELECTROCARDIOGRAM REPORT: CPT | Performed by: INTERNAL MEDICINE

## 2020-04-08 PROCEDURE — 85025 COMPLETE CBC W/AUTO DIFF WBC: CPT | Performed by: FAMILY MEDICINE

## 2020-04-08 PROCEDURE — 83735 ASSAY OF MAGNESIUM: CPT | Performed by: FAMILY MEDICINE

## 2020-04-08 PROCEDURE — 84100 ASSAY OF PHOSPHORUS: CPT | Performed by: FAMILY MEDICINE

## 2020-04-08 RX ORDER — METHYLPREDNISOLONE SODIUM SUCCINATE 125 MG/2ML
80 INJECTION, POWDER, LYOPHILIZED, FOR SOLUTION INTRAMUSCULAR; INTRAVENOUS DAILY
Status: DISCONTINUED | OUTPATIENT
Start: 2020-04-08 | End: 2020-04-08

## 2020-04-08 RX ORDER — POTASSIUM CHLORIDE 20 MEQ/1
40 TABLET, EXTENDED RELEASE ORAL ONCE
Status: COMPLETED | OUTPATIENT
Start: 2020-04-08 | End: 2020-04-08

## 2020-04-08 RX ADMIN — AZITHROMYCIN 250 MG: 250 TABLET, FILM COATED ORAL at 13:32

## 2020-04-08 RX ADMIN — MELATONIN 3 MG: at 21:18

## 2020-04-08 RX ADMIN — HYDROXYCHLOROQUINE SULFATE 400 MG: 200 TABLET, FILM COATED ORAL at 09:03

## 2020-04-08 RX ADMIN — HEPARIN SODIUM 5000 UNITS: 5000 INJECTION, SOLUTION INTRAVENOUS; SUBCUTANEOUS at 13:31

## 2020-04-08 RX ADMIN — OXYCODONE HYDROCHLORIDE AND ACETAMINOPHEN 1000 MG: 500 TABLET ORAL at 11:32

## 2020-04-08 RX ADMIN — HYDROXYCHLOROQUINE SULFATE 200 MG: 200 TABLET, FILM COATED ORAL at 17:01

## 2020-04-08 RX ADMIN — CEFTRIAXONE 1000 MG: 1 INJECTION, SOLUTION INTRAVENOUS at 13:32

## 2020-04-08 RX ADMIN — HEPARIN SODIUM 5000 UNITS: 5000 INJECTION, SOLUTION INTRAVENOUS; SUBCUTANEOUS at 21:18

## 2020-04-08 RX ADMIN — POTASSIUM CHLORIDE 40 MEQ: 1500 TABLET, EXTENDED RELEASE ORAL at 11:32

## 2020-04-08 RX ADMIN — OXYCODONE HYDROCHLORIDE AND ACETAMINOPHEN 1000 MG: 500 TABLET ORAL at 17:01

## 2020-04-08 RX ADMIN — ATORVASTATIN CALCIUM 40 MG: 40 TABLET, FILM COATED ORAL at 17:01

## 2020-04-08 RX ADMIN — ZINC SULFATE 220 MG (50 MG) CAPSULE 220 MG: CAPSULE at 09:03

## 2020-04-08 RX ADMIN — HEPARIN SODIUM 5000 UNITS: 5000 INJECTION, SOLUTION INTRAVENOUS; SUBCUTANEOUS at 05:11

## 2020-04-08 RX ADMIN — METHYLPREDNISOLONE SODIUM SUCCINATE 80 MG: 125 INJECTION, POWDER, FOR SOLUTION INTRAMUSCULAR; INTRAVENOUS at 11:33

## 2020-04-08 NOTE — SOCIAL WORK
LOS day 1  Pt is 30 d readmit, not a bundle, +COVID  Pt is fully independent, works full time  Pt lives w/ her son in 2 story home  Pt tested +COVID  Pt to return home at discharge  CM dept will remain available for any discharge needs     PCP: Dr Ngyuen Civil: [address] [place] [address] Drive

## 2020-04-08 NOTE — ASSESSMENT & PLAN NOTE
Patient presented complaining of visual hallucinations with fatigue and dizziness as part of her symptoms of COVID-19 viral infection  Symptoms had been present for 1 week prior to her presentation  MRI of brain performed on 03/31/2020 due to concern for encephalitis was negative  Neurology consult placed

## 2020-04-08 NOTE — ASSESSMENT & PLAN NOTE
Patient presented with visual hallucinations after suffering from COVID-19 symptoms since 04/01/2020 when she presented to her PCP and was tested  She presented to the ED  since her test results came back positive  She is currently on contact/airborne/droplet isolation  Continue therapy with hydroxychloroquine 400 mg PO q 12 hours x 1 day and then 200 mg PO q 12 hours x4 days instituted  Continue on azithromycin 500 mg PO x1 and then azithromycin 250 mg PO daily x4 days  Zinc sulfate 220 mg daily instituted  Continue vitamin-C  Baseline EKG on 04/07 showed QTc 438ms  Last EKG on 03/31/2020 showed QTc 442ms  Continue telemetry to monitor QTC interval daily  Inflammatory markers:  Procalcitonin elevated at 0 99, ferritin elevated at 1335, C-reactive protein increased from 217 7--> 239 9  Continue to trend inflammatory markers daily  Infectious disease consult placed and appreciate consult and recommendations  IV Solu-Medrol discontinued due to potential to exacerbate hallucinations and psychosis

## 2020-04-08 NOTE — NURSING NOTE
Asked patient if she would like me to call any of her family members to provide them with an update regarding her care  Patient declines at this time

## 2020-04-08 NOTE — PROGRESS NOTES
Progress Note - Kathie Ast 1955, 60 y o  female MRN: [id number]    Unit/Bed#: [address] [address] Encounter: [id number]    Primary Care Provider: Deepak Gerber DO   Date and time admitted to hospital: 4/7/2020 12:20 PM        * COVID-19 virus infection  Assessment & Plan  Patient presented with visual hallucinations after suffering from COVID-19 symptoms since 04/01/2020 when she presented to her PCP and was tested  She presented to the ED  since her test results came back positive  She is currently on contact/airborne/droplet isolation  Continue therapy with hydroxychloroquine 400 mg PO q 12 hours x 1 day and then 200 mg PO q 12 hours x4 days instituted  Continue on azithromycin 500 mg PO x1 and then azithromycin 250 mg PO daily x4 days  Zinc sulfate 220 mg daily instituted  Continue vitamin-C  Baseline EKG on 04/07 showed QTc 438ms  Last EKG on 03/31/2020 showed QTc 442ms  Continue telemetry to monitor QTC interval daily  Inflammatory markers:  Procalcitonin elevated at 0 99, ferritin elevated at 1335, C-reactive protein increased from 217 7--> 239 9  Continue to trend inflammatory markers daily  Infectious disease consult placed and appreciate consult and recommendations  IV Solu-Medrol discontinued due to potential to exacerbate hallucinations and psychosis  Hyponatremia  Assessment & Plan  Most likely secondary to decreased oral intake from pneumonia  Continue to encourage oral hydration  Sodium improved from 128--> 131  Hypokalemia  Assessment & Plan  Will replace with oral potassium chloride 40 mEq PO x1 as well as 20 mEq potassium chloride IV x1  Monitor electrolytes-K and Mag daily  Hallucinations  Assessment & Plan  Patient presented complaining of visual hallucinations with fatigue and dizziness as part of her symptoms of COVID-19 viral infection  Symptoms had been present for 1 week prior to her presentation    MRI of brain performed on 03/31/2020 due to concern for encephalitis was negative  Neurology consult placed  VTE Pharmacologic Prophylaxis:   Pharmacologic: Heparin  Mechanical VTE Prophylaxis in Place: No    Patient Centered Rounds: I have performed bedside rounds with nursing staff today  Discussions with Specialists or Other Care Team Provider:  Yes    Education and Discussions with Family / Patient:  Yes    Time Spent for Care: 15 minutes  More than 50% of total time spent on counseling and coordination of care as described above  Current Length of Stay: 1 day(s)    Current Patient Status: Inpatient   Certification Statement: The patient will continue to require additional inpatient hospital stay due to COVID-19 viral infection    Discharge Plan: To be determined    Code Status: Level 1 - Full Code      Subjective:   Patient denies any chest pain or shortness of breath  Objective:     Vitals:   Temp (24hrs), Av °F (37 2 °C), Min:98 3 °F (36 8 °C), Max:100 °F (37 8 °C)    Temp:  [98 3 °F (36 8 °C)-100 °F (37 8 °C)] 98 3 °F (36 8 °C)  HR:  [70-95] 70  Resp:  [16-20] 18  BP: (102-113)/(53-66) 111/53  SpO2:  [96 %-98 %] 98 %  Body mass index is 26 61 kg/m²  Input and Output Summary (last 24 hours):        Intake/Output Summary (Last 24 hours) at 2020 1722  Last data filed at 2020 0900  Gross per 24 hour   Intake 300 ml   Output 400 ml   Net -100 ml       Physical Exam:  Deferred    Physical Exam  Additional Data:     Labs:    Results from last 7 days   Lab Units 20  0452   WBC Thousand/uL 9 08   HEMOGLOBIN g/dL 11 7   HEMATOCRIT % 34 9   PLATELETS Thousands/uL 339   NEUTROS PCT % 78*   LYMPHS PCT % 14   MONOS PCT % 7   EOS PCT % 0     Results from last 7 days   Lab Units 20  0452   SODIUM mmol/L 131*   POTASSIUM mmol/L 3 1*   CHLORIDE mmol/L 93*   CO2 mmol/L 27   BUN mg/dL 12   CREATININE mg/dL 1 13   ANION GAP mmol/L 11   CALCIUM mg/dL 9 6   ALBUMIN g/dL 3 0*   TOTAL BILIRUBIN mg/dL 0 60   ALK PHOS U/L 106   ALT U/L 64   AST U/L 58*   GLUCOSE RANDOM mg/dL 108                 Results from last 7 days   Lab Units 04/08/20  0452   PROCALCITONIN ng/ml 0 99*           * I Have Reviewed All Lab Data Listed Above  * Additional Pertinent Lab Tests Reviewed: [facility] Admission Reviewed    Imaging:    Imaging Reports Reviewed Today Include:  None available  Imaging Personally Reviewed by Myself Includes:  None    Recent Cultures (last 7 days):     Results from last 7 days   Lab Units 04/07/20  1933   URINE CULTURE  40,000-49,000 cfu/ml Beta Hemolytic Streptococcus Group B*       Last 24 Hours Medication List:     Current Facility-Administered Medications:  acetaminophen 650 mg Oral Q6H PRN TEDDY Guardado   ascorbic acid 1,000 mg Oral BID Alonzo Montemayor MD   atorvastatin 40 mg Oral Daily With Vivi Solares MD   azithromycin 250 mg Oral Q24H Alonzo Montemayor MD   heparin (porcine) 5,000 Units Subcutaneous Q8H [facility] Alonzo Montemayor MD   hydroxychloroquine 200 mg Oral BID Alonzo Montemayor MD   melatonin 3 mg Oral HS TEDDY Guardado   ondansetron 4 mg Intravenous Q6H PRN Alonzo Montemayor MD   zinc sulfate 220 mg Oral Daily Alonzo Montemayor MD        Today, Patient Was Seen By: Alonzo Montemayor MD    ** Please Note: Dictation voice to text software may have been used in the creation of this document   **

## 2020-04-08 NOTE — ASSESSMENT & PLAN NOTE
Most likely secondary to decreased oral intake from pneumonia  Continue to encourage oral hydration  Sodium improved from 128--> 131

## 2020-04-08 NOTE — CONSULTS
Consultation - Infectious Disease   Warren Thornton 60 y o  female MRN: [id number]  Unit/Bed#: [address] [address] Encounter: [id number]      IMPRESSION & RECOMMENDATIONS:     24-year-old female patient admitted 04/07/2020 for fatigue, visual hallucinations, dizziness after being tested positive for COVID-19    1- COVID 19 pneumonia:  Patient is currently afebrile, no hypoxia;  Elevated inflammatory markers noted  Patient is on day 2 of hydroxy chloroquine and azithromycin today  - continue hydroxychloroquine and azithromycin for 5 days course through 04/11/2020  - monitor QTc interval closely and monitor glycemia daily while on hydroxychloroquine  - monitor respiratory status closely  - no current evidence of superimposed bacterial pneumonia, stop ceftriaxone and monitor clinically off antibiotics    2- dizziness and visual hallucinations:  Patient reports visual hallucinations described as lights and shadows that started 1 week prior to presentation  Unclear if the symptoms are secondary to COVID -19 infection; concern for encephalitis  MRI done 03/31/2020 reviewed  No signs of abnormalities noted  - consider Neurology evaluation  - continue treatment for COVID-19 infection as mentioned above  - close clinical monitoring of mental status  - frequent neuro checks    3- hyponatremia:  Sodium of 128 on admission, currently sodium 131  - monitor BMP  - rest of management as per primary service    Plan mentioned above discussed with patient  Case discussed with primary service      HISTORY OF PRESENT ILLNESS:  Reason for Consult:  Covid-19 pneumonia  HPI: Warren Thornton is a 60y o  year old female usually healthy, admitted 04/07/2020 after  COVID-19 test came back positive  Patient was recently admitted to [facility] from 03/31/2022 04/01/2020 complaining of dizziness that started 3 days prior to presentation, along with nausea, chills    A CTA head as well as MRI brain was done which was negative; her dizziness was attributed to possible viral illness, and a COVID pcr test was sent  Patient was evaluated by her family physician 1 day prior to current admission, which she reported fatigue and diarrhea  Patient also reports visual hallucinations described as shadows and flashes  Patient was recommended to present to the ER for possible admission  During her current hospital stay, patient has been afebrile with T-max of 100°, saturating 90% on room air  Her x-ray showed signs of ground-glass opacity consistent with diagnosis of viral pneumonia  Patient was started on hydroxychloroquine and azithromycin on 04/07/2020    On evaluation today, patient reports mild improvement in her fatigue  She denies chills  She is AAO*3  She reports visual hallucinations that has been occurring over the past week  Patient denies any similar history of visual hallucinations in the past    REVIEW OF SYSTEMS:  A complete review of systems is negative other than that noted in the HPI  PAST MEDICAL HISTORY:  Past Medical History:   Diagnosis Date    H/O antinuclear antibodies     [facility]ic [facility] ) 3/31/2020    Urinary tract infection     last assessed: 10/24/2013     Past Surgical History:   Procedure Laterality Date    APPENDECTOMY         FAMILY HISTORY:  Non-contributory    SOCIAL HISTORY:  Social History   Social History     Substance and Sexual Activity   Alcohol Use Not Currently    Frequency: Never    Binge frequency: Never     Social History     Substance and Sexual Activity   Drug Use Not Currently     Social History     Tobacco Use   Smoking Status Never Smoker   Smokeless Tobacco Never Used       ALLERGIES:  No Known Allergies    MEDICATIONS:  All current active medications have been reviewed        PHYSICAL EXAM:  Temp:  [98 6 °F (37 °C)-100 °F (37 8 °C)] 99 3 °F (37 4 °C)  HR:  [71-95] 78  Resp:  [16-20] 16  BP: (102-113)/(55-66) 113/55  SpO2:  [96 %-98 %] 97 %  Temp (24hrs), Av 1 °F (37 3 °C), Min:98 6 °F (37 °C), Max:100 °F (37 8 °C)  Current: Temperature: 99 3 °F (37 4 °C)    Intake/Output Summary (Last 24 hours) at 2020 1547  Last data filed at 2020 0900  Gross per 24 hour   Intake 300 ml   Output 400 ml   Net -100 ml     Patient seen through  Videocall  Her nurse was present at the bedside, directing the ipad camera and performing physical exam  Parts of Physical exam noted below performed by nursing staff and by primary service and discussed with me due to current COVID crisis and to limit exposure      General Appearance:  Appearing well, nontoxic, and in no distress   Head:  Normocephalic, without obvious abnormality, atraumatic   Eyes:  Conjunctiva pink and sclera anicteric, both eyes   Nose: Nares normal, mucosa normal, no drainage   Throat: Oropharynx moist without lesions   Neck: Supple, symmetrical, no adenopathy, no tenderness/mass/nodules   Back:   Symmetric, no curvature, ROM normal, no CVA tenderness   Lungs:   Clear to auscultation bilaterally, respirations unlabored   Chest Wall:  No tenderness or deformity   Heart:  RRR; no murmur, rub or gallop   Abdomen:   Soft, non-tender, non-distended, positive bowel sounds    Extremities: No cyanosis, clubbing or edema   Skin: No rashes or lesions  No draining wounds noted  Lymph nodes: Cervical, supraclavicular nodes normal   Neurologic: Alert and oriented times 3, extremity strength 5/5 and symmetric       LABS, IMAGING, & OTHER STUDIES:  Lab Results:  I have personally reviewed pertinent labs    Results from last 7 days   Lab Units 20  0452 20  1927 20  1243   WBC Thousand/uL 9 08  --  9 05   HEMOGLOBIN g/dL 11 7  --  12 0   PLATELETS Thousands/uL 339 316 270     Results from last 7 days   Lab Units 20  0452 20  1243   SODIUM mmol/L 131* 128*   POTASSIUM mmol/L 3 1* 3 4*   CHLORIDE mmol/L 93* 89*   CO2 mmol/L 27 26   BUN mg/dL 12 13   CREATININE mg/dL 1 13 1 22   EGFR ml/min/1 73sq m 60 55   CALCIUM mg/dL 9 6 9 0   AST U/L 58* 94*   ALT U/L 64 81*   ALK PHOS U/L 106 118*         Results from last 7 days   Lab Units 04/08/20  0452   PROCALCITONIN ng/ml 0 99*       Imaging Studies:   I have personally reviewed pertinent imaging study reports and images in PACS  CT head showing no acute intracranial bleeding, no mass effect or midline shift  MRI brain done as outpatient 03/31/2020 due to dizziness showing no significant intracranial pathology  Chest x-ray done yesterday 04/07/2020 showing subtle peripheral right more than left ground-glass opacity compatible with a viral pneumonia    Other Studies:   I have personally reviewed pertinent reports

## 2020-04-08 NOTE — UTILIZATION REVIEW
Initial Clinical Review    Admission: Date/Time/Statement: Admission Orders (From admission, onward)     Ordered        04/07/20 1405  Inpatient Admission (expected length of stay for this patient Order details is greater than two midnights)  Once                   Orders Placed This Encounter   Procedures    Inpatient Admission (expected length of stay for this patient Order details is greater than two midnights)     Standing Status:   Standing     Number of Occurrences:   1     Order Specific Question:   Admitting Physician     Answer:   Heather Burks [[id number]]     Order Specific Question:   Level of Care     Answer:   Med Surg [[id number]]     Order Specific Question:   Estimated length of stay     Answer:   More than 2 Midnights     Order Specific Question:   Certification     Answer:   I certify that inpatient services are medically necessary for this patient for a duration of greater than two midnights  See H&P and MD Progress Notes for additional information about the patient's course of treatment  ED Arrival Information     Expected Arrival Acuity Means of Arrival Escorted By Service Admission Type    - 4/7/2020 12:17 Urgent Ambulance [place] Hospitalist Urgent    Arrival Complaint    covid        Chief Complaint   Patient presents with    Hallucinations     Pt states that she has been seeing shadows on the walls since this AM     Assessment/Plan: 60 y o  female who presents with symptoms of fatigue and hallucinations which she had been suffering since 04/01/2020 when she presented to her PCP and she was tested for COVID-19  Her test results returned positive and she was instructed by her PCP to go to the ED for treatment  On presentation to the ED at [facility] she was noted to have a low-grade temperature of 99 6° F but was oxygenating at 98% on room air  Portable chest x-ray revealed subtle peripheral right greater than left ground-glass opacity, compatible with viral pneumonia    CT of the head without contrast was also negative  patient admitted inpatient admission , started on therapy with hydroxychloroquine , azithromycin, zinc sulfate, vitamin-C  She was also placed on contact/airborne/droplet isolation  She will be placed on telemetry and admitted to the medical-surgical unit  Patient denies any sick contacts  Hyponatremia 2/2 decreased oral intake from pneumonia continue encourage oral hydration monitor na on bmp qd  Hypokalemia replace woth oral k monitor electrolytes  ED Triage Vitals   Temperature Pulse Respirations Blood Pressure SpO2   04/07/20 1228 04/07/20 1228 04/07/20 1228 04/07/20 1228 04/07/20 1228   98 4 °F (36 9 °C) 86 22 117/55 90 %      Temp Source Heart Rate Source Patient Position - Orthostatic VS BP Location FiO2 (%)   04/07/20 1228 04/07/20 1430 04/07/20 1228 04/07/20 1228 --   Oral Monitor Lying Left arm       Pain Score       04/07/20 1500       No Pain        Wt Readings from Last 1 Encounters:   04/07/20 79 4 kg (175 lb)     Additional Vital Signs:   04/07/20 2300  98 7 °F (37 1 °C)  71  19  109/66  81  98 %  Nasal cannula   Lying   O2 Device: 02 is on 2 at 04/07/20 2300   04/07/20 2045  100 °F (37 8 °C)  95  20  102/57    96 %  Nasal cannula  Lying   04/07/20 1430  99 6 °F (37 6 °C)  88  21  124/56      Nasal cannula  Lying   04/07/20 1251    87  22  119/62    98 %  Nasal cannula  Lying   04/07/20 1230            90 %         Pertinent Labs/Diagnostic Test Results:     ct brain   No acute intracranial hemorrhage  No mass effect or midline shift  cxr Subtle peripheral right greater than left ground glass opacity, compatible with viral pneumonia    Results from last 7 days   Lab Units 04/08/20  0452 04/07/20  1927 04/07/20  1243   WBC Thousand/uL 9 08  --  9 05   HEMOGLOBIN g/dL 11 7  --  12 0   HEMATOCRIT % 34 9  --  34 9   PLATELETS Thousands/uL 339 316 270   NEUTROS ABS Thousands/µL 7 03  --  7 12     Results from last 7 days   Lab Units 04/08/20  0452 04/07/20  1243   SODIUM mmol/L 131* 128*   POTASSIUM mmol/L 3 1* 3 4*   CHLORIDE mmol/L 93* 89*   CO2 mmol/L 27 26   ANION GAP mmol/L 11 13   BUN mg/dL 12 13   CREATININE mg/dL 1 13 1 22   EGFR ml/min/1 73sq m 60 55   CALCIUM mg/dL 9 6 9 0   MAGNESIUM mg/dL 2 6 2 1   PHOSPHORUS mg/dL 3 9  --      Results from last 7 days   Lab Units 04/08/20  0452 04/07/20  1243   AST U/L 58* 94*   ALT U/L 64 81*   ALK PHOS U/L 106 118*   TOTAL PROTEIN g/dL 8 9* 8 8*   ALBUMIN g/dL 3 0* 3 0*   TOTAL BILIRUBIN mg/dL 0 60 0 70     Results from last 7 days   Lab Units 04/08/20  0452 04/07/20  1243   GLUCOSE RANDOM mg/dL 108 101     Results from last 7 days   Lab Units 04/07/20  1243   TROPONIN I ng/mL <0 02     Results from last 7 days   Lab Units 04/07/20  1927   D-DIMER QUANTITATIVE ug/ml FEU 1 97*     Results from last 7 days   Lab Units 04/07/20  1243   TSH 3RD GENERATON uIU/mL 0 804     Results from last 7 days   Lab Units 04/08/20  0452   PROCALCITONIN ng/ml 0 99*     Results from last 7 days   Lab Units 04/08/20  0452   FERRITIN ng/mL 1,335*     Results from last 7 days   Lab Units 04/08/20  0452 04/07/20  1243   CRP mg/L 239 9* 217 7*   SED RATE mm/hour  --  96*     Results from last 7 days   Lab Units 04/07/20  1927   CLARITY UA  Clear   COLOR UA  Yellow   SPEC GRAV UA  <=1 005   PH UA  6 5   GLUCOSE UA mg/dl Negative   KETONES UA mg/dl Trace*   BLOOD UA  Trace-Intact*   PROTEIN UA mg/dl Negative   NITRITE UA  Negative   BILIRUBIN UA  Negative   UROBILINOGEN UA E U /dl 0 2   LEUKOCYTES UA  Small*   WBC UA /hpf 2-4*   RBC UA /hpf 0-1*   BACTERIA UA /hpf Moderate*   EPITHELIAL CELLS WET PREP /hpf Occasional     ED Treatment:   Medication Administration from 04/07/2020 1217 to 04/07/2020 1559     None        Past Medical History:   Diagnosis Date    H/O antinuclear antibodies     [facility]    Urinary tract infection     last assessed: 10/24/2013     Present on Admission:   COVID-19 virus infection   Hyponatremia   Hypokalemia      Admitting Diagnosis: Hallucinations [R44 3]  COVID-19 [U07 1, J98 8]  Age/Sex: 60 y o  female  Admission Orders:  TELE MON  [place],CONTACT ISOLATION  Bmp cbc diff  c reactive protein  procalcitonin  Ferritin  Urine cx  Scheduled Medications:    Medications:  ascorbic acid 1,000 mg Oral BID   atorvastatin 40 mg Oral Daily With Dinner   azithromycin 250 mg Oral Q24H   cefTRIAXone 1,000 mg Intravenous Q24H   heparin (porcine) 5,000 Units Subcutaneous Q8H [address]   hydroxychloroquine 200 mg Oral BID   melatonin 3 mg Oral HS   methylPREDNISolone sodium succinate 80 mg Intravenous Daily   zinc sulfate 220 mg Oral Daily     Continuous IV Infusions:     PRN Meds:    acetaminophen 650 mg Oral Q6H PRN   ondansetron 4 mg Intravenous Q6H PRN       IP CONSULT TO CASE MANAGEMENT  IP CONSULT TO INFECTIOUS DISEASES  IP CONSULT TO PULMONOLOGY    Network Utilization Review Department  [email or web address]  ATTENTION: Please call with any questions or concerns to [phone] and carefully listen to the prompts so that you are directed to the right person  All voicemails are confidential   Lalo Lockhart all requests for admission clinical reviews, approved or denied determinations and any other requests to dedicated fax number below belonging to the campus where the patient is receiving treatment  List of dedicated fax numbers for the Facilities:  FACILITY NAME UR FAX NUMBER   ADMISSION DENIALS (Administrative/Medical Necessity) [phone]   [address]  (Maternity/NICU/Pediatrics) [phone]   Hyacinth Roth [phone]   [place] [phone]   Surjit Hanson [phone]   [facility] [place] [address] [facility] [phone]   [facility] [address] [phone]     [address] [phone]

## 2020-04-08 NOTE — ASSESSMENT & PLAN NOTE
Will replace with oral potassium chloride 40 mEq PO x1 as well as 20 mEq potassium chloride IV x1  Monitor electrolytes-K and Mag daily

## 2020-04-08 NOTE — PHYSICIAN ADVISOR
Current patient class: Inpatient  The patient is currently on Hospital Day: 2 at [address] [place] [address]      The patient was admitted to the hospital at [phone] on 4/7/20 for the following diagnosis:  Hallucinations [R44 3]  COVID-19 [U07 1, J98 8]         After review of the relevant documentation, labs, vital signs and test results, the patient is appropriate for INPATIENT ADMISSION  Admission to the hospital as an inpatient is a complex decision making process which requires the practitioner to consider the patients presenting complaint, history and physical examination and all relevant testing  With this in mind, in this case, the patient was deemed appropriate for INPATIENT ADMISSION  After review of the documentation and testing available at the time of the admission I concur with this clinical determination of medical necessity  Rationale is as follows: The patient is a 60 yrs old Female who presented to the ED at 4/7/2020 12:20 PM with a chief complaint of Hallucinations (Pt states that she has been seeing shadows on the walls since this AM)   Patient admitted with hallucination  She is known COVID positive PTA 4/1/20  CXR 4/7/20 c/w viral pneumonia  CT head negative  She is requiring supplemental oxygen by nasal cannula  SHe has had temperature to 100  She has been started on azithromycin, hydroxychloroquine, Vit c and zinc as well as solumedrol 80mg IV daily  She is also receiving rocephin IV  Consults are pending to pulmonology and infectious disease      The patients vitals on arrival were ED Triage Vitals   Temperature Pulse Respirations Blood Pressure SpO2   04/07/20 1228 04/07/20 1228 04/07/20 1228 04/07/20 1228 04/07/20 1228   98 4 °F (36 9 °C) 86 22 117/55 90 %      Temp Source Heart Rate Source Patient Position - Orthostatic VS BP Location FiO2 (%)   04/07/20 1228 04/07/20 1430 04/07/20 1228 04/07/20 1228 --   Oral Monitor Lying Left arm       Pain Score       04/07/20 1500 No Pain           Past Medical History:   Diagnosis Date    H/O antinuclear antibodies     [facility]ic [facility] response [facility]) ([facility] ) 3/31/2020    Urinary tract infection     last assessed: 10/24/2013     Past Surgical History:   Procedure Laterality Date    APPENDECTOMY             Consults have been placed to:   IP CONSULT TO CASE MANAGEMENT  IP CONSULT TO INFECTIOUS DISEASES  IP CONSULT TO PULMONOLOGY    Vitals:    04/08/20 0500 04/08/20 0831 04/08/20 1155 04/08/20 1300   BP: 105/60 109/59 106/59 113/55   BP Location: Left arm Left arm Left arm Left arm   Pulse: 71 84 82 78   Resp: 19 18 19 16   Temp: 98 6 °F (37 °C) 99 °F (37 2 °C) 98 9 °F (37 2 °C) 99 3 °F (37 4 °C)   TempSrc: Oral Oral Oral Oral   SpO2: 98% 96% 97% 97%   Weight:       Height:           Most recent labs:    Recent Labs     04/07/20  1243  04/08/20  0452   WBC 9 05  --  9 08   HGB 12 0  --  11 7   HCT 34 9  --  34 9      < > 339   K 3 4*  --  3 1*   CALCIUM 9 0  --  9 6   BUN 13  --  12   CREATININE 1 22  --  1 13   TROPONINI <0 02  --   --    AST 94*  --  58*   ALT 81*  --  64   ALKPHOS 118*  --  106    < > = values in this interval not displayed         Scheduled Meds:  Current Facility-Administered Medications:  acetaminophen 650 mg Oral Q6H PRN Rafal Fulling, CRNP    ascorbic acid 1,000 mg Oral BID Bryant Dickey MD    atorvastatin 40 mg Oral Daily With Cece Lama MD    azithromycin 250 mg Oral Q24H Bryant Dickey MD    cefTRIAXone 1,000 mg Intravenous Q24H Bryant Dickey MD Last Rate: 1,000 mg (04/08/20 1332)   heparin (porcine) 5,000 Units Subcutaneous Q8H [address] Adriana Liriano MD    hydroxychloroquine 200 mg Oral BID Bryant Dickey MD    melatonin 3 mg Oral HS Rafal Fulling, CRNP    methylPREDNISolone sodium succinate 80 mg Intravenous Daily Adriana Liriano MD    ondansetron 4 mg Intravenous Q6H PRN Bryant Dickey MD    zinc sulfate 220 mg Oral Daily Bryant Dickey MD      Continuous Infusions:   PRN Meds:   acetaminophen    ondansetron    Surgical procedures (if appropriate):

## 2020-04-09 ENCOUNTER — TELEPHONE (OUTPATIENT)
Dept: FAMILY MEDICINE CLINIC | Facility: CLINIC | Age: 65
End: 2020-04-09

## 2020-04-09 PROBLEM — R82.71 ASYMPTOMATIC BACTERIURIA: Status: ACTIVE | Noted: 2020-04-09

## 2020-04-09 PROBLEM — J12.82 PNEUMONIA DUE TO COVID-19 VIRUS: Status: ACTIVE | Noted: 2020-04-09

## 2020-04-09 PROBLEM — U07.1 PNEUMONIA DUE TO COVID-19 VIRUS: Status: ACTIVE | Noted: 2020-04-09

## 2020-04-09 LAB
ANION GAP SERPL CALCULATED.3IONS-SCNC: 12 MMOL/L (ref 4–13)
BASOPHILS # BLD AUTO: 0.01 THOUSANDS/ΜL (ref 0–0.1)
BASOPHILS NFR BLD AUTO: 0 % (ref 0–1)
BUN SERPL-MCNC: 16 MG/DL (ref 5–25)
CALCIUM SERPL-MCNC: 9.7 MG/DL (ref 8.3–10.1)
CHLORIDE SERPL-SCNC: 94 MMOL/L (ref 100–108)
CO2 SERPL-SCNC: 24 MMOL/L (ref 21–32)
CREAT SERPL-MCNC: 1.13 MG/DL (ref 0.6–1.3)
CRP SERPL QL: 180.3 MG/L
EOSINOPHIL # BLD AUTO: 0 THOUSAND/ΜL (ref 0–0.61)
EOSINOPHIL NFR BLD AUTO: 0 % (ref 0–6)
ERYTHROCYTE [DISTWIDTH] IN BLOOD BY AUTOMATED COUNT: 12.5 % (ref 11.6–15.1)
FERRITIN SERPL-MCNC: 1322 NG/ML (ref 8–388)
GFR SERPL CREATININE-BSD FRML MDRD: 60 ML/MIN/1.73SQ M
GLUCOSE SERPL-MCNC: 145 MG/DL (ref 65–140)
HCT VFR BLD AUTO: 32.6 % (ref 34.8–46.1)
HGB BLD-MCNC: 11.1 G/DL (ref 11.5–15.4)
IMM GRANULOCYTES # BLD AUTO: 0.09 THOUSAND/UL (ref 0–0.2)
IMM GRANULOCYTES NFR BLD AUTO: 1 % (ref 0–2)
LYMPHOCYTES # BLD AUTO: 1.06 THOUSANDS/ΜL (ref 0.6–4.47)
LYMPHOCYTES NFR BLD AUTO: 10 % (ref 14–44)
MCH RBC QN AUTO: 26.8 PG (ref 26.8–34.3)
MCHC RBC AUTO-ENTMCNC: 34 G/DL (ref 31.4–37.4)
MCV RBC AUTO: 79 FL (ref 82–98)
MONOCYTES # BLD AUTO: 0.67 THOUSAND/ΜL (ref 0.17–1.22)
MONOCYTES NFR BLD AUTO: 6 % (ref 4–12)
NEUTROPHILS # BLD AUTO: 9.29 THOUSANDS/ΜL (ref 1.85–7.62)
NEUTS SEG NFR BLD AUTO: 83 % (ref 43–75)
NRBC BLD AUTO-RTO: 0 /100 WBCS
PLATELET # BLD AUTO: 396 THOUSANDS/UL (ref 149–390)
PMV BLD AUTO: 10.6 FL (ref 8.9–12.7)
POTASSIUM SERPL-SCNC: 3.4 MMOL/L (ref 3.5–5.3)
PROCALCITONIN SERPL-MCNC: 0.7 NG/ML
RBC # BLD AUTO: 4.14 MILLION/UL (ref 3.81–5.12)
SODIUM SERPL-SCNC: 130 MMOL/L (ref 136–145)
WBC # BLD AUTO: 11.12 THOUSAND/UL (ref 4.31–10.16)

## 2020-04-09 PROCEDURE — 86140 C-REACTIVE PROTEIN: CPT | Performed by: FAMILY MEDICINE

## 2020-04-09 PROCEDURE — 99233 SBSQ HOSP IP/OBS HIGH 50: CPT | Performed by: INTERNAL MEDICINE

## 2020-04-09 PROCEDURE — 84145 PROCALCITONIN (PCT): CPT | Performed by: FAMILY MEDICINE

## 2020-04-09 PROCEDURE — 85025 COMPLETE CBC W/AUTO DIFF WBC: CPT | Performed by: FAMILY MEDICINE

## 2020-04-09 PROCEDURE — 80048 BASIC METABOLIC PNL TOTAL CA: CPT | Performed by: FAMILY MEDICINE

## 2020-04-09 PROCEDURE — 82728 ASSAY OF FERRITIN: CPT | Performed by: FAMILY MEDICINE

## 2020-04-09 PROCEDURE — 99232 SBSQ HOSP IP/OBS MODERATE 35: CPT | Performed by: FAMILY MEDICINE

## 2020-04-09 PROCEDURE — 99255 IP/OBS CONSLTJ NEW/EST HI 80: CPT | Performed by: NURSE PRACTITIONER

## 2020-04-09 RX ORDER — LANOLIN ALCOHOL/MO/W.PET/CERES
3 CREAM (GRAM) TOPICAL ONCE
Status: COMPLETED | OUTPATIENT
Start: 2020-04-09 | End: 2020-04-09

## 2020-04-09 RX ORDER — SODIUM CHLORIDE 9 MG/ML
100 INJECTION, SOLUTION INTRAVENOUS ONCE
Status: COMPLETED | OUTPATIENT
Start: 2020-04-09 | End: 2020-04-10

## 2020-04-09 RX ORDER — LANOLIN ALCOHOL/MO/W.PET/CERES
3 CREAM (GRAM) TOPICAL
Status: DISCONTINUED | OUTPATIENT
Start: 2020-04-09 | End: 2020-04-09

## 2020-04-09 RX ORDER — SODIUM CHLORIDE 9 MG/ML
100 INJECTION, SOLUTION INTRAVENOUS ONCE
Status: DISCONTINUED | OUTPATIENT
Start: 2020-04-09 | End: 2020-04-09 | Stop reason: SDUPTHER

## 2020-04-09 RX ORDER — LANOLIN ALCOHOL/MO/W.PET/CERES
6 CREAM (GRAM) TOPICAL
Status: DISCONTINUED | OUTPATIENT
Start: 2020-04-09 | End: 2020-04-10 | Stop reason: HOSPADM

## 2020-04-09 RX ORDER — SODIUM CHLORIDE 9 MG/ML
100 INJECTION, SOLUTION INTRAVENOUS CONTINUOUS
Status: DISCONTINUED | OUTPATIENT
Start: 2020-04-09 | End: 2020-04-09

## 2020-04-09 RX ORDER — POTASSIUM CHLORIDE 20 MEQ/1
40 TABLET, EXTENDED RELEASE ORAL ONCE
Status: COMPLETED | OUTPATIENT
Start: 2020-04-09 | End: 2020-04-09

## 2020-04-09 RX ADMIN — SODIUM CHLORIDE 250 ML: 0.9 INJECTION, SOLUTION INTRAVENOUS at 20:45

## 2020-04-09 RX ADMIN — SODIUM CHLORIDE 100 ML/HR: 0.9 INJECTION, SOLUTION INTRAVENOUS at 23:00

## 2020-04-09 RX ADMIN — OXYCODONE HYDROCHLORIDE AND ACETAMINOPHEN 1000 MG: 500 TABLET ORAL at 17:52

## 2020-04-09 RX ADMIN — HEPARIN SODIUM 5000 UNITS: 5000 INJECTION, SOLUTION INTRAVENOUS; SUBCUTANEOUS at 06:25

## 2020-04-09 RX ADMIN — AZITHROMYCIN 250 MG: 250 TABLET, FILM COATED ORAL at 15:36

## 2020-04-09 RX ADMIN — HYDROXYCHLOROQUINE SULFATE 200 MG: 200 TABLET, FILM COATED ORAL at 09:41

## 2020-04-09 RX ADMIN — POTASSIUM CHLORIDE 40 MEQ: 1500 TABLET, EXTENDED RELEASE ORAL at 17:52

## 2020-04-09 RX ADMIN — HEPARIN SODIUM 5000 UNITS: 5000 INJECTION, SOLUTION INTRAVENOUS; SUBCUTANEOUS at 15:36

## 2020-04-09 RX ADMIN — MELATONIN 3 MG: at 00:57

## 2020-04-09 RX ADMIN — HYDROXYCHLOROQUINE SULFATE 200 MG: 200 TABLET, FILM COATED ORAL at 17:52

## 2020-04-09 RX ADMIN — ACETAMINOPHEN 650 MG: 325 TABLET ORAL at 12:52

## 2020-04-09 RX ADMIN — ZINC SULFATE 220 MG (50 MG) CAPSULE 220 MG: CAPSULE at 09:41

## 2020-04-09 RX ADMIN — MELATONIN 6 MG: at 22:06

## 2020-04-09 RX ADMIN — ATORVASTATIN CALCIUM 40 MG: 40 TABLET, FILM COATED ORAL at 15:36

## 2020-04-09 RX ADMIN — HEPARIN SODIUM 5000 UNITS: 5000 INJECTION, SOLUTION INTRAVENOUS; SUBCUTANEOUS at 22:06

## 2020-04-09 RX ADMIN — OXYCODONE HYDROCHLORIDE AND ACETAMINOPHEN 1000 MG: 500 TABLET ORAL at 09:41

## 2020-04-09 RX ADMIN — ACETAMINOPHEN 650 MG: 325 TABLET ORAL at 22:06

## 2020-04-09 NOTE — ASSESSMENT & PLAN NOTE
Most likely secondary to decreased oral intake from pneumonia  Continue to encourage oral hydration  Sodium improved from 128--> 131--> 130

## 2020-04-09 NOTE — ASSESSMENT & PLAN NOTE
Urine culture growing [id number] group B strep  Urinalysis only shows 2-4 WBC and patient is asymptomatic  Continue to monitor off antibiotics

## 2020-04-09 NOTE — PROGRESS NOTES
Progress Note - Mary Ball 1955, 60 y o  female MRN: [id number]    Unit/Bed#: [address] [facility] Encounter: [id number]    Primary Care Provider: Eliazar Welch DO   Date and time admitted to hospital: 4/7/2020 12:20 PM        COVID-19 virus infection  Assessment & Plan  Patient presented with visual hallucinations after suffering from COVID-19 symptoms since 04/01/2020 when she presented to her PCP and was tested  She presented to the ED  since her test results came back positive  She is currently on contact/airborne/droplet isolation  Continue therapy with hydroxychloroquine 400 mg PO q 12 hours x 1 day and then 200 mg PO q 12 hours x4 days instituted  Continue on azithromycin 500 mg PO x1 and then azithromycin 250 mg PO daily x4 days  Zinc sulfate 220 mg daily instituted  Continue vitamin-C  Baseline EKG on 04/07 showed QTc 438ms  Last EKG on 03/31/2020 showed QTc 442ms  Continue telemetry to monitor QTC interval daily  Inflammatory markers:  Procalcitonin elevated at 0 99, ferritin elevated at 1335, C-reactive protein increased from 217 7--> 239 9  Continue to trend inflammatory markers daily  Infectious disease consult placed and appreciate consult and recommendations  IV Solu-Medrol discontinued due to potential to exacerbate hallucinations and psychosis  * Pneumonia due to COVID-19 virus  Assessment & Plan  Patient presented with visual hallucinations after suffering from COVID-19 symptoms since 04/01/2020 when she presented to her PCP and was tested  She presented to the ED  since her test results came back positive  She is currently on contact/airborne/droplet isolation  Continue therapy with hydroxychloroquine 200 mg PO Q 12h x4 days to end on Saturday 4/11  Continue azithromycin 250 mg PO daily x 4 days to end on Saturday 4/11  Baseline EKG on 04/07 showed QTc 438ms  Last EKG on 03/31/2020 showed QTc 442ms    Continue telemetry to monitor QTC interval daily and also monitor glycemia while on hydroxychloroquine  Inflammatory markers:  Procalcitonin trend 0 99-->0 70, ferritin trend 1355--> 1322, C-reactive protein trend 217 7-->239 9-->180  3  Continue to trend inflammatory markers daily  Patient seems to be improving  Appreciate ID recommendations-due to no current evidence of superimposed bacterial pneumonia, all antibiotics including Rocephin have been discontinued  Hyponatremia  Assessment & Plan  Most likely secondary to decreased oral intake from pneumonia  Continue to encourage oral hydration  Sodium improved from 128--> 131--> 130  Hypokalemia  Assessment & Plan  Current potassium 3 4  Will replace with potassium chloride 40 mEq PO  Hallucinations  Assessment & Plan  Patient presented complaining of visual hallucinations with fatigue and dizziness as part of her symptoms of COVID-19 viral infection  Symptoms had been present for 1 week prior to her presentation  MRI of brain performed on 03/31/2020 due to concern for encephalitis was negative  Neurology consult placed-and no further neurologic interventional recommendations as it is felt that her hallucinations are part of her overall viral infection picture  Due to dizziness associated with her hallucinations, will start patient on normal saline bolus 1 L and then maintenance 100 mL/hour due to mild hypotension which may be contributing to her symptoms  Asymptomatic bacteriuria  Assessment & Plan  Urine culture growing [id number] group B strep  Urinalysis only shows 2-4 WBC and patient is asymptomatic  Continue to monitor off antibiotics  VTE Pharmacologic Prophylaxis:   Pharmacologic: Heparin  Mechanical VTE Prophylaxis in Place: No    Patient Centered Rounds: I have performed bedside rounds with nursing staff today  Discussions with Specialists or Other Care Team Provider:  Yes    Education and Discussions with Family / Patient:  Yes    Time Spent for Care: 15 minutes    More than 50% of total time spent on counseling and coordination of care as described above  Current Length of Stay: 2 day(s)    Current Patient Status: Inpatient   Certification Statement: The patient will continue to require additional inpatient hospital stay due to Viral pneumonia    Discharge Plan: To be determined    Code Status: Level 1 - Full Code      Subjective:   Patient states her shortness of breath is much better    Objective:     Vitals:   Temp (24hrs), Av °F (36 7 °C), Min:97 5 °F (36 4 °C), Max:98 5 °F (36 9 °C)    Temp:  [97 5 °F (36 4 °C)-98 5 °F (36 9 °C)] 98 5 °F (36 9 °C)  HR:  [67-78] 70  Resp:  [14-19] 14  BP: (100-122)/(50-59) 103/54  SpO2:  [94 %-98 %] 98 %  Body mass index is 26 61 kg/m²  Input and Output Summary (last 24 hours): Intake/Output Summary (Last 24 hours) at 2020 1744  Last data filed at 2020 0830  Gross per 24 hour   Intake 250 ml   Output    Net 250 ml       Physical Exam:     Physical Exam   Constitutional: She is oriented to person, place, and time  She appears well-developed and well-nourished  No distress  HENT:   Head: Normocephalic and atraumatic  Eyes: Pupils are equal, round, and reactive to light  EOM are normal    Neck: Normal range of motion  Neck supple  Cardiovascular: Normal rate, regular rhythm and normal heart sounds  Abdominal: Soft  Bowel sounds are normal    Musculoskeletal: She exhibits no edema  Neurological: She is alert and oriented to person, place, and time  Skin: Skin is warm and dry  She is not diaphoretic           Additional Data:     Labs:    Results from last 7 days   Lab Units 20  0554   WBC Thousand/uL 11 12*   HEMOGLOBIN g/dL 11 1*   HEMATOCRIT % 32 6*   PLATELETS Thousands/uL 396*   NEUTROS PCT % 83*   LYMPHS PCT % 10*   MONOS PCT % 6   EOS PCT % 0     Results from last 7 days   Lab Units 20  0554 20  0452   SODIUM mmol/L 130* 131*   POTASSIUM mmol/L 3 4* 3 1*   CHLORIDE mmol/L 94* 93*   CO2 mmol/L 24 27   BUN mg/dL 16 12   CREATININE mg/dL 1 13 1 13   ANION GAP mmol/L 12 11   CALCIUM mg/dL 9 7 9 6   ALBUMIN g/dL  --  3 0*   TOTAL BILIRUBIN mg/dL  --  0 60   ALK PHOS U/L  --  106   ALT U/L  --  64   AST U/L  --  58*   GLUCOSE RANDOM mg/dL 145* 108                 Results from last 7 days   Lab Units 04/09/20  0554 04/08/20  0452   PROCALCITONIN ng/ml 0 70* 0 99*           * I Have Reviewed All Lab Data Listed Above  * Additional Pertinent Lab Tests Reviewed: [place] [address] Admission Reviewed    Imaging:    Imaging Reports Reviewed Today Include:  Unavailable  Imaging Personally Reviewed by Myself Includes:  None    Recent Cultures (last 7 days):     Results from last 7 days   Lab Units 04/07/20  1933   URINE CULTURE  40,000-49,000 cfu/ml Beta Hemolytic Streptococcus Group B*       Last 24 Hours Medication List:     Current Facility-Administered Medications:  acetaminophen 650 mg Oral Q6H PRN TEDDY Bragg   ascorbic acid 1,000 mg Oral BID Viet Kennedy MD   atorvastatin 40 mg Oral Daily With Ramandeep Josue MD   azithromycin 250 mg Oral Q24H Viet Kennedy MD   heparin (porcine) 5,000 Units Subcutaneous Q8H [facility] MD   hydroxychloroquine 200 mg Oral BID Viet Kennedy MD   melatonin 3 mg Oral HS TEDDY Bragg   ondansetron 4 mg Intravenous Q6H PRN Viet Kennedy MD   potassium chloride 40 mEq Oral Once Viet Kennedy MD   sodium chloride 1,000 mL Intravenous Once Viet Kennedy MD   sodium chloride 100 mL/hr Intravenous Continuous Viet Kennedy MD   zinc sulfate 220 mg Oral Daily Viet Kennedy MD        Today, Patient Was Seen By: Viet Kennedy MD    ** Please Note: Dictation voice to text software may have been used in the creation of this document   **

## 2020-04-09 NOTE — CONSULTS
[facility]   Neurology Initial Consult    Alberta Shelby is a 60 y o  female  [address] [facility] 22-*    Information obtained from:   Chief Complaint   Patient presents with    Hallucinations     Pt states that she has been seeing shadows on the walls since this AM         Assessment/Plan:    1  Viral illness/COVID-19 positive  2  Possible UTI  3  Visual hallucination  4  Ongoing diarrhea  5  Hyponatremia on admission    -remains on telemetry  -COVID treatment per PCP/ID  -PCP to evaluate and manage UTI  Patient was onset of visual hallucinations in the midst of active COVID-19 viral infection  Patient has had onset of diarrhea throughout the week, notes decreased oral intake has been using Pedialyte and Gatorade for hydration  Patient without any focal neurological deficits, suspect possible viral etiology for intermittent dizziness and visual hallucinations  Will defer on any further diagnostics or LP at this time pending any changes in patient neurological status, temperature is, visual disturbances or increased and diagnostic/lab findings  HPI:  Adriana Kyle is a 17-year-old female with recent medical presentation of positive COVID-19 virus  Patient in hospital approximately 1 week ago with reports of dizziness, chills and muscle aches and weakness  At that time patient had no focal deficits and MRI was negative for any ischemic events  Patient did spike a temperature at that time was tested to be found positive for the COVID-19 virus  Patient discharged to home on supportive care in quarantine  Patient reports during that time she was lethargic, had onset of diarrhea and was malaised  Patient reports having onset of visual hallucinations on Tuesday 04/07/2020 and noted having continued dizziness intermittently with positional activities including standing and attempting to ambulate across the room    Patient had followed up with her PCP regarding her symptoms multiple times through the week  After 5 days of symptomatology  PCP recommended patient come to the ER for further treatment  Currently patient reports feeling better, is sitting up eating  Notes having no dizziness at this time but indicates ongoing diarrhea  Patient also has a cough that is worsened with deep breathing and activity  Patient denies having any headache, vertigo, lightheadedness, chest pain, abdominal pain, nausea or vomiting  Patient denies visual changes such as blurred vision or double vision and denies photophobia  Patient is currently afebrile  Upon exam patient has no focal weaknesses or neurological changes, has good peripheral vision and acuity  Notes occasionally having hallucinatory visions of circling dark shadows in front of her  Patient eyes are conjugate, equal gaze in all fields with no noted nystagmus  Meningeal testing completed, however patient negative for any neck stiffness or increased pain  Does note with exertion her visual hallucinations are present  Noted to have slight elevation in her WBC count this a m  However did receive a dose of IV Solu-Medrol on 04/08/2020  CRP levels significantly elevated however are trending downward today with the finding of 180 3  Urine culture returned with positive beta-hemolytic strep group B, PCP managing  Chest x-ray indicative of ground-glass opacities L>R indicative of possible viral pneumonia currently being treated by PCP/ID    Sodium on admission 128 currently 130    Past Medical History:   Diagnosis Date    H/O antinuclear antibodies     [facility] 3[facility]    Urinary tract infection     last assessed: 10/24/2013       Past Surgical History:   Procedure Laterality Date    APPENDECTOMY         No Known Allergies      Current Facility-Administered Medications:     acetaminophen (TYLENOL) tablet 650 mg, 650 mg, Oral, Q6H PRN, TEDDY Lee, 650 mg at 04/09/20 1252   ascorbic acid (VITAMIN C) tablet 1,000 mg, 1,000 mg, Oral, BID, Adriana Liriano MD, 1,000 mg at 04/09/20 0941    atorvastatin (LIPITOR) tablet 40 mg, 40 mg, Oral, Daily With Debra Thurman MD, 40 mg at 04/08/20 1701    azithromycin (ZITHROMAX) tablet 250 mg, 250 mg, Oral, Q24H, Adriana Liriano MD, 250 mg at 04/08/20 1332    heparin (porcine) subcutaneous injection 5,000 Units, 5,000 Units, Subcutaneous, Q8H [address], 5,000 Units at 04/09/20 0625 **AND** [COMPLETED] Platelet count, , , Once, Reinaldo Cunningham MD    hydroxychloroquine (PLAQUENIL) tablet 200 mg, 200 mg, Oral, BID, Adriana Liriano MD, 200 mg at 04/09/20 0941    melatonin tablet 3 mg, 3 mg, Oral, HS, Mittie Cheese, CRNP, 3 mg at 04/08/20 2118    ondansetron (ZOFRAN) injection 4 mg, 4 mg, Intravenous, Q6H PRN, Reinaldo Cunningham MD    zinc sulfate (ZINCATE) capsule 220 mg, 220 mg, Oral, Daily, Reinaldo Cunningham MD, 220 mg at 04/09/20 0941    Social History     Socioeconomic History    Marital status: Single     Spouse name: Not on file    Number of children: Not on file    Years of education: Not on file    Highest education level: Not on file   Occupational History    Not on file   Social Needs    Financial resource strain: Not on file    Food insecurity:     Worry: Not on file     Inability: Not on file    Transportation needs:     Medical: Not on file     Non-medical: Not on file   Tobacco Use    Smoking status: Never Smoker    Smokeless tobacco: Never Used   Substance and Sexual Activity    Alcohol use: Not Currently     Frequency: Never     Binge frequency: Never    Drug use: Not Currently    Sexual activity: Not Currently     Partners: Male   Lifestyle    Physical activity:     Days per week: Not on file     Minutes per session: Not on file    Stress: Not on file   Relationships    Social connections:     Talks on phone: Not on file     Gets together: Not on file     Attends Yazidi service: Not on file     Active member of club or organization: Not on file     Attends meetings of clubs or organizations: Not on file     Relationship status: Not on file    Intimate partner violence:     Fear of current or ex partner: Not on file     Emotionally abused: Not on file     Physically abused: Not on file     Forced sexual activity: Not on file   Other Topics Concern    Not on file   Social History Narrative    Not on file       Family History   Problem Relation Age of Onset    Diabetes Mother          Review of systems:  Please see HPI for positive symptoms  No fever, no chills, no weight change  Ocular: No diplopia, no blurred vision, positive visual hallucination of shadowy swirls   HEENT:  No sore throat or congestion  No neck pain  COR:  No chest pain  No palpitations  Lungs:  + cough, shortness of breath  GI:  no N/V, + diarrhea  :  No dysuria, frequency, or urgency  No hematuria  Musculoskeletal:  No joint pain or swelling  Skin:  No rash or itching  Psychiatric:  no anxiety, no depression  + visual hallucination  Endocrine:  No polyuria or polydipsia  Physical examination:  Vitals:    04/09/20 0730   BP: 106/54   Pulse: 67   Resp: 19   Temp: 97 5 °F (36 4 °C)   SpO2: 96%       GENERAL APPEARANCE:  The patient is alert, oriented  He is in no acute distress  HEENT:  Head is normocephalic  The sinuses are nontender  Pupils are equal and reactive  NECK:  Supple without lymphadenopathy  HEART:  Regular rate and rhythm  LUNGS:  clear to auscultation  No crackles or wheezes are heard  ABDOMEN:  Soft, nontender, nondistended with good bowel sounds heard  EXTREMITIES:  Without cyanosis, clubbing or edema  Mental status: The patient is alert, attentive, and oriented  Speech is clear and fluent, good repetition, comprehension, and naming  Cranial nerves:  CN II: Visual fields are full to confrontation  Fundoscopic exam is normal with sharp discs and no vascular changes    Pupils are 3 mm and briskly reactive to light  CN III, IV, VI: At primary gaze, there is no eye deviation, no evidence of nystagmus  CN V: Facial sensation is intact to pinprick in all 3 divisions bilaterally  Corneal responses are intact  CN VII: Face is symmetric with normal eye closure and smile  CN VIII: Hearing is normal to rubbing fingers  CN IX, X: Palate elevates symmetrically  Phonation is normal   CN XI: Head turning and shoulder shrug are intact  CN XII: Tongue is midline with normal movements and no atrophy  Motor: There is no pronator drift of out-stretched arms  Muscle bulk and tone are normal      Muscle exam  Arm Right Left Leg Right Left   Deltoid 5/5 5/5 Iliopsoas 5/5 5/5   Biceps 5/5 5/5 Quads 5/5 5/5   Triceps 5/5 5/5 Hamstrings 5/5 5/5   Wrist Extension 5/5 5/5 Ankle Dorsi Flexion 5/5 5/5   Wrist Flexion 5/5 5/5 Ankle Plantar Flexion 5/5 5/5   Interossei 5/5 5/5 Ankle Eversion     APB 5/5 5/5 Ankle Inversion       Meningeal testing:   Negative Brudzinski, negative Kernig's  Reflexes   RJ BJ TJ KJ AJ Plantars Garcia's   Right 2+ 2+ 2+ 2+ 2+ Downgoing Not present   Left 2+ 2+ 2+ 2+ 2+ Downgoing Not present     Sensory:  Light touch, pinprick, position sense, and vibration sense are intact in fingers and toes  Coordination:  Rapid alternating movements and fine finger movements are intact  There is no dysmetria on finger-to-nose and heel-knee-shin  There are no abnormal or extraneous movements  Romberg deferred due to patient attachments in bed, fall risk  Gait/Stance:  Deferred due to patient attachments in bed, fall risk      Lab Results   Component Value Date    WBC 11 12 (H) 04/09/2020    HGB 11 1 (L) 04/09/2020    HCT 32 6 (L) 04/09/2020    MCV 79 (L) 04/09/2020     (H) 04/09/2020     Lab Results   Component Value Date    HGBA1C 5 8 (H) 04/01/2020     Lab Results   Component Value Date    ALT 64 04/08/2020    AST 58 (H) 04/08/2020    ALKPHOS 106 04/08/2020     Lab Results   Component Value Date    CALCIUM 9 7 04/09/2020    K 3 4 (L) 04/09/2020    CO2 24 04/09/2020    CL 94 (L) 04/09/2020    BUN 16 04/09/2020    CREATININE 1 13 04/09/2020   Sodium 130   3  Procalcitonin 0 7    Review of reports and notes reveal:    Independent Interpretation of images or specimens:  Xr Chest 1 View Portable Result Date: 4/7/2020  Subtle peripheral right greater than left ground glass opacity, compatible with viral pneumonia  The study was marked in [facility] for immediate notification  Workstation performed: [id number]     Ct Head Without Contrast Result Date: 4/7/2020  No acute intracranial hemorrhage No mass effect or midline shift   Workstation performed: [id number]           Thank you for this consult  Total time of encounter: 70  More than 50% of time was spent in counseling and coordination of care of patient

## 2020-04-09 NOTE — PROGRESS NOTES
Progress Note - Infectious Disease   Lilli [facility] 60 y o  female MRN: [id number]  Unit/Bed#: [address] 365[address] Encounter: [id number]      Impression/Plan:    60-year-old female patient admitted 04/07/2020 for fatigue, visual hallucinations, dizziness after being tested positive for COVID-19     1- COVID 19 pneumonia:  Patient is currently afebrile, no hypoxia;  Elevated inflammatory markers noted  Patient is on day 3 of hydroxy chloroquine and azithromycin today  - continue hydroxychloroquine and azithromycin for 5 days course through 04/11/2020  - monitor QTc interval closely and monitor glycemia daily while on hydroxychloroquine  - monitor respiratory status closely  - no current evidence of superimposed bacterial pneumonia, stop ceftriaxone and monitor clinically off antibiotics     2- dizziness and visual hallucinations:  Patient reports visual hallucinations described as lights and shadows that started 1 week prior to presentation  Unclear if the symptoms are secondary to COVID -19 infection; concern for encephalitis  MRI done 03/31/2020 reviewed  No signs of abnormalities noted  -  neurology follow-up  - continue treatment for COVID-19 infection as mentioned above  - close clinical monitoring of mental status  - frequent neuro checks     3- hyponatremia:  Sodium of 128 on admission, currently sodium 130  - monitor BMP  - rest of management as per primary service     4- asymptomatic bacteriuria:  Patient denies dysuria or polyuria  UA with only 2-4 wbc  Urine culture with 40-49 K group B strep  - no indication for antibiotic therapy at this time    Plan mentioned above discussed with patient  Case discussed with primary service          Subjective:  Patient has no fever, chills, sweats; no nausea, vomiting, diarrhea; no cough, shortness of breath; no pain  No new symptoms    She still reports some visual hallucinations, though reports these are subsiding    Objective:  Vitals:  Temp:  [97 5 °F (36 4 °C)-98 4 °F (36 9 °C)] 98 4 °F (36 9 °C)  HR:  [67-78] 78  Resp:  [14-19] 14  BP: (100-122)/(50-59) 100/50  SpO2:  [94 %-98 %] 96 %  Temp (24hrs), Av 9 °F (36 6 °C), Min:97 5 °F (36 4 °C), Max:98 4 °F (36 9 °C)  Current: Temperature: 98 4 °F (36 9 °C)    Physical Exam:   Spoke with the patient over the phone  Parts of Physical exam noted below was performed by nursing staff and primary service physician and discussed with me due to current COVID crisis, and to limit exposure  General Appearance:  Alert, interactive, nontoxic, no acute distress  Throat: Oropharynx moist without lesions  Lungs:   Clear to auscultation bilaterally; no wheezes, rhonchi or rales; respirations unlabored   Heart:  RRR; no murmur, rub or gallop   Abdomen:   Soft, non-tender, non-distended, positive bowel sounds  Extremities: No clubbing, cyanosis or edema   Skin: No new rashes or lesions  No draining wounds noted         Labs, Imaging, & Other studies:   All pertinent labs and imaging studies were personally reviewed  Results from last 7 days   Lab Units 20  0554 20  1243   WBC Thousand/uL 11 12* 9 08  --  9 05   HEMOGLOBIN g/dL 11 1* 11 7  --  12 0   PLATELETS Thousands/uL 396* 339 316 270     Results from last 7 days   Lab Units 20  0554 20  04520  1243   SODIUM mmol/L 130* 131* 128*   POTASSIUM mmol/L 3 4* 3 1* 3 4*   CHLORIDE mmol/L 94* 93* 89*   CO2 mmol/L 24 27 26   BUN mg/dL 16 12 13   CREATININE mg/dL 1 13 1 13 1 22   EGFR ml/min/1 73sq m 60 60 55   CALCIUM mg/dL 9 7 9 6 9 0   AST U/L  --  58* 94*   ALT U/L  --  64 81*   ALK PHOS U/L  --  106 118*     Results from last 7 days   Lab Units 20  1933   URINE CULTURE  40,000-49,000 cfu/ml Beta Hemolytic Streptococcus Group B*     Results from last 7 days   Lab Units 20  0554 20  0452   PROCALCITONIN ng/ml 0 70* 0 99*

## 2020-04-09 NOTE — ASSESSMENT & PLAN NOTE
Patient presented complaining of visual hallucinations with fatigue and dizziness as part of her symptoms of COVID-19 viral infection  Symptoms had been present for 1 week prior to her presentation  MRI of brain performed on 03/31/2020 due to concern for encephalitis was negative  Neurology consult placed-and no further neurologic interventional recommendations as it is felt that her hallucinations are part of her overall viral infection picture  Due to dizziness associated with her hallucinations, will start patient on normal saline bolus 1 L and then maintenance 100 mL/hour due to mild hypotension which may be contributing to her symptoms

## 2020-04-10 VITALS
HEIGHT: 68 IN | OXYGEN SATURATION: 96 % | HEART RATE: 67 BPM | RESPIRATION RATE: 20 BRPM | DIASTOLIC BLOOD PRESSURE: 54 MMHG | TEMPERATURE: 99 F | SYSTOLIC BLOOD PRESSURE: 98 MMHG | WEIGHT: 175 LBS | BODY MASS INDEX: 26.52 KG/M2

## 2020-04-10 LAB
ANION GAP SERPL CALCULATED.3IONS-SCNC: 12 MMOL/L (ref 4–13)
BUN SERPL-MCNC: 17 MG/DL (ref 5–25)
CALCIUM SERPL-MCNC: 8.6 MG/DL (ref 8.3–10.1)
CHLORIDE SERPL-SCNC: 99 MMOL/L (ref 100–108)
CO2 SERPL-SCNC: 22 MMOL/L (ref 21–32)
CREAT SERPL-MCNC: 1.09 MG/DL (ref 0.6–1.3)
CRP SERPL QL: 92.3 MG/L
FERRITIN SERPL-MCNC: 971 NG/ML (ref 8–388)
GFR SERPL CREATININE-BSD FRML MDRD: 63 ML/MIN/1.73SQ M
GLUCOSE SERPL-MCNC: 111 MG/DL (ref 65–140)
POTASSIUM SERPL-SCNC: 3.5 MMOL/L (ref 3.5–5.3)
SODIUM SERPL-SCNC: 133 MMOL/L (ref 136–145)

## 2020-04-10 PROCEDURE — 80048 BASIC METABOLIC PNL TOTAL CA: CPT | Performed by: FAMILY MEDICINE

## 2020-04-10 PROCEDURE — 99232 SBSQ HOSP IP/OBS MODERATE 35: CPT | Performed by: NURSE PRACTITIONER

## 2020-04-10 PROCEDURE — 86140 C-REACTIVE PROTEIN: CPT | Performed by: FAMILY MEDICINE

## 2020-04-10 PROCEDURE — 99233 SBSQ HOSP IP/OBS HIGH 50: CPT | Performed by: INTERNAL MEDICINE

## 2020-04-10 PROCEDURE — 82728 ASSAY OF FERRITIN: CPT | Performed by: FAMILY MEDICINE

## 2020-04-10 PROCEDURE — 99238 HOSP IP/OBS DSCHRG MGMT 30/<: CPT | Performed by: FAMILY MEDICINE

## 2020-04-10 RX ORDER — AZITHROMYCIN 250 MG/1
250 TABLET, FILM COATED ORAL EVERY 24 HOURS
Qty: 2 TABLET | Refills: 0 | Status: SHIPPED | OUTPATIENT
Start: 2020-04-10 | End: 2020-04-12

## 2020-04-10 RX ORDER — HYDROXYCHLOROQUINE SULFATE 200 MG/1
200 TABLET, FILM COATED ORAL 2 TIMES DAILY
Qty: 4 TABLET | Refills: 0 | Status: SHIPPED | OUTPATIENT
Start: 2020-04-10 | End: 2020-04-14 | Stop reason: ALTCHOICE

## 2020-04-10 RX ORDER — ZINC SULFATE 50(220)MG
220 CAPSULE ORAL DAILY
Qty: 2 CAPSULE | Refills: 0 | Status: SHIPPED | OUTPATIENT
Start: 2020-04-11 | End: 2022-05-06 | Stop reason: ALTCHOICE

## 2020-04-10 RX ORDER — ATORVASTATIN CALCIUM 40 MG/1
40 TABLET, FILM COATED ORAL
Qty: 2 TABLET | Refills: 0 | Status: SHIPPED | OUTPATIENT
Start: 2020-04-10 | End: 2020-04-14 | Stop reason: ALTCHOICE

## 2020-04-10 RX ADMIN — ACETAMINOPHEN 650 MG: 325 TABLET ORAL at 09:57

## 2020-04-10 RX ADMIN — OXYCODONE HYDROCHLORIDE AND ACETAMINOPHEN 1000 MG: 500 TABLET ORAL at 17:03

## 2020-04-10 RX ADMIN — HEPARIN SODIUM 5000 UNITS: 5000 INJECTION, SOLUTION INTRAVENOUS; SUBCUTANEOUS at 06:31

## 2020-04-10 RX ADMIN — HYDROXYCHLOROQUINE SULFATE 200 MG: 200 TABLET, FILM COATED ORAL at 09:57

## 2020-04-10 RX ADMIN — ATORVASTATIN CALCIUM 40 MG: 40 TABLET, FILM COATED ORAL at 17:06

## 2020-04-10 RX ADMIN — HYDROXYCHLOROQUINE SULFATE 200 MG: 200 TABLET, FILM COATED ORAL at 17:03

## 2020-04-10 RX ADMIN — OXYCODONE HYDROCHLORIDE AND ACETAMINOPHEN 1000 MG: 500 TABLET ORAL at 09:57

## 2020-04-10 RX ADMIN — HEPARIN SODIUM 5000 UNITS: 5000 INJECTION, SOLUTION INTRAVENOUS; SUBCUTANEOUS at 14:57

## 2020-04-10 RX ADMIN — AZITHROMYCIN 250 MG: 250 TABLET, FILM COATED ORAL at 14:57

## 2020-04-10 RX ADMIN — ZINC SULFATE 220 MG (50 MG) CAPSULE 220 MG: CAPSULE at 09:58

## 2020-04-10 NOTE — PROGRESS NOTES
Progress Note - Infectious Disease   Trevin Vasquez 60 y o  female MRN: [id number]  Unit/Bed#: [address]30 Encounter: [id number]      Impression/Plan:    74-year-old female patient admitted 04/07/2020 for fatigue, visual hallucinations, dizziness after being tested positive for COVID-19     1- COVID 19 pneumonia:  Patient is currently afebrile, no hypoxia; she is currently on room air  Elevated inflammatory markers noted, but both CRP and Ferritin trending down  Patient is on day 4 of hydroxy chloroquine and azithromycin today  - continue hydroxychloroquine and azithromycin for 5 days course through 04/11/2020  - monitor QTc interval closely and monitor glycemia daily while on hydroxychloroquine  - monitor respiratory status closely  - no current evidence of superimposed bacterial pneumonia, continue to  monitor clinically off antibiotics     2- visual hallucinations:  Patient reports visual hallucinations described as lights and shadows that started 1 week prior to presentation  Unclear if the symptoms are secondary to COVID -19 infection; concern for encephalitis    MRI done 03/31/2020 reviewed   No signs of abnormalities noted; neurology note reviewed  -  neurology follow-up  - continue treatment for COVID-19 infection as mentioned above  - close clinical monitoring of mental status  - frequent neuro checks     3- hyponatremia:  Sodium of 128 on admission, currently sodium 133  - monitor BMP  - rest of management as per primary service     4- asymptomatic bacteriuria:  Patient denies dysuria or polyuria  UA with only 2-4 wbc  Urine culture with 40-49 K group B strep  - no indication for antibiotic therapy at this time     Plan mentioned above discussed with patient  Case discussed with primary service  At discharge, patient should be advised to continue self isolation until afebrile for 72 hours, without use of Tylenol or other antipyretics, improvement in respiratory symptoms,  and until more than 7 days have passed since onset of her symptoms      Antimicrobials:  Hydroxychloroquine day 4  Azithromycin day 4    Subjective:  Patient has no fever, chills, sweats; no nausea, vomiting, diarrhea; no cough, shortness of breath; still reporting visual hallucinations but she says it does not bother her  She reports mild dyspnea on exertion  when she walked to the bathroom    Objective:  Vitals:  Temp:  [98 1 °F (36 7 °C)-99 3 °F (37 4 °C)] 99 3 °F (37 4 °C)  HR:  [70-82] 82  Resp:  [14-18] 18  BP: (100-122)/(50-59) 116/59  SpO2:  [93 %-98 %] 95 %  Temp (24hrs), Av 6 °F (37 °C), Min:98 1 °F (36 7 °C), Max:99 3 °F (37 4 °C)  Current: Temperature: 99 3 °F (37 4 °C)    Physical Exam:   I spoke with the patient through the phone  Parts of below mentioned exam performed by primary physician and nurse and discussed with me due to current  COVID crisis and to limit exposure  General Appearance:  Alert, interactive, nontoxic, no acute distress  Throat: Oropharynx moist without lesions  Lungs:   Clear to auscultation bilaterally; no wheezes, rhonchi or rales; respirations unlabored   Heart:  RRR; no murmur, rub or gallop   Abdomen:   Soft, non-tender, non-distended, positive bowel sounds  Extremities: No clubbing, cyanosis or edema   Skin: No new rashes or lesions  No draining wounds noted         Labs, Imaging, & Other studies:   All pertinent labs and imaging studies were personally reviewed  Results from last 7 days   Lab Units 20  0554 20  0452 20  1927 20  1243   WBC Thousand/uL 11 12* 9 08  --  9 05   HEMOGLOBIN g/dL 11 1* 11 7  --  12 0   PLATELETS Thousands/uL 396* 339 316 270     Results from last 7 days   Lab Units 04/10/20  0630 20  0554 20  0452 20  1243   SODIUM mmol/L 133* 130* 131* 128*   POTASSIUM mmol/L 3 5 3 4* 3 1* 3 4*   CHLORIDE mmol/L 99* 94* 93* 89*   CO2 mmol/L 22 24 27 26   BUN mg/dL 17 16 12 13   CREATININE mg/dL 1 09 1 13 1 13 1 22   EGFR ml/min/1 73sq m 63 60 60 55   CALCIUM mg/dL 8 6 9 7 9 6 9 0   AST U/L  --   --  58* 94*   ALT U/L  --   --  64 81*   ALK PHOS U/L  --   --  106 118*     Results from last 7 days   Lab Units 04/07/20  1933   URINE CULTURE  40,000-49,000 cfu/ml Beta Hemolytic Streptococcus Group B*     Results from last 7 days   Lab Units 04/09/20  0554 04/08/20  0452   PROCALCITONIN ng/ml 0 70* 0 99*

## 2020-04-10 NOTE — ASSESSMENT & PLAN NOTE
Patient presented complaining of visual hallucinations with fatigue and dizziness as part of her symptoms of COVID-19 viral infection  Symptoms had been present for 1 week prior to her presentation  MRI of brain performed on 03/31/2020 due to concern for encephalitis was negative  Neurology consult placed-and no further neurologic interventional recommendations as it is felt that her hallucinations are part of her overall viral infection picture  Due to patient's hallucinations associated with dizziness and visual disturbances, neurology recommends outpatient ophthalmology follow-up for the visual disturbances and also follow-up with neurology in 6 weeks  Neurology will consider repeat MRI of brain with optical nerve evaluation if the visual disturbances do not improve with resolution of the viral illness

## 2020-04-10 NOTE — NURSING NOTE
Discharge instructions discussed with patient, all questions answered  Patient left 3S stable, all personal belongings at side

## 2020-04-10 NOTE — PLAN OF CARE
Problem: RESPIRATORY - ADULT  Goal: Achieves optimal ventilation and oxygenation  Description  INTERVENTIONS:  - Assess for changes in respiratory status  - Assess for changes in mentation and behavior  - Position to facilitate oxygenation and minimize respiratory effort  - Oxygen administered by appropriate delivery if ordered  - Initiate smoking cessation education as indicated  - Encourage broncho-pulmonary hygiene including cough, deep breathe, Incentive Spirometry  - Assess the need for suctioning and aspirate as needed  - Assess and instruct to report SOB or any respiratory difficulty  - Respiratory Therapy support as indicated  4/10/2020 1646 by Marietta Claire RN  Outcome: Completed  4/10/2020 1645 by Marietta Claire RN  Outcome: Progressing

## 2020-04-10 NOTE — ASSESSMENT & PLAN NOTE
Urine culture growing [id number] group B strep  Urinalysis only shows 2-4 WBC and patient is asymptomatic  Continue to monitor off antibiotics

## 2020-04-10 NOTE — ASSESSMENT & PLAN NOTE
Patient presented with visual hallucinations after suffering from COVID-19 symptoms since 04/01/2020 when she presented to her PCP and was tested  She presented to the ED  since her test results came back positive  She is currently on contact/airborne/droplet isolation  Continue therapy with hydroxychloroquine 200 mg PO Q 12h x4 days to end on Saturday 4/11  Continue azithromycin 250 mg PO daily x 4 days to end on Saturday 4/11  Baseline EKG on 04/07 showed QTc 438ms  Last EKG on 03/31/2020 showed QTc 442ms  Continue telemetry to monitor QTC interval daily and also monitor glycemia while on hydroxychloroquine  Inflammatory markers:  Procalcitonin trend 0 99-->0 70, ferritin trend 1355--> 1322--> 971, C-reactive protein trend 217 7-->239 9-->180 3--> 92 3  Patient is improving significantly and is currently oxygenating well on room air  Patient is currently hemodynamically stable for discharge to home today  She has to self isolate for 5 days from when she was symptomatic

## 2020-04-10 NOTE — PROGRESS NOTES
Neurology Consult Follow Up      Vijay Monaco is a 60 y o  female  [address] [place] [address]    [id number]        Assessment/Recommendations:    Assessment/Plan:     1  Viral illness/COVID-19 positive  2  Visual hallucination  3  Ongoing diarrhea-denies any diarrhea as of this a m   4  Possible viral pneumonia  5  Asymptomatic UTI  5  Hyponatremia on admission-currently 133     -remains on telemetry  -COVID treatment per PCP/ID  -recommend outpatient ophthalmology follow-up regarding vision changes  -follow-up in outpatient Neurology in 6 weeks  -consider repeat MRI of the brain with optical nerve evaluation if visual changes do not resolve with resolution of COVID/viral illness  Patient with COVID positive findings for greater than 1 week  with additional acute illnesses including viral pneumonia with asymptomatic UTI a beta-hemolytic strep  Patient remains on Plaquenil and Zithromax per PCP/ID recommendations  Today patient has less of the spontaneous cough and notes shortness of breath only when exerting herself  Patient also had reports of dizziness, notes having some slight dizziness after exerting herself with walking, patient notes shortness of breath at the same time  Patient also indicating ongoing visual change with the visualization of shadow figures that are circling any fan like motion  This appears to be centrally located and ongoing  Indicating mostly visible on plank surface, however when looking up to her TV this object disappears  Inpatient fundal exam without any abnormal findings however patient would benefit from Ophthalmology evaluating her with dilation for more in-depth fundal/retinal exam   Patient did receive IV fluids overnight, systolic blood pressure has improved however patient continues to have some slight dizziness after exertion      Suspect symptoms are related to her current viral illness, recommend continued supportive treatment of COVID virus with medications per medical team and Infectious Disease  Once patient recovers from [place] prior assess, would recommend ophthalmology exam for further evaluation of visual changes  Would also recommend repeat MRI of the brain with optical nerve evaluation if visual hallucinations should continue post viral illness  Would like to follow-up patient x1 in our outpatient Neurology office to evaluate this need  LP deferred at this time, patient has active viral illness currently under treatment with no significant leukocytosis or focal/central neurological symptomatology  CRP/ferritin is trending down would prefer to defer on LP at this time  Will reconsider if patient has any changes in symptomatology with spikes in temp or changes in diagnostic findings  Chief Complaint:    Subjective:   Patient reports feeling tired this morning  Reports having had no diarrhea this morning  She is able to take a deep breath without cough  Patient does report shortness of breath with exertion and indicates some slight dizziness after ambulating a distance  Patient reports she is able to get up and walk to the bathroom however when she is in the bathroom she does notice a little bit of dizziness  Patient reports that her vision is clear without double vision or blurry vision, however continues to have this visual citing was of circular shadows which she describes as a fan appearance  Patient reports she sees is primarily when she is looking at blank space like the wall in front of her  This appears in both eyes and is relatively constant  Patient denies any eye pain, double vision, blurred vision  Reports her peripheral vision is intact, this appears central in location, patient reports when looking up at the TV [place] disappear but notices them more on blank surface  Patient did report having a slight headache this morning across frontal region, as she was medicated which resolved her headache    Denied any photophobia, blurred vision double vision with this headache  Denies any significant history of headaches  Past Medical History:   Diagnosis Date    H/O antinuclear antibodies     [facility]    Urinary tract infection     last assessed: 10/24/2013     Social History     Socioeconomic History    Marital status: Single     Spouse name: Not on file    Number of children: Not on file    Years of education: Not on file    Highest education level: Not on file   Occupational History    Not on file   Social Needs    Financial resource strain: Not on file    Food insecurity:     Worry: Not on file     Inability: Not on file    Transportation needs:     Medical: Not on file     Non-medical: Not on file   Tobacco Use    Smoking status: Never Smoker    Smokeless tobacco: Never Used   Substance and Sexual Activity    Alcohol use: Not Currently     Frequency: Never     Binge frequency: Never    Drug use: Not Currently    Sexual activity: Not Currently     Partners: Male   Lifestyle    Physical activity:     Days per week: Not on file     Minutes per session: Not on file    Stress: Not on file   Relationships    Social connections:     Talks on phone: Not on file     Gets together: Not on file     Attends Oriental orthodox service: Not on file     Active member of club or organization: Not on file     Attends meetings of clubs or organizations: Not on file     Relationship status: Not on file    Intimate partner violence:     Fear of current or ex partner: Not on file     Emotionally abused: Not on file     Physically abused: Not on file     Forced sexual activity: Not on file   Other Topics Concern    Not on file   Social History Narrative    Not on file     Family History   Problem Relation Age of Onset    Diabetes Mother        ROS:  Please see HPI for positive symptoms  No fever, no chills, no weight change  Ocular: No double, no blurred vision   + visual hallucinations seen  HEENT:  No sore throat or congestion  No neck pain  Positive frontal pressure (now resolved )  COR:  No chest pain  No palpitations  Lungs: + shortness of breath with exertion  GI:  no  nausea, no vomiting, no diarrhea,  no anorexia  :  No dysuria, frequency, or urgency  No hematuria  Musculoskeletal:  No joint pain or swelling  Skin:  No rash or itching  Psychiatric:  no anxiety, no depression  Endocrine:  No polyuria or polydipsia  Objective:  /59 (BP Location: Left arm)   Pulse 82   Temp 99 3 °F (37 4 °C) (Oral)   Resp 18   Ht 5' 8" (1 727 m)   Wt 79 4 kg (175 lb)   SpO2 95%   BMI 26 61 kg/m²     General: alert   Mental status: oriented x3  Attention: normal  Knowledge: fair  Language and Speech: normal  Cranial nerves:  CN II: Visual fields are full to confrontation  Pupils are 3 mm and briskly reactive to light  CN III, IV, VI: At primary gaze, there is no eye deviation, no evidence of nystagmus  CN V: Facial sensation is intact to pinprick in all 3 divisions bilaterally  Corneal responses are intact  CN VII: Face is symmetric with normal eye closure and smile  CN VIII: Hearing is normal to rubbing fingers  CN IX, X: Palate elevates symmetrically  Phonation is normal   CN XI: Head turning and shoulder shrug are intact  CN XII: Tongue is midline with normal movements and no atrophy  Muscle tone: normal  Motor strength:  5/5 throughout  Sensory: normal to light touch, temperature, vibration  Proprioception intact  Gait: unsteady  Coordination: finger to nose and heel to toe normal  Reflexes: 2+ throughout   HEENT:  Eyes glassy, pupils equal reactive to light bilateral  Heart:  Rate and rhythm regular  Lungs:  Breath sounds are diminished but clear to auscultation  Abdomen:  Soft nontender nondistended with positive bowel sounds  No diarrhea as of this morning    Skin:  Warm dry    Labs:      Lab Results   Component Value Date    WBC 11 12 (H) 04/09/2020    HGB 11 1 (L) 04/09/2020    HCT 32 6 (L) 04/09/2020    MCV 79 (L) 04/09/2020     (H) 04/09/2020     Lab Results   Component Value Date    HGBA1C 5 8 (H) 04/01/2020     Lab Results   Component Value Date    ALT 64 04/08/2020    AST 58 (H) 04/08/2020    ALKPHOS 106 04/08/2020     Lab Results   Component Value Date    CALCIUM 8 6 04/10/2020    K 3 5 04/10/2020    CO2 22 04/10/2020    CL 99 (L) 04/10/2020    BUN 17 04/10/2020    CREATININE 1 09 04/10/2020     Fasting glucose 1  Ferritin level 971  CRP 92 3    Review of reports and notes reveal:   Independent Interpretation of images or specimens:  Xr Chest 1 View Portable Result Date: 4/7/2020  Subtle peripheral right greater than left ground glass opacity, compatible with viral pneumonia  The study was marked in [facility] for immediate notification  Workstation performed: [id number]      Ct Head Without Contrast Result Date: 4/7/2020  No acute intracranial hemorrhage No mass effect or midline shift   Workstation performed: [id number]        Thank you for this consult      Total time of encounter:  30 min  More than 50% of the time was used in counseling and/or coordination of care  Extent of couseling and/or coordination of care

## 2020-04-10 NOTE — DISCHARGE SUMMARY
Discharge- Abbie Rod 1955, 60 y o  female MRN: [id number]    Unit/Bed#: [address] [address] Encounter: [id number]    Primary Care Provider: Sherice Clark DO   Date and time admitted to hospital: 4/7/2020 12:20 PM        * Pneumonia due to COVID-19 virus  Assessment & Plan  Patient presented with visual hallucinations after suffering from COVID-19 symptoms since 04/01/2020 when she presented to her PCP and was tested  She presented to the ED  since her test results came back positive  She is currently on contact/airborne/droplet isolation  Continue therapy with hydroxychloroquine 200 mg PO Q 12h x4 days to end on Saturday 4/11  Continue azithromycin 250 mg PO daily x 4 days to end on Saturday 4/11  Baseline EKG on 04/07 showed QTc 438ms  Last EKG on 03/31/2020 showed QTc 442ms  Continue telemetry to monitor QTC interval daily and also monitor glycemia while on hydroxychloroquine  Inflammatory markers:  Procalcitonin trend 0 99-->0 70, ferritin trend 1355--> 1322--> 971, C-reactive protein trend 217 7-->239 9-->180 3--> 92 3  Patient is improving significantly and is currently oxygenating well on room air  Patient is currently hemodynamically stable for discharge to home today  She has to self isolate for 5 days from when she was symptomatic  Hyponatremia  Assessment & Plan  Most likely secondary to decreased oral intake from pneumonia  Continue to encourage oral hydration  Sodium improved from 128--> 131--> 130--> 133  Hypokalemia  Assessment & Plan  Improved to K of 3 5  Will replace with potassium chloride 40 mEq PO  Hallucinations  Assessment & Plan  Patient presented complaining of visual hallucinations with fatigue and dizziness as part of her symptoms of COVID-19 viral infection  Symptoms had been present for 1 week prior to her presentation  MRI of brain performed on 03/31/2020 due to concern for encephalitis was negative    Neurology consult placed-and no further neurologic interventional recommendations as it is felt that her hallucinations are part of her overall viral infection picture  Due to patient's hallucinations associated with dizziness and visual disturbances, neurology recommends outpatient ophthalmology follow-up for the visual disturbances and also follow-up with neurology in 6 weeks  Neurology will consider repeat MRI of brain with optical nerve evaluation if the visual disturbances do not improve with resolution of the viral illness  Asymptomatic bacteriuria  Assessment & Plan  Urine culture growing [id number] group B strep  Urinalysis only shows 2-4 WBC and patient is asymptomatic  Continue to monitor off antibiotics  Discharging Physician / Practitioner: Li Dozier MD  PCP: Valerie Lutz DO  Admission Date:   Admission Orders (From admission, onward)     Ordered        04/07/20 1405  Inpatient Admission (expected length of stay for this patient Order details is greater than two midnights)  Once                   Discharge Date: 04/10/20    Resolved Problems  Date Reviewed: 4/10/2020    None          Consultations During Hospital Stay:  · Infectious Diseases, Neurology    Procedures Performed:   · Portable chest x-ray 04/07/2020, CT head without contrast 04/07/2020, 12 lead EKG 04/07/2020  Significant Findings / Test Results:   · Portable chest x-ray 4/7-subtle peripheral right greater than left ground-glass opacity, compatible with viral pneumonia  · CT head without contrast 4/7-no acute intracranial hemorrhage  No mass effect or midline shift  · Twelve lead EKG 4/7-normal sinus rhythm with possible left atrial enlargement  Incidental Findings:   · None     Test Results Pending at Discharge (will require follow up): · None     Outpatient Tests Requested:  · Ophthalmological eye exam with ophthalmology    Complications:  None    Reason for Admission: COVID-19 viral pneumonia    Hospital Course:      Trevin Vasquez is a 60 y o  female patient who originally presented to the hospital on 4/7/2020 due to fatigue, hallucinations with dizziness and visual disturbances which she had been suffering from for a week prior to her present admission  She had presented to her PCP on 04/01/2020 with the symptoms and she was tested for COVID-19  She tested positive and she was instructed by PCP to go to the ED for treatment  In the ED was noted to have a low-grade fever and was oxygenating at 98% on room air  Portable chest x-ray revealed subtle peripheral right greater than left ground-glass opacity compatible with viral pneumonia  CT of the head without contrast was also negative  She was placed on contact/airborne/droplet isolation and treated with hydroxychloroquine, azithromycin causing sulfate, vitamin-C, atorvastatin  She was also placed on telemetry with monitoring of her QTc intervals  Infectious disease was consulted as well as Neurology  Neurology evaluated patient for her hallucinations and not recommend any further neurologic intervention during this admission as she had recently had an MRI of the brain on 03/31 which was within normal limits  They did recommend outpatient follow-up with ophthalmology for eye exam due to the visual disturbances as well as neurology follow-up in 6 weeks for her hallucinations if still present  She responded very well to therapy and eventually she was able to be weaned off oxygen and is oxygenating well on room air  Infectious diseases has cleared patient for discharge to home  She will be discharged home to complete the remainder of her hydroxychloroquine and azithromycin  She has to self isolate for 5 days from her last symptoms  She is currently hemodynamically stable for discharge to home today  Please see above list of diagnoses and related plan for additional information       Condition at Discharge: stable     Discharge Day Visit / Exam:     * Please refer to separate progress note for these details *    Discussion with Family:  No    Discharge instructions/Information to patient and family:   See after visit summary for information provided to patient and family  Provisions for Follow-Up Care:  See after visit summary for information related to follow-up care and any pertinent home health orders  Disposition:     Home    For Discharges to [facility] SNF:   · Not Applicable to this Patient - Not Applicable to this Patient    Planned Readmission:  None     Discharge Statement:  I spent 25 minutes discharging the patient  This time was spent on the day of discharge  I had direct contact with the patient on the day of discharge  Greater than 50% of the total time was spent examining patient, answering all patient questions, arranging and discussing plan of care with patient as well as directly providing post-discharge instructions  Additional time then spent on discharge activities  Discharge Medications:  See after visit summary for reconciled discharge medications provided to patient and family        ** Please Note: This note has been constructed using a voice recognition system **

## 2020-04-10 NOTE — ASSESSMENT & PLAN NOTE
Most likely secondary to decreased oral intake from pneumonia  Continue to encourage oral hydration  Sodium improved from 128--> 131--> 130--> 133

## 2020-04-10 NOTE — SOCIAL WORK
[facility] team discharged pt to return home  No further discharge needs  CM will remain available for any d/c needs

## 2020-04-11 ENCOUNTER — TRANSITIONAL CARE MANAGEMENT (OUTPATIENT)
Dept: FAMILY MEDICINE CLINIC | Facility: CLINIC | Age: 65
End: 2020-04-11

## 2020-04-13 ENCOUNTER — TELEPHONE (OUTPATIENT)
Dept: FAMILY MEDICINE CLINIC | Facility: CLINIC | Age: 65
End: 2020-04-13

## 2020-04-14 ENCOUNTER — TELEMEDICINE (OUTPATIENT)
Dept: FAMILY MEDICINE CLINIC | Facility: CLINIC | Age: 65
End: 2020-04-14

## 2020-04-14 VITALS — HEIGHT: 68 IN | WEIGHT: 174 LBS | BODY MASS INDEX: 26.37 KG/M2

## 2020-04-14 DIAGNOSIS — U07.1 COVID-19 VIRUS INFECTION: Primary | ICD-10-CM

## 2020-04-14 PROCEDURE — 99495 TRANSJ CARE MGMT MOD F2F 14D: CPT | Performed by: FAMILY MEDICINE

## 2020-04-27 ENCOUNTER — TELEPHONE (OUTPATIENT)
Dept: FAMILY MEDICINE CLINIC | Facility: CLINIC | Age: 65
End: 2020-04-27

## 2020-04-27 DIAGNOSIS — U07.1 COVID-19 VIRUS INFECTION: Primary | ICD-10-CM

## 2020-04-27 RX ORDER — ALBUTEROL SULFATE 90 UG/1
2 AEROSOL, METERED RESPIRATORY (INHALATION) EVERY 4 HOURS PRN
Qty: 1 INHALER | Refills: 1 | Status: SHIPPED | OUTPATIENT
Start: 2020-04-27 | End: 2022-05-06 | Stop reason: ALTCHOICE

## 2020-04-28 ENCOUNTER — TELEPHONE (OUTPATIENT)
Dept: NEUROLOGY | Facility: CLINIC | Age: 65
End: 2020-04-28

## 2020-05-01 ENCOUNTER — TELEPHONE (OUTPATIENT)
Dept: PULMONOLOGY | Facility: CLINIC | Age: 65
End: 2020-05-01

## 2020-05-15 ENCOUNTER — TELEPHONE (OUTPATIENT)
Dept: FAMILY MEDICINE CLINIC | Facility: CLINIC | Age: 65
End: 2020-05-15

## 2020-05-19 ENCOUNTER — TELEPHONE (OUTPATIENT)
Dept: FAMILY MEDICINE CLINIC | Facility: CLINIC | Age: 65
End: 2020-05-19

## 2020-05-19 DIAGNOSIS — Z20.828 EXPOSURE TO SARS-ASSOCIATED CORONAVIRUS: Primary | ICD-10-CM

## 2020-05-21 LAB — SARS-COV-2 IGG SERPL QL IA: POSITIVE

## 2020-05-29 ENCOUNTER — TELEPHONE (OUTPATIENT)
Dept: FAMILY MEDICINE CLINIC | Facility: CLINIC | Age: 65
End: 2020-05-29

## 2020-08-29 LAB
ATRIAL RATE: 91 BPM
P AXIS: 58 DEGREES
PR INTERVAL: 152 MS
QRS AXIS: 32 DEGREES
QRSD INTERVAL: 68 MS
QT INTERVAL: 362 MS
QTC INTERVAL: 445 MS
T WAVE AXIS: 20 DEGREES
VENTRICULAR RATE: 91 BPM

## 2020-08-29 PROCEDURE — 93010 ELECTROCARDIOGRAM REPORT: CPT | Performed by: INTERNAL MEDICINE

## 2020-11-06 ENCOUNTER — OFFICE VISIT (OUTPATIENT)
Dept: FAMILY MEDICINE CLINIC | Facility: CLINIC | Age: 65
End: 2020-11-06

## 2020-11-06 VITALS
SYSTOLIC BLOOD PRESSURE: 122 MMHG | WEIGHT: 182 LBS | TEMPERATURE: 98.6 F | RESPIRATION RATE: 16 BRPM | DIASTOLIC BLOOD PRESSURE: 74 MMHG | HEIGHT: 68 IN | HEART RATE: 84 BPM | BODY MASS INDEX: 27.58 KG/M2

## 2020-11-06 DIAGNOSIS — M12.9 ARTHRITIS, MULTIPLE JOINT INVOLVEMENT: ICD-10-CM

## 2020-11-06 DIAGNOSIS — R76.8 SS-A ANTIBODY POSITIVE: ICD-10-CM

## 2020-11-06 DIAGNOSIS — Z12.31 SCREENING MAMMOGRAM, ENCOUNTER FOR: ICD-10-CM

## 2020-11-06 DIAGNOSIS — B35.1 ONYCHOMYCOSIS OF GREAT TOE: ICD-10-CM

## 2020-11-06 DIAGNOSIS — Z00.00 ANNUAL PHYSICAL EXAM: Primary | ICD-10-CM

## 2020-11-06 PROBLEM — U07.1 COVID-19 VIRUS INFECTION: Status: RESOLVED | Noted: 2020-04-07 | Resolved: 2020-11-06

## 2020-11-06 PROBLEM — R82.71 ASYMPTOMATIC BACTERIURIA: Status: RESOLVED | Noted: 2020-04-09 | Resolved: 2020-11-06

## 2020-11-06 PROBLEM — R07.89 CHEST TIGHTNESS: Status: RESOLVED | Noted: 2020-03-31 | Resolved: 2020-11-06

## 2020-11-06 PROBLEM — U07.1 PNEUMONIA DUE TO COVID-19 VIRUS: Status: RESOLVED | Noted: 2020-04-09 | Resolved: 2020-11-06

## 2020-11-06 PROBLEM — R44.3 HALLUCINATIONS: Status: RESOLVED | Noted: 2020-04-08 | Resolved: 2020-11-06

## 2020-11-06 PROBLEM — J12.82 PNEUMONIA DUE TO COVID-19 VIRUS: Status: RESOLVED | Noted: 2020-04-09 | Resolved: 2020-11-06

## 2020-11-06 PROCEDURE — 99396 PREV VISIT EST AGE 40-64: CPT | Performed by: FAMILY MEDICINE

## 2021-02-15 ENCOUNTER — APPOINTMENT (EMERGENCY)
Dept: RADIOLOGY | Facility: HOSPITAL | Age: 66
End: 2021-02-15

## 2021-02-15 ENCOUNTER — HOSPITAL ENCOUNTER (EMERGENCY)
Facility: HOSPITAL | Age: 66
Discharge: HOME | End: 2021-02-15
Attending: EMERGENCY MEDICINE | Admitting: EMERGENCY MEDICINE

## 2021-02-15 VITALS
HEART RATE: 59 BPM | DIASTOLIC BLOOD PRESSURE: 75 MMHG | TEMPERATURE: 96.9 F | OXYGEN SATURATION: 100 % | SYSTOLIC BLOOD PRESSURE: 173 MMHG | RESPIRATION RATE: 20 BRPM | WEIGHT: 167 LBS | BODY MASS INDEX: 25.39 KG/M2

## 2021-02-15 DIAGNOSIS — R10.9 ABDOMINAL PAIN: Primary | ICD-10-CM

## 2021-02-15 DIAGNOSIS — R11.2 NAUSEA AND VOMITING: ICD-10-CM

## 2021-02-15 LAB
ALBUMIN SERPL BCP-MCNC: 3.2 G/DL (ref 3.5–5)
ALP SERPL-CCNC: 99 U/L (ref 46–116)
ALT SERPL W P-5'-P-CCNC: 61 U/L (ref 12–78)
ANION GAP SERPL CALCULATED.3IONS-SCNC: 7 MMOL/L (ref 4–13)
AST SERPL W P-5'-P-CCNC: 92 U/L (ref 5–45)
ATRIAL RATE: 57 BPM
BASOPHILS # BLD AUTO: 0.05 THOUSANDS/ΜL (ref 0–0.1)
BASOPHILS NFR BLD AUTO: 1 % (ref 0–1)
BILIRUB SERPL-MCNC: 0.5 MG/DL (ref 0.2–1)
BILIRUB UR QL STRIP: NEGATIVE
BUN SERPL-MCNC: 19 MG/DL (ref 5–25)
CALCIUM ALBUM COR SERPL-MCNC: 9.8 MG/DL (ref 8.3–10.1)
CALCIUM SERPL-MCNC: 9.2 MG/DL (ref 8.3–10.1)
CHLORIDE SERPL-SCNC: 104 MMOL/L (ref 100–108)
CLARITY UR: CLEAR
CO2 SERPL-SCNC: 28 MMOL/L (ref 21–32)
COLOR UR: YELLOW
CREAT SERPL-MCNC: 0.85 MG/DL (ref 0.6–1.3)
EOSINOPHIL # BLD AUTO: 0.16 THOUSAND/ΜL (ref 0–0.61)
EOSINOPHIL NFR BLD AUTO: 2 % (ref 0–6)
ERYTHROCYTE [DISTWIDTH] IN BLOOD BY AUTOMATED COUNT: 13.5 % (ref 11.6–15.1)
GFR SERPL CREATININE-BSD FRML MDRD: 84 ML/MIN/1.73SQ M
GLUCOSE SERPL-MCNC: 160 MG/DL (ref 65–140)
GLUCOSE UR STRIP-MCNC: NEGATIVE MG/DL
HCT VFR BLD AUTO: 35.6 % (ref 34.8–46.1)
HGB BLD-MCNC: 11.2 G/DL (ref 11.5–15.4)
HGB UR QL STRIP.AUTO: NEGATIVE
IMM GRANULOCYTES # BLD AUTO: 0.01 THOUSAND/UL (ref 0–0.2)
IMM GRANULOCYTES NFR BLD AUTO: 0 % (ref 0–2)
KETONES UR STRIP-MCNC: NEGATIVE MG/DL
LEUKOCYTE ESTERASE UR QL STRIP: NEGATIVE
LIPASE SERPL-CCNC: 127 U/L (ref 73–393)
LYMPHOCYTES # BLD AUTO: 4.72 THOUSANDS/ΜL (ref 0.6–4.47)
LYMPHOCYTES NFR BLD AUTO: 53 % (ref 14–44)
MCH RBC QN AUTO: 26.3 PG (ref 26.8–34.3)
MCHC RBC AUTO-ENTMCNC: 31.5 G/DL (ref 31.4–37.4)
MCV RBC AUTO: 84 FL (ref 82–98)
MONOCYTES # BLD AUTO: 0.77 THOUSAND/ΜL (ref 0.17–1.22)
MONOCYTES NFR BLD AUTO: 9 % (ref 4–12)
NEUTROPHILS # BLD AUTO: 3.09 THOUSANDS/ΜL (ref 1.85–7.62)
NEUTS SEG NFR BLD AUTO: 35 % (ref 43–75)
NITRITE UR QL STRIP: NEGATIVE
NRBC BLD AUTO-RTO: 0 /100 WBCS
P AXIS: 37 DEGREES
PH UR STRIP.AUTO: 6.5 [PH]
PLATELET # BLD AUTO: 231 THOUSANDS/UL (ref 149–390)
PMV BLD AUTO: 11.4 FL (ref 8.9–12.7)
POTASSIUM SERPL-SCNC: 4.4 MMOL/L (ref 3.5–5.3)
PR INTERVAL: 152 MS
PROT SERPL-MCNC: 7.8 G/DL (ref 6.4–8.2)
PROT UR STRIP-MCNC: NEGATIVE MG/DL
QRS AXIS: 27 DEGREES
QRSD INTERVAL: 70 MS
QT INTERVAL: 434 MS
QTC INTERVAL: 422 MS
RBC # BLD AUTO: 4.26 MILLION/UL (ref 3.81–5.12)
SODIUM SERPL-SCNC: 139 MMOL/L (ref 136–145)
SP GR UR STRIP.AUTO: 1.01 (ref 1–1.03)
T WAVE AXIS: 21 DEGREES
TROPONIN I SERPL-MCNC: <0.02 NG/ML
UROBILINOGEN UR QL STRIP.AUTO: 0.2 E.U./DL
VENTRICULAR RATE: 57 BPM
WBC # BLD AUTO: 8.8 THOUSAND/UL (ref 4.31–10.16)

## 2021-02-15 PROCEDURE — 80053 COMPREHEN METABOLIC PANEL: CPT | Performed by: EMERGENCY MEDICINE

## 2021-02-15 PROCEDURE — 84484 ASSAY OF TROPONIN QUANT: CPT | Performed by: EMERGENCY MEDICINE

## 2021-02-15 PROCEDURE — 99284 EMERGENCY DEPT VISIT MOD MDM: CPT

## 2021-02-15 PROCEDURE — 96361 HYDRATE IV INFUSION ADD-ON: CPT

## 2021-02-15 PROCEDURE — 96374 THER/PROPH/DIAG INJ IV PUSH: CPT

## 2021-02-15 PROCEDURE — 81003 URINALYSIS AUTO W/O SCOPE: CPT | Performed by: EMERGENCY MEDICINE

## 2021-02-15 PROCEDURE — 93005 ELECTROCARDIOGRAM TRACING: CPT

## 2021-02-15 PROCEDURE — 99284 EMERGENCY DEPT VISIT MOD MDM: CPT | Performed by: EMERGENCY MEDICINE

## 2021-02-15 PROCEDURE — 36415 COLL VENOUS BLD VENIPUNCTURE: CPT | Performed by: EMERGENCY MEDICINE

## 2021-02-15 PROCEDURE — 93010 ELECTROCARDIOGRAM REPORT: CPT | Performed by: INTERNAL MEDICINE

## 2021-02-15 PROCEDURE — 74177 CT ABD & PELVIS W/CONTRAST: CPT

## 2021-02-15 PROCEDURE — 96375 TX/PRO/DX INJ NEW DRUG ADDON: CPT

## 2021-02-15 PROCEDURE — 85025 COMPLETE CBC W/AUTO DIFF WBC: CPT | Performed by: EMERGENCY MEDICINE

## 2021-02-15 PROCEDURE — 83690 ASSAY OF LIPASE: CPT | Performed by: EMERGENCY MEDICINE

## 2021-02-15 RX ORDER — ONDANSETRON 4 MG/1
4 TABLET, ORALLY DISINTEGRATING ORAL EVERY 6 HOURS PRN
Qty: 15 TABLET | Refills: 0 | Status: SHIPPED | OUTPATIENT
Start: 2021-02-15 | End: 2022-05-06 | Stop reason: ALTCHOICE

## 2021-02-15 RX ORDER — FAMOTIDINE 20 MG/1
20 TABLET, FILM COATED ORAL 2 TIMES DAILY
Qty: 28 TABLET | Refills: 0 | Status: SHIPPED | OUTPATIENT
Start: 2021-02-15 | End: 2022-05-06 | Stop reason: ALTCHOICE

## 2021-02-15 RX ORDER — ONDANSETRON 2 MG/ML
4 INJECTION INTRAMUSCULAR; INTRAVENOUS ONCE
Status: COMPLETED | OUTPATIENT
Start: 2021-02-15 | End: 2021-02-15

## 2021-02-15 RX ADMIN — ONDANSETRON 4 MG: 2 INJECTION INTRAMUSCULAR; INTRAVENOUS at 05:20

## 2021-02-15 RX ADMIN — SODIUM CHLORIDE 1000 ML: 0.9 INJECTION, SOLUTION INTRAVENOUS at 05:20

## 2021-02-15 RX ADMIN — IOHEXOL 100 ML: 350 INJECTION, SOLUTION INTRAVENOUS at 06:10

## 2021-02-15 RX ADMIN — FAMOTIDINE 20 MG: 10 INJECTION INTRAVENOUS at 05:20

## 2021-02-15 NOTE — ED PROCEDURE NOTE
PROCEDURE  ECG 12 Lead Documentation Only    Date/Time: 2/15/2021 5:14 AM  Performed by: Farhat Cassidy DO  Authorized by: Farhat Cassidy DO     ECG reviewed by me, the ED Provider: yes    Patient location:  ED  Interpretation:     Interpretation: normal    Rate:     ECG rate:  57    ECG rate assessment: bradycardic    Rhythm:     Rhythm: sinus rhythm    Ectopy:     Ectopy: none    ST segments:     ST segments:  Normal  T waves:     T waves: normal           Farhat Cassidy DO  02/15/21 [id number]

## 2021-02-15 NOTE — DISCHARGE INSTRUCTIONS
Acute Abdominal Pain   WHAT YOU NEED TO KNOW:   The cause of your abdominal pain may not be found  If a cause is found, treatment will depend on what the cause is  DISCHARGE INSTRUCTIONS:   Seek care immediately if:   · You vomit blood or cannot stop vomiting  · You have blood in your bowel movement or it looks like tar  · You have bleeding from your rectum  · Your abdomen is larger than usual, more painful, and hard  · You have severe pain in your abdomen  · You stop passing gas and having bowel movements  · You feel weak, dizzy, or faint  Contact your healthcare provider if:   · You have a fever  · You have new signs and symptoms  · Your symptoms do not get better with treatment  · You have questions or concerns about your condition or care  Medicines  may be given to decrease pain, treat an infection, and manage your symptoms  Take your medicine as directed  Call your healthcare provider if you think your medicine is not helping or if you have side effects  Tell him if you are allergic to any medicine  Keep a list of the medicines, vitamins, and herbs you take  Include the amounts, and when and why you take them  Bring the list or the pill bottles to follow-up visits  Carry your medicine list with you in case of an emergency  Manage your symptoms:   · Apply heat  on your abdomen for 20 to 30 minutes every 2 hours for as many days as directed  Heat helps decrease pain and muscle spasms  · Manage your stress  Stress may cause abdominal pain  Your healthcare provider may recommend relaxation techniques and deep breathing exercises to help decrease your stress  Your healthcare provider may recommend you talk to someone about your stress or anxiety, such as a counselor or a trusted friend  Get plenty of sleep and exercise regularly  · Limit or do not drink alcohol  Alcohol can make your abdominal pain worse   Ask your healthcare provider if it is safe for you to drink alcohol  Also ask how much is safe for you to drink  · Do not smoke  Nicotine and other chemicals in cigarettes can damage your esophagus and stomach  Ask your healthcare provider for information if you currently smoke and need help to quit  E-cigarettes or smokeless tobacco still contain nicotine  Talk to your healthcare provider before you use these products  Make changes to the food you eat as directed:  Do not eat foods that cause abdominal pain or other symptoms  Eat small meals more often  · Eat more high-fiber foods if you are constipated  High-fiber foods include fruits, vegetables, whole-grain foods, and legumes  · Do not eat foods that cause gas if you have bloating  Examples include broccoli, cabbage, and cauliflower  Do not drink soda or carbonated drinks, because these may also cause gas  · Do not eat foods or drinks that contain sorbitol or fructose if you have diarrhea and bloating  Some examples are fruit juices, candy, jelly, and sugar-free gum  · Do not eat high-fat foods, such as fried foods, cheeseburgers, hot dogs, and desserts  · Limit or do not drink caffeine  Caffeine may make symptoms, such as heart burn or nausea, worse  · Drink plenty of liquids to prevent dehydration from diarrhea or vomiting  Ask your healthcare provider how much liquid to drink each day and which liquids are best for you  Follow up with your healthcare provider as directed:  Write down your questions so you remember to ask them during your visits  © Copyright [address] Information is for End User's use only and may not be sold, redistributed or otherwise used for commercial purposes  All illustrations and images included in CareNotes® are the copyrighted property of A D A M [employer] Inc  or [address] [place] [address] [place]   The above information is an  only  It is not intended as medical advice for individual conditions or treatments   Talk to your doctor, nurse or pharmacist before following any medical regimen to see if it is safe and effective for you

## 2021-02-15 NOTE — ED PROVIDER NOTES
History  Chief Complaint   Patient presents with    Abdominal Pain     patient c/o epigastric pain, radiating to left shoulder, started 1 hour ago while asleep, did throw up "some red stuff", denies shortness of breath     Patient presents for evaluation of epigastric pain with nausea vomiting  The atient woke up from sleep with abdominal pain and vomiting  Patient now knows some bright red streaks in her vomit  Denies any chest pain shortness of breaths  No apparent modifying factors for the pain  Pain does radiate to the left shoulder as well  No history of similar episodes  Denies smoking drinking or drug use  Does not report feeling ill prior to going to bed and no known sick contacts in the household are close contacts  History provided by:  Patient   used: No    Abdominal Pain  Associated symptoms: nausea and vomiting    Associated symptoms: no chest pain, no chills, no cough, no diarrhea, no dysuria, no fever, no shortness of breath and no sore throat        Prior to Admission Medications   Prescriptions Last Dose Informant Patient Reported? Taking?    albuterol (ProAir HFA) 90 mcg/act inhaler Not Taking at Unknown time  No No   Sig: Inhale 2 puffs every 4 (four) hours as needed for wheezing   Patient not taking: Reported on 2/15/2021   ascorbic acid (VITAMIN C) 1000 MG tablet 2/14/2021 at Unknown time  No Yes   Sig: Take 1 tablet (1,000 mg total) by mouth 2 (two) times a day   ciclopirox (PENLAC) 8 % solution Not Taking at Unknown time  No No   Sig: Apply topically daily at bedtime   Patient not taking: Reported on 2/15/2021   zinc sulfate (ZINCATE) 220 mg capsule 2/14/2021 at Unknown time  No Yes   Sig: Take 1 capsule (220 mg total) by mouth daily      Facility-Administered Medications: None       Past Medical History:   Diagnosis Date    Asymptomatic bacteriuria 4/9/2020    Chest tightness 3/31/2020    COVID-19 virus infection 4/7/2020    H/O antinuclear antibodies     Hallucinations 4/8/2020    Pneumonia due to COVID-19 virus 4/9/2020    SIRS (systemic inflammatory response syndrome) ([facility] [address] ) 3/31/2020    Urinary tract infection     last assessed: 10/24/2013       Past Surgical History:   Procedure Laterality Date    APPENDECTOMY         Family History   Problem Relation Age of Onset    Diabetes Mother      I have reviewed and agree with the history as documented  E-Cigarette/Vaping    E-Cigarette Use Never User      E-Cigarette/Vaping Substances     Social History     Tobacco Use    Smoking status: Never Smoker    Smokeless tobacco: Never Used   Substance Use Topics    Alcohol use: Not Currently     Frequency: Never     Binge frequency: Never    Drug use: Not Currently       Review of Systems   Constitutional: Negative for chills and fever  HENT: Negative for congestion, sore throat and trouble swallowing  Respiratory: Negative for cough and shortness of breath  Cardiovascular: Negative for chest pain  Gastrointestinal: Positive for abdominal pain, nausea and vomiting  Negative for blood in stool and diarrhea  Genitourinary: Negative for difficulty urinating and dysuria  Musculoskeletal: Negative for back pain and neck pain  Neurological: Negative for weakness, numbness and headaches  All other systems reviewed and are negative  Physical Exam  Physical Exam  Vitals signs and nursing note reviewed  Constitutional:       General: She is not in acute distress  Appearance: She is ill-appearing  HENT:      Head: Atraumatic  Mouth/Throat:      Mouth: Mucous membranes are moist       Pharynx: Oropharynx is clear  No oropharyngeal exudate  Eyes:      Extraocular Movements: Extraocular movements intact  Pupils: Pupils are equal, round, and reactive to light  Neck:      Musculoskeletal: Normal range of motion and neck supple  Cardiovascular:      Rate and Rhythm: Regular rhythm  Bradycardia present  Pulses: Normal pulses  Pulmonary:      Effort: Pulmonary effort is normal  No respiratory distress  Breath sounds: Normal breath sounds  No wheezing, rhonchi or rales  Abdominal:      General: Abdomen is flat  Bowel sounds are normal  There is no distension  Palpations: Abdomen is soft  Tenderness: There is abdominal tenderness  There is guarding  There is no rebound  Comments: Epigastric tenderness with guarding  Musculoskeletal: Normal range of motion  Right lower leg: No edema  Left lower leg: No edema  Skin:     Capillary Refill: Capillary refill takes less than 2 seconds  Findings: No rash  Neurological:      General: No focal deficit present  Mental Status: She is alert and oriented to person, place, and time           Vital Signs  ED Triage Vitals   Temperature Pulse Respirations Blood Pressure SpO2   02/15/21 0512 02/15/21 0504 02/15/21 0504 02/15/21 0504 02/15/21 0504   (!) 96 9 °F (36 1 °C) 58 (!) 24 163/81 100 %      Temp Source Heart Rate Source Patient Position - Orthostatic VS BP Location FiO2 (%)   02/15/21 0512 02/15/21 0504 02/15/21 0504 02/15/21 0504 --   Tympanic Monitor Sitting - Orthostatic VS Left arm       Pain Score       --                  Vitals:    02/15/21 0504 02/15/21 0515 02/15/21 0531 02/15/21 0600   BP: 163/81 163/81 (!) 172/73 166/74   Pulse: 58 86 (!) 53 74   Patient Position - Orthostatic VS: Sitting - Orthostatic VS Sitting Sitting Sitting         Visual Acuity      ED Medications  Medications   sodium chloride 0 9 % bolus 1,000 mL (1,000 mL Intravenous New Bag 2/15/21 0520)   ondansetron (ZOFRAN) injection 4 mg (4 mg Intravenous Given 2/15/21 0520)   famotidine (PEPCID) injection 20 mg (20 mg Intravenous Given 2/15/21 0520)   iohexol (OMNIPAQUE) 350 MG/ML injection (SINGLE-DOSE) 100 mL (100 mL Intravenous Given 2/15/21 0610)       Diagnostic Studies  Results Reviewed     Procedure Component Value Units Date/Time    Troponin I [[id number]]  (Normal) Collected: 02/15/21 0519    Lab Status: Final result Specimen: Blood from Arm, Right Updated: 02/15/21 0553     Troponin I <0 02 ng/mL     Comprehensive metabolic panel [[id number]]  (Abnormal) Collected: 02/15/21 0519    Lab Status: Final result Specimen: Blood from Arm, Right Updated: 02/15/21 0548     Sodium 139 mmol/L      Potassium 4 4 mmol/L      Chloride 104 mmol/L      CO2 28 mmol/L      ANION GAP 7 mmol/L      BUN 19 mg/dL      Creatinine 0 85 mg/dL      Glucose 160 mg/dL      Calcium 9 2 mg/dL      Corrected Calcium 9 8 mg/dL      AST 92 U/L      ALT 61 U/L      Alkaline Phosphatase 99 U/L      Total Protein 7 8 g/dL      Albumin 3 2 g/dL      Total Bilirubin 0 50 mg/dL      eGFR 84 ml/min/1 73sq m     Narrative:      [place] guidelines for Chronic Kidney Disease (CKD):     Stage 1 with normal or high GFR (GFR > 90 mL/min/1 73 square meters)    Stage 2 Mild CKD (GFR = 60-89 mL/min/1 73 square meters)    Stage 3A Moderate CKD (GFR = 45-59 mL/min/1 73 square meters)    Stage 3B Moderate CKD (GFR = 30-44 mL/min/1 73 square meters)    Stage 4 Severe CKD (GFR = 15-29 mL/min/1 73 square meters)    Stage 5 End Stage CKD (GFR <15 mL/min/1 73 square meters)  Note: GFR calculation is accurate only with a steady state creatinine    Lipase [[id number]]  (Normal) Collected: 02/15/21 0519    Lab Status: Final result Specimen: Blood from Arm, Right Updated: 02/15/21 0548     Lipase 127 u/L     UA (URINE) with reflex to Scope [[id number]] Collected: 02/15/21 0529    Lab Status: Final result Specimen: Urine, Clean Catch Updated: 02/15/21 0541     Color, UA Yellow     Clarity, UA Clear     Specific Gravity, UA 1 015     pH, UA 6 5     Leukocytes, UA Negative     Nitrite, UA Negative     Protein, UA Negative mg/dl      Glucose, UA Negative mg/dl      Ketones, UA Negative mg/dl      Urobilinogen, UA 0 2 E U /dl      Bilirubin, UA Negative     Blood, UA Negative    CBC and differential [[id number]] (Abnormal) Collected: 02/15/21 0519    Lab Status: Final result Specimen: Blood from Arm, Right Updated: 02/15/21 0530     WBC 8 80 Thousand/uL      RBC 4 26 Million/uL      Hemoglobin 11 2 g/dL      Hematocrit 35 6 %      MCV 84 fL      MCH 26 3 pg      MCHC 31 5 g/dL      RDW 13 5 %      MPV 11 4 fL      Platelets 451 Thousands/uL      nRBC 0 /100 WBCs      Neutrophils Relative 35 %      Immat GRANS % 0 %      Lymphocytes Relative 53 %      Monocytes Relative 9 %      Eosinophils Relative 2 %      Basophils Relative 1 %      Neutrophils Absolute 3 09 Thousands/µL      Immature Grans Absolute 0 01 Thousand/uL      Lymphocytes Absolute 4 72 Thousands/µL      Monocytes Absolute 0 77 Thousand/µL      Eosinophils Absolute 0 16 Thousand/µL      Basophils Absolute 0 05 Thousands/µL                  CT abdomen pelvis with contrast   Final Result by Holly Conroy MD (02/15 1919)      No acute pathology  Colonic diverticulosis  Workstation performed: [id number]                    Procedures  Procedures         ED Course                                           MDM  Number of Diagnoses or Management Options  Abdominal pain:   Nausea and vomiting:   Diagnosis management comments: Pulse ox 100% on room air indicating adequate oxygenation  Discussed CT and lab results with patient  No findings for patient's acute symptoms  On repeat exam patient is feeling better  No further vomiting in the ER  Will give prescription for Zofran home nausea vomiting continues and prescription for Pepcid  Given outpatient follow-up with GI and primary care doctor         Amount and/or Complexity of Data Reviewed  Clinical lab tests: ordered and reviewed  Tests in the radiology section of CPT®: ordered and reviewed  Decide to obtain previous medical records or to obtain history from someone other than the patient: yes  Review and summarize past medical records: yes    Patient Progress  Patient progress: stable      Disposition  Final diagnoses:   Abdominal pain   Nausea and vomiting     Time reflects when diagnosis was documented in both MDM as applicable and the Disposition within this note     Time User Action Codes Description Comment    2/15/2021  6:31 AM Bishop Milks Add [R10 9] Abdominal pain     2/15/2021  6:31 AM Bishop Milks Add [R11 2] Nausea and vomiting       ED Disposition     ED Disposition Condition Date/Time Comment    Discharge Stable Mon Feb 15, 2021  6:31 AM Mary Ball discharge to home/self care  Follow-up Information     Follow up With Specialties Details Why [address] Hira Polanco DO Family Medicine In 1 week  [address] [facility] [address]  [phone]      Frederick Chino MD Gastroenterology In 1 week  [address]  [phone]            Patient's Medications   Discharge Prescriptions    FAMOTIDINE (PEPCID) 20 MG TABLET    Take 1 tablet (20 mg total) by mouth 2 (two) times a day for 14 days       Start Date: 2/15/2021 End Date: 3/1/2021       Order Dose: 20 mg       Quantity: 28 tablet    Refills: 0    ONDANSETRON (ZOFRAN-ODT) 4 MG DISINTEGRATING TABLET    Take 1 tablet (4 mg total) by mouth every 6 (six) hours as needed for nausea or vomiting for up to 15 doses       Start Date: 2/15/2021 End Date: --       Order Dose: 4 mg       Quantity: 15 tablet    Refills: 0     No discharge procedures on file      PDMP Review     None          ED Provider  Electronically Signed by           Pardeep Wright DO  02/15/21 7855

## 2021-02-17 ENCOUNTER — VBI (OUTPATIENT)
Dept: FAMILY MEDICINE CLINIC | Facility: CLINIC | Age: 66
End: 2021-02-17

## 2021-02-17 NOTE — TELEPHONE ENCOUNTER
Rachid Castellanos    ED Visit Information     Ed visit date: 02/15/2021  Diagnosis Description: Abdominal pain; Nausea and vomiting  In Network? Yes [facility]  Discharge status: Home  Discharged with meds ? Yes  Number of ED visits to date: 1  ED Severity:NA     Outreach Information    Outreach successful: Yes 3  Date letter mailed: NA  Date Finalized: 02/18/2021    Care Coordination    Follow up appointment with pcp: no no  Transportation issues ?  NA    Value Base Outreach    02/17/2021 12:04 AM - LMOM  02/18/2021 10:32 AM  02/18/2021 1:54 PM

## 2021-04-27 ENCOUNTER — TELEPHONE (OUTPATIENT)
Dept: FAMILY MEDICINE CLINIC | Facility: CLINIC | Age: 66
End: 2021-04-27

## 2021-04-27 DIAGNOSIS — L29.9 ITCHY SCALP: Primary | ICD-10-CM

## 2021-04-27 RX ORDER — HYDROXYZINE HYDROCHLORIDE 25 MG/1
25 TABLET, FILM COATED ORAL 2 TIMES DAILY PRN
Qty: 30 TABLET | Refills: 1 | Status: SHIPPED | OUTPATIENT
Start: 2021-04-27 | End: 2022-05-06 | Stop reason: ALTCHOICE

## 2021-04-27 NOTE — TELEPHONE ENCOUNTER
Hydroxyzine 25 mg BID- to  [facility] which is on the discontinued med list  She is requesting this [facility] in [place]

## 2021-05-17 NOTE — Clinical Note
Tiana Morales was seen and treated in our emergency department on 2/15/2021  Diagnosis:     Lucius Farzana    She may return on this date:     Please excuse from work for 1 day  If you have any questions or concerns, please don't hesitate to call        Nasra Robles DO    ______________________________           _______________          _______________  Hospital Representative                              Date                                Time
28.6

## 2021-11-26 ENCOUNTER — HOSPITAL ENCOUNTER (EMERGENCY)
Facility: HOSPITAL | Age: 66
Discharge: HOME | End: 2021-11-27
Attending: EMERGENCY MEDICINE

## 2021-11-26 DIAGNOSIS — K52.9 GASTROENTERITIS: Primary | ICD-10-CM

## 2021-11-26 PROCEDURE — 99285 EMERGENCY DEPT VISIT HI MDM: CPT | Performed by: EMERGENCY MEDICINE

## 2021-11-26 PROCEDURE — 93005 ELECTROCARDIOGRAM TRACING: CPT

## 2021-11-26 PROCEDURE — 99284 EMERGENCY DEPT VISIT MOD MDM: CPT

## 2021-11-27 VITALS
OXYGEN SATURATION: 95 % | HEART RATE: 78 BPM | BODY MASS INDEX: 26.15 KG/M2 | WEIGHT: 172 LBS | TEMPERATURE: 96.8 F | SYSTOLIC BLOOD PRESSURE: 154 MMHG | RESPIRATION RATE: 17 BRPM | DIASTOLIC BLOOD PRESSURE: 69 MMHG

## 2021-11-27 LAB
2HR DELTA HS TROPONIN: 3 NG/L
ALBUMIN SERPL BCP-MCNC: 3.8 G/DL (ref 3.5–5)
ALP SERPL-CCNC: 112 U/L (ref 46–116)
ALT SERPL W P-5'-P-CCNC: 40 U/L (ref 12–78)
ANION GAP SERPL CALCULATED.3IONS-SCNC: 10 MMOL/L (ref 4–13)
AST SERPL W P-5'-P-CCNC: 44 U/L (ref 5–45)
BASOPHILS # BLD AUTO: 0.06 THOUSANDS/ΜL (ref 0–0.1)
BASOPHILS NFR BLD AUTO: 1 % (ref 0–1)
BILIRUB SERPL-MCNC: 0.32 MG/DL (ref 0.2–1)
BUN SERPL-MCNC: 19 MG/DL (ref 5–25)
CALCIUM SERPL-MCNC: 9.1 MG/DL (ref 8.3–10.1)
CARDIAC TROPONIN I PNL SERPL HS: 5 NG/L
CARDIAC TROPONIN I PNL SERPL HS: 8 NG/L
CHLORIDE SERPL-SCNC: 108 MMOL/L (ref 100–108)
CO2 SERPL-SCNC: 28 MMOL/L (ref 21–32)
CREAT SERPL-MCNC: 0.97 MG/DL (ref 0.6–1.3)
EOSINOPHIL # BLD AUTO: 0.28 THOUSAND/ΜL (ref 0–0.61)
EOSINOPHIL NFR BLD AUTO: 3 % (ref 0–6)
ERYTHROCYTE [DISTWIDTH] IN BLOOD BY AUTOMATED COUNT: 13.4 % (ref 11.6–15.1)
GFR SERPL CREATININE-BSD FRML MDRD: 71 ML/MIN/1.73SQ M
GLUCOSE SERPL-MCNC: 155 MG/DL (ref 65–140)
HCT VFR BLD AUTO: 35.4 % (ref 34.8–46.1)
HGB BLD-MCNC: 11.5 G/DL (ref 11.5–15.4)
IMM GRANULOCYTES # BLD AUTO: 0.02 THOUSAND/UL (ref 0–0.2)
IMM GRANULOCYTES NFR BLD AUTO: 0 % (ref 0–2)
LIPASE SERPL-CCNC: 126 U/L (ref 73–393)
LYMPHOCYTES # BLD AUTO: 4.34 THOUSANDS/ΜL (ref 0.6–4.47)
LYMPHOCYTES NFR BLD AUTO: 45 % (ref 14–44)
MCH RBC QN AUTO: 26.7 PG (ref 26.8–34.3)
MCHC RBC AUTO-ENTMCNC: 32.5 G/DL (ref 31.4–37.4)
MCV RBC AUTO: 82 FL (ref 82–98)
MONOCYTES # BLD AUTO: 0.71 THOUSAND/ΜL (ref 0.17–1.22)
MONOCYTES NFR BLD AUTO: 7 % (ref 4–12)
NEUTROPHILS # BLD AUTO: 4.21 THOUSANDS/ΜL (ref 1.85–7.62)
NEUTS SEG NFR BLD AUTO: 44 % (ref 43–75)
NRBC BLD AUTO-RTO: 0 /100 WBCS
PLATELET # BLD AUTO: 226 THOUSANDS/UL (ref 149–390)
PMV BLD AUTO: 11.1 FL (ref 8.9–12.7)
POTASSIUM SERPL-SCNC: 3.9 MMOL/L (ref 3.5–5.3)
PROT SERPL-MCNC: 7.9 G/DL (ref 6.4–8.2)
RBC # BLD AUTO: 4.31 MILLION/UL (ref 3.81–5.12)
SODIUM SERPL-SCNC: 146 MMOL/L (ref 136–145)
WBC # BLD AUTO: 9.62 THOUSAND/UL (ref 4.31–10.16)

## 2021-11-27 PROCEDURE — 84484 ASSAY OF TROPONIN QUANT: CPT | Performed by: EMERGENCY MEDICINE

## 2021-11-27 PROCEDURE — 96361 HYDRATE IV INFUSION ADD-ON: CPT

## 2021-11-27 PROCEDURE — 36415 COLL VENOUS BLD VENIPUNCTURE: CPT | Performed by: EMERGENCY MEDICINE

## 2021-11-27 PROCEDURE — 85025 COMPLETE CBC W/AUTO DIFF WBC: CPT | Performed by: EMERGENCY MEDICINE

## 2021-11-27 PROCEDURE — 83690 ASSAY OF LIPASE: CPT | Performed by: EMERGENCY MEDICINE

## 2021-11-27 PROCEDURE — 96374 THER/PROPH/DIAG INJ IV PUSH: CPT

## 2021-11-27 PROCEDURE — 80053 COMPREHEN METABOLIC PANEL: CPT | Performed by: EMERGENCY MEDICINE

## 2021-11-27 RX ORDER — ONDANSETRON 4 MG/1
4 TABLET, ORALLY DISINTEGRATING ORAL EVERY 6 HOURS PRN
Qty: 20 TABLET | Refills: 0 | Status: SHIPPED | OUTPATIENT
Start: 2021-11-27 | End: 2022-05-06 | Stop reason: ALTCHOICE

## 2021-11-27 RX ORDER — ONDANSETRON 2 MG/ML
4 INJECTION INTRAMUSCULAR; INTRAVENOUS ONCE
Status: COMPLETED | OUTPATIENT
Start: 2021-11-27 | End: 2021-11-27

## 2021-11-27 RX ADMIN — SODIUM CHLORIDE 1000 ML: 0.9 INJECTION, SOLUTION INTRAVENOUS at 00:23

## 2021-11-27 RX ADMIN — ONDANSETRON 4 MG: 2 INJECTION INTRAMUSCULAR; INTRAVENOUS at 00:17

## 2021-11-28 LAB
ATRIAL RATE: 56 BPM
P AXIS: 40 DEGREES
PR INTERVAL: 148 MS
QRS AXIS: 22 DEGREES
QRSD INTERVAL: 86 MS
QT INTERVAL: 450 MS
QTC INTERVAL: 434 MS
T WAVE AXIS: 33 DEGREES
VENTRICULAR RATE: 56 BPM

## 2021-11-28 PROCEDURE — 93010 ELECTROCARDIOGRAM REPORT: CPT | Performed by: INTERNAL MEDICINE

## 2021-11-29 ENCOUNTER — VBI (OUTPATIENT)
Dept: ADMINISTRATIVE | Facility: OTHER | Age: 66
End: 2021-11-29

## 2022-03-07 DIAGNOSIS — M25.519 SHOULDER PAIN, UNSPECIFIED CHRONICITY, UNSPECIFIED LATERALITY: Primary | ICD-10-CM

## 2022-04-18 ENCOUNTER — TELEPHONE (OUTPATIENT)
Dept: FAMILY MEDICINE CLINIC | Facility: CLINIC | Age: 67
End: 2022-04-18

## 2022-04-18 DIAGNOSIS — R79.89 ELEVATED BRAIN NATRIURETIC PEPTIDE (BNP) LEVEL: Primary | ICD-10-CM

## 2022-04-18 NOTE — TELEPHONE ENCOUNTER
Patient stopped by the office  She couldn't print the labs from her insurance physical    Her BNP was 288  She will see cardiology tomorrow    No current symptoms    Courtney Chester, DO

## 2022-04-25 ENCOUNTER — CONSULT (OUTPATIENT)
Dept: CARDIOLOGY CLINIC | Facility: CLINIC | Age: 67
End: 2022-04-25

## 2022-04-25 VITALS
BODY MASS INDEX: 26.52 KG/M2 | WEIGHT: 175 LBS | OXYGEN SATURATION: 100 % | HEART RATE: 58 BPM | DIASTOLIC BLOOD PRESSURE: 78 MMHG | SYSTOLIC BLOOD PRESSURE: 122 MMHG | TEMPERATURE: 98.4 F | HEIGHT: 68 IN

## 2022-04-25 DIAGNOSIS — R79.89 ELEVATED BRAIN NATRIURETIC PEPTIDE (BNP) LEVEL: ICD-10-CM

## 2022-04-25 PROCEDURE — 93000 ELECTROCARDIOGRAM COMPLETE: CPT | Performed by: INTERNAL MEDICINE

## 2022-04-25 PROCEDURE — 99243 OFF/OP CNSLTJ NEW/EST LOW 30: CPT | Performed by: INTERNAL MEDICINE

## 2022-04-25 NOTE — PROGRESS NOTES
[facility]  [address]   [place], [address]    Cardiology Consultation    Marlo Ho  [id number]  1955      Consult for:  Abnormal BNP  Appreciate consult by: Power Gutierrez DO      Discussion/Summary:     1  Elevated brain natriuretic peptide (BNP) level  Ambulatory referral to Cardiology    POCT ECG    Echo complete w/ contrast if indicated    NT-BNP PRO      Patient with abnormal BNP level which is of no clinical significance  Will repeat BNP to rule out false positive  2D echocardiogram ordered  Discussed symptoms of congestive heart failure including shortness of breath, lower extremity edema, orthopnea or paroxysmal nocturnal dyspnea  Patient has none of the symptoms but she should monitor for anything in the future  Blood pressure is normal today - she is not on any medications  She had lipid panel done as part of insurance physical   She will print out results and bring them to our office or Dr Juan Carlos Hooper office  HPI:     Marlo Ho is a 61 y o  here for evaluation of abnormal blood work  She was applying for insurance and as part of physical, she had BNP done  BNP was minimally elevated at 288  She has no history of congestive heart failure  She does not have any symptoms such as shortness of breath, lower extremity edema, orthopnea or paroxysmal nocturnal dyspnea  She denies any palpitations, syncope or near syncope  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  She is very active at work and does not have any symptoms with exertion          Past Medical History:   Diagnosis Date    Asymptomatic bacteriuria 4/9/2020    Chest tightness 3/31/2020    COVID-19 virus infection 4/7/2020    H/O antinuclear antibodies     Hallucinations 4/8/2020    Pneumonia due to COVID-19 virus 4/9/2020    SIRS (systemic inflammatory response syndrome) ([facility] [address] ) 3/31/2020    Urinary tract infection     last assessed: 10/24/2013     Social History     Tobacco Use    Smoking status: Never Smoker    Smokeless tobacco: Never Used   Vaping Use    Vaping Use: Never used   Substance Use Topics    Alcohol use: Not Currently    Drug use: Not Currently      Family History   Problem Relation Age of Onset    Diabetes Mother      Past Surgical History:   Procedure Laterality Date    APPENDECTOMY         Current Outpatient Medications:     ascorbic acid (VITAMIN C) 1000 MG tablet, Take 1 tablet (1,000 mg total) by mouth 2 (two) times a day, Disp: 4 tablet, Rfl: 0    albuterol (ProAir HFA) 90 mcg/act inhaler, Inhale 2 puffs every 4 (four) hours as needed for wheezing (Patient not taking: Reported on 2/15/2021), Disp: 1 Inhaler, Rfl: 1    ciclopirox (PENLAC) 8 % solution, Apply topically daily at bedtime (Patient not taking: Reported on 2/15/2021), Disp: 6 6 mL, Rfl: 0    famotidine (PEPCID) 20 mg tablet, Take 1 tablet (20 mg total) by mouth 2 (two) times a day for 14 days, Disp: 28 tablet, Rfl: 0    hydrOXYzine HCL (ATARAX) 25 mg tablet, Take 1 tablet (25 mg total) by mouth 2 (two) times a day as needed for itching (Patient not taking: Reported on 4/25/2022 ), Disp: 30 tablet, Rfl: 1    ondansetron (Zofran ODT) 4 mg disintegrating tablet, Take 1 tablet (4 mg total) by mouth every 6 (six) hours as needed for nausea or vomiting (Patient not taking: Reported on 4/25/2022 ), Disp: 20 tablet, Rfl: 0    ondansetron (ZOFRAN-ODT) 4 mg disintegrating tablet, Take 1 tablet (4 mg total) by mouth every 6 (six) hours as needed for nausea or vomiting for up to 15 doses (Patient not taking: Reported on 4/25/2022 ), Disp: 15 tablet, Rfl: 0    zinc sulfate (ZINCATE) 220 mg capsule, Take 1 capsule (220 mg total) by mouth daily (Patient not taking: Reported on 4/25/2022 ), Disp: 2 capsule, Rfl: 0  No Known Allergies    Review of Systems:   Review of Systems   Respiratory: Negative for shortness of breath      Cardiovascular: Negative for chest pain, palpitations and leg swelling  All other systems reviewed and are negative  Physical Examination:     Vitals:    04/25/22 1508   BP: 122/78   BP Location: Right arm   Patient Position: Sitting   Cuff Size: Standard   Pulse: 58   Temp: 98 4 °F (36 9 °C)   SpO2: 100%   Weight: 79 4 kg (175 lb)   Height: 5' 8" (1 727 m)       Physical Exam   Constitutional: She appears healthy  No distress  Eyes: Pupils are equal, round, and reactive to light  Conjunctivae are normal    Neck: No JVD present  Cardiovascular: Normal rate, regular rhythm and normal heart sounds  Exam reveals no gallop and no friction rub  No murmur heard  Pulmonary/Chest: Effort normal and breath sounds normal  She has no wheezes  She has no rales  Musculoskeletal:         General: No tenderness, deformity or edema  Cervical back: Normal range of motion and neck supple  Neurological: She is alert and oriented to person, place, and time  Skin: Skin is warm and dry          Labs:     Lab Results   Component Value Date    WBC 9 62 11/27/2021    HGB 11 5 11/27/2021    HCT 35 4 11/27/2021    MCV 82 11/27/2021    RDW 13 4 11/27/2021     11/27/2021     BMP:  Lab Results   Component Value Date    SODIUM 146 (H) 11/27/2021    K 3 9 11/27/2021     11/27/2021    CO2 28 11/27/2021    BUN 19 11/27/2021    CREATININE 0 97 11/27/2021    GLUC 155 (H) 11/27/2021    CALCIUM 9 1 11/27/2021    CORRECTEDCA 9 8 02/15/2021    EGFR 71 11/27/2021    MG 2 6 04/08/2020     LFT:  Lab Results   Component Value Date    AST 44 11/27/2021    ALT 40 11/27/2021    ALKPHOS 112 11/27/2021    TP 7 9 11/27/2021    ALB 3 8 11/27/2021      Lab Results   Component Value Date    [id number] 0 804 04/07/2020     Lab Results   Component Value Date    HGBA1C 5 8 (H) 04/01/2020     Lipid Profile:   Lab Results   Component Value Date    CHOLESTEROL 145 04/01/2020    HDL 45 04/01/2020    LDLCALC 87 04/01/2020    TRIG 67 04/01/2020     Lab Results   Component Value Date    CHOLESTEROL 145 2020     Lab Results   Component Value Date    TROPONINI <0 02 02/15/2021     No results found for: NTBNP   No results found for this or any previous visit (from the past 672 hour(s))  Imaging & Testing   I have personally reviewed pertinent reports  Cardiac Testing   Results for orders placed during the hospital encounter of 20    Echo complete with contrast if indicated    Narrative  [address]  [address] [facility]  [place]   [phone]    Transthoracic Echocardiogram  2D, M-mode, Doppler, and Color Doppler    Study date:  31-Mar-2020    Patient: Ambrosio Mills  MR number: [id number]  Account number: [de-identified]  : 1955  Age: 60 years  Gender: Female  Status: Inpatient  Location: Bedside  Height: 67 in  Weight: 175 6 lb  BP: 128/ 82 mmHg    Indications: TIA    Diagnoses: G45 9 - Transient cerebral ischemic attack, unspecified    Sonographer:  PINA Dugan  Primary Physician:  Latonya Kent DO  Referring Physician:  Paula Meneses MD  Group:  [facility]  Interpreting Physician:  Josseline Zabala DO    IMPRESSIONS:  Mild concentric left ventricular hypertrophy with normal ejection fraction  Grade 1 diastolic dysfunction  Grossly normal valve function with mild tricuspid regurgitation  Normal pulmonary artery systolic pressure  Normal bubble study with no evidence of right-to-left shunt with agitated saline injection  HISTORY: PRIOR HISTORY: Sickle Cell Trait, GERD    PROCEDURE: The procedure was performed at the bedside  This was a routine study  The transthoracic approach was used  The study included complete 2D imaging, M-mode, complete spectral Doppler, and color Doppler  The heart rate was 89 bpm,  at the start of the study  Intravenous contrast (agitated saline, 10 ml) was administered to evaluate shunting  Intravenous contrast ( 10 ml) was administered   Echocardiographic views were limited due to restricted patient mobility, poor  patient compliance, and poor acoustic window availability  This was a technically difficult study  LEFT VENTRICLE: Size was normal  Systolic function was normal by visual assessment  Ejection fraction was estimated in the range of 55 % to 60 %  There were no regional wall motion abnormalities  There was mild concentric hypertrophy  DOPPLER: Doppler parameters were consistent with abnormal left ventricular relaxation (grade 1 diastolic dysfunction)  RIGHT VENTRICLE: The size was normal  Systolic function was normal  DOPPLER: Systolic pressure was within the normal range  LEFT ATRIUM: Size was normal  No thrombus was identified  ATRIAL SEPTUM: No defect or patent foramen ovale was identified  Contrast injection was performed  There was no right-to-left shunt, in the baseline state  RIGHT ATRIUM: Size was normal     MITRAL VALVE: Valve structure was normal  There was normal leaflet separation  No echocardiographic evidence for prolapse  DOPPLER: The transmitral velocity was within the normal range  There was no evidence for stenosis  There was no  regurgitation  AORTIC VALVE: The valve was trileaflet  Leaflets exhibited normal thickness and normal cuspal separation  DOPPLER: Transaortic velocity was within the normal range  There was no evidence for stenosis  There was no regurgitation  TRICUSPID VALVE: The valve structure was normal  There was normal leaflet separation  DOPPLER: The transtricuspid velocity was within the normal range  There was mild regurgitation  Pulmonary artery systolic pressure was within the normal  range  Estimated peak PA pressure was 32 mmHg  PULMONIC VALVE: Leaflets exhibited normal thickness, no calcification, and normal cuspal separation  DOPPLER: The transpulmonic velocity was within the normal range  There was no regurgitation  PERICARDIUM: There was no thickening  There was no pericardial effusion      AORTA: The root exhibited normal size  PULMONARY ARTERY: The size was normal  The morphology appeared normal     SYSTEM MEASUREMENT TABLES    2D mode  AoR Diam 2D: 2 6 cm  LA Diam (2D): 2 6 cm  LA/Ao (2D): 1  FS (2D Teich): 28 3 %  IVSd (2D): 1 23 cm  LVDEV: 81 7 cmï¾³  LVESV: 36 7 cmï¾³  LVIDd(2D): 4 27 cm  LVISd (2D): 3 06 cm  LVPWd (2D): 1 05 cm  SV (Teich): 45 cmï¾³    Apical four chamber  LVEF A4C: 57 %    Unspecified Scan Mode  MV Peak A Travis: 818 mm/s  MV Peak E Travis  Mean: 534 mm/s  MVA (PHT): 3 67 cmï¾²  PHT: 60 ms  Max P mm[Hg]  V Max: 2430 mm/s  Vmax: 2430 mm/s  RA Area: 14 7 cmï¾²  RA Volume: 33 2 cmï¾³  TAPSE: 1 9 cm    [facility] Accredited Echocardiography Laboratory    Prepared and electronically signed by    Ferdinand Wilkes DO  Signed 31-Mar-2020 15:59:52      EKG: Personally reviewed  Normal ECG with Q-wave in lead [address] [facility] [place]  [phone]  Please call with any questions

## 2022-05-02 PROBLEM — E87.6 HYPOKALEMIA: Status: RESOLVED | Noted: 2020-04-07 | Resolved: 2022-05-02

## 2022-05-02 PROBLEM — E87.1 HYPONATREMIA: Status: RESOLVED | Noted: 2020-04-07 | Resolved: 2022-05-02

## 2022-05-02 PROBLEM — M12.9 ARTHRITIS, MULTIPLE JOINT INVOLVEMENT: Status: RESOLVED | Noted: 2020-11-06 | Resolved: 2022-05-02

## 2022-05-02 PROBLEM — R42 DIZZINESS: Status: RESOLVED | Noted: 2020-03-31 | Resolved: 2022-05-02

## 2022-05-04 ENCOUNTER — HOSPITAL ENCOUNTER (OUTPATIENT)
Dept: NON INVASIVE DIAGNOSTICS | Facility: HOSPITAL | Age: 67
Discharge: HOME | End: 2022-05-04
Attending: INTERNAL MEDICINE

## 2022-05-04 VITALS
HEIGHT: 68 IN | WEIGHT: 175 LBS | BODY MASS INDEX: 26.52 KG/M2 | SYSTOLIC BLOOD PRESSURE: 122 MMHG | HEART RATE: 68 BPM | DIASTOLIC BLOOD PRESSURE: 78 MMHG

## 2022-05-04 DIAGNOSIS — R79.89 ELEVATED BRAIN NATRIURETIC PEPTIDE (BNP) LEVEL: ICD-10-CM

## 2022-05-04 LAB
AORTIC ROOT: 2.7 CM
AORTIC VALVE MEAN VELOCITY: 10.7 M/S
APICAL FOUR CHAMBER EJECTION FRACTION: 60 %
AV LVOT MEAN GRADIENT: 2 MMHG
AV LVOT PEAK GRADIENT: 4 MMHG
AV MEAN GRADIENT: 5 MMHG
AV PEAK GRADIENT: 9 MMHG
AV VELOCITY RATIO: 0.64
DOP CALC AO PEAK VEL: 1.51 M/S
DOP CALC AO VTI: 31.26 CM
DOP CALC LVOT PEAK VEL VTI: 20.93 CM
DOP CALC LVOT PEAK VEL: 0.96 M/S
E WAVE DECELERATION TIME: 268 MS
FRACTIONAL SHORTENING: 30 % (ref 28–44)
INTERVENTRICULAR SEPTUM IN DIASTOLE (PARASTERNAL SHORT AXIS VIEW): 1.1 CM
INTERVENTRICULAR SEPTUM: 1.1 CM (ref 0.53–0.99)
LAAS-AP2: 19.8 CM2
LAAS-AP4: 23.1 CM2
LEFT ATRIUM AREA SYSTOLE SINGLE PLANE A4C: 21.2 CM2
LEFT ATRIUM SIZE: 3.8 CM
LEFT INTERNAL DIMENSION IN SYSTOLE: 3 CM (ref 2.83–4.28)
LEFT VENTRICULAR INTERNAL DIMENSION IN DIASTOLE: 4.3 CM (ref 4.64–6.91)
LEFT VENTRICULAR POSTERIOR WALL IN END DIASTOLE: 1 CM (ref 0.52–0.98)
LEFT VENTRICULAR STROKE VOLUME: 50 ML
LVSV (TEICH): 50 ML
MV E'TISSUE VEL-LAT: 7 CM/S
MV E'TISSUE VEL-SEP: 5 CM/S
MV PEAK A VEL: 1.27 M/S
MV PEAK E VEL: 59 CM/S
MV STENOSIS PRESSURE HALF TIME: 78 MS
MV VALVE AREA P 1/2 METHOD: 2.82 CM2
RIGHT ATRIUM AREA SYSTOLE A4C: 13.5 CM2
RIGHT VENTRICLE ID DIMENSION: 2.9 CM
SL CV LEFT ATRIUM LENGTH A2C: 5.6 CM
SL CV PED ECHO LEFT VENTRICLE DIASTOLIC VOLUME (MOD BIPLANE) 2D: 85 ML
SL CV PED ECHO LEFT VENTRICLE SYSTOLIC VOLUME (MOD BIPLANE) 2D: 35 ML
TR MAX PG: 23 MMHG
TR PEAK VELOCITY: 2.4 M/S
TRICUSPID VALVE PEAK REGURGITATION VELOCITY: 2.39 M/S
Z-SCORE OF INTERVENTRICULAR SEPTUM IN END DIASTOLE: 2.87
Z-SCORE OF LEFT VENTRICULAR DIMENSION IN END DIASTOLE: -2.75
Z-SCORE OF LEFT VENTRICULAR DIMENSION IN END SYSTOLE: -1.17
Z-SCORE OF LEFT VENTRICULAR POSTERIOR WALL IN END DIASTOLE: 2.14

## 2022-05-04 PROCEDURE — 93306 TTE W/DOPPLER COMPLETE: CPT | Performed by: INTERNAL MEDICINE

## 2022-05-04 PROCEDURE — 93306 TTE W/DOPPLER COMPLETE: CPT

## 2022-05-05 ENCOUNTER — TELEPHONE (OUTPATIENT)
Dept: CARDIOLOGY CLINIC | Facility: CLINIC | Age: 67
End: 2022-05-05

## 2022-05-06 ENCOUNTER — OFFICE VISIT (OUTPATIENT)
Dept: FAMILY MEDICINE CLINIC | Facility: CLINIC | Age: 67
End: 2022-05-06

## 2022-05-06 ENCOUNTER — TELEPHONE (OUTPATIENT)
Dept: CARDIOLOGY CLINIC | Facility: CLINIC | Age: 67
End: 2022-05-06

## 2022-05-06 VITALS
SYSTOLIC BLOOD PRESSURE: 126 MMHG | RESPIRATION RATE: 16 BRPM | BODY MASS INDEX: 26.37 KG/M2 | OXYGEN SATURATION: 99 % | HEIGHT: 68 IN | WEIGHT: 174 LBS | TEMPERATURE: 97.3 F | HEART RATE: 69 BPM | DIASTOLIC BLOOD PRESSURE: 80 MMHG

## 2022-05-06 DIAGNOSIS — Z00.00 ANNUAL PHYSICAL EXAM: Primary | ICD-10-CM

## 2022-05-06 DIAGNOSIS — Z12.31 SCREENING MAMMOGRAM, ENCOUNTER FOR: ICD-10-CM

## 2022-05-06 DIAGNOSIS — Z23 NEED FOR VACCINATION: ICD-10-CM

## 2022-05-06 PROCEDURE — 99396 PREV VISIT EST AGE 40-64: CPT | Performed by: FAMILY MEDICINE

## 2022-05-06 PROCEDURE — 90715 TDAP VACCINE 7 YRS/> IM: CPT

## 2022-05-06 PROCEDURE — 90471 IMMUNIZATION ADMIN: CPT

## 2022-05-06 NOTE — PROGRESS NOTES
FAMILY PRACTICE HEALTH MAINTENANCE OFFICE VISIT  [facility]    NAME: Krisat Man  AGE: 61 y o  SEX: female  : 1955     DATE: 2022    Assessment and Plan     1  Annual physical exam    2  Need for vaccination  -     TDAP VACCINE GREATER THAN OR EQUAL TO 6YO IM    3  Screening mammogram, encounter for  -     Mammo screening bilateral w 3d & cad; Future; Expected date: 2022      Patient Counseling:   Nutrition: Stressed importance of a well balanced diet, moderation of sodium/saturated fat, caloric balance and sufficient intake of fiber  Exercise: Stressed the importance of regular exercise with a goal of 150 minutes per week  Dental Health: Discussed daily flossing and brushing and regular dental visits     Immunizations reviewed: See Orders  Discussed benefits of:  Colon Cancer Screening, Mammogram  and Screening labs  Declined pap smear today  Recommended that she get one done  BMI Counseling: Body mass index is 26 46 kg/m²  Discussed with patient's BMI with her  The BMI is above normal  Exercise recommendations include exercising 3-5 times per week  Return in about 1 year (around 2023) for Next scheduled follow up, Annual physical         Chief Complaint     Chief Complaint   Patient presents with    Physical Exam     [id number]        History of Present Illness     She is planning on going part time next year       Had lab work done for her life insurance plan  Hemoglobin A1c was 5 7  Fasting blood sugar was normal at 92  Liver enzymes are normal   kidney function is normal  Total cholesterol is normal at 187  HDL is excellent at 79      Well Adult Physical   Patient here for a comprehensive physical exam       Diet and Physical Activity  Diet: well balanced diet  Exercise: frequently      Depression Screen  PHQ-2/9 Depression Screening    Little interest or pleasure in doing things: 0 - not at all  Feeling down, depressed, or hopeless: 0 - not at all  PHQ-2 Score: 0  PHQ-2 Interpretation: Negative depression screen          General Health  Hearing: Normal:  bilateral  Vision: no vision problems  Dental: no dental visits for >1 year    Reproductive Health  Post-menopausal       The following portions of the patient's history were reviewed and updated as appropriate: allergies, current medications, past family history, past medical history, past social history, past surgical history and problem list     Review of Systems     Review of Systems   Constitutional: Negative  Respiratory: Negative  Cardiovascular: Negative          Past Medical History     Past Medical History:   Diagnosis Date    Asymptomatic bacteriuria 4/9/2020    Chest tightness 3/31/2020    COVID-19 virus infection 4/7/2020    H/O antinuclear antibodies     Hallucinations 4/8/2020    Pneumonia due to COVID-19 virus 4/9/2020    [facility]    Urinary tract infection     last assessed: 10/24/2013       Past Surgical History     Past Surgical History:   Procedure Laterality Date    APPENDECTOMY         Social History     Social History     Socioeconomic History    Marital status: Single     Spouse name: None    Number of children: None    Years of education: None    Highest education level: None   Occupational History    None   Tobacco Use    Smoking status: Never Smoker    Smokeless tobacco: Never Used   Vaping Use    Vaping Use: Never used   Substance and Sexual Activity    Alcohol use: Not Currently    Drug use: Not Currently    Sexual activity: Not Currently     Partners: Male   Other Topics Concern    None   Social History Narrative    None     Social Determinants of Health     Financial Resource Strain: Not on file   Food Insecurity: Not on file   Transportation Needs: Not on file   Physical Activity: Not on file   Stress: Not on file   Social Connections: Not on file   Intimate Partner Violence: Not on file Housing Stability: Not on file       Family History     Family History   Problem Relation Age of Onset    Diabetes Mother        Current Medications     No current outpatient medications on file  Allergies     No Known Allergies    Objective     /80   Pulse 69   Temp (!) 97 3 °F (36 3 °C)   Resp 16   Ht 5' 8" (1 727 m)   Wt 78 9 kg (174 lb)   SpO2 99%   BMI 26 46 kg/m²      Physical Exam  Vitals and nursing note reviewed  Constitutional:       Appearance: She is well-developed  HENT:      Head: Normocephalic and atraumatic  Right Ear: Tympanic membrane and external ear normal       Left Ear: Tympanic membrane and external ear normal    Cardiovascular:      Rate and Rhythm: Normal rate and regular rhythm  Heart sounds: Normal heart sounds  No murmur heard  No friction rub  Pulmonary:      Effort: Pulmonary effort is normal  No respiratory distress  Breath sounds: Normal breath sounds  No wheezing or rales  Abdominal:      General: There is no distension  Palpations: Abdomen is soft  There is no mass  Tenderness: There is no abdominal tenderness  There is no guarding or rebound  Musculoskeletal:      Cervical back: Normal range of motion  Right lower leg: No edema  Left lower leg: No edema  Skin:     General: Skin is warm  Neurological:      Mental Status: She is alert and oriented to person, place, and time     Psychiatric:         Mood and Affect: Mood normal             Visual Acuity Screening    Right eye Left eye Both eyes   Without correction: 20/30 20/70 20/25   With correction:              Jameel Orellana, [address]

## 2022-05-06 NOTE — TELEPHONE ENCOUNTER
----- Message from Bisi Weiner DO sent at 5/6/2022  9:32 AM EDT -----  Can you please let the patient know her echocardiogram was normal  - heart is strong

## 2022-08-10 ENCOUNTER — APPOINTMENT (EMERGENCY)
Dept: RADIOLOGY | Facility: HOSPITAL | Age: 67
End: 2022-08-10
Payer: COMMERCIAL

## 2022-08-10 ENCOUNTER — HOSPITAL ENCOUNTER (EMERGENCY)
Facility: HOSPITAL | Age: 67
Discharge: HOME | End: 2022-08-10
Attending: EMERGENCY MEDICINE | Admitting: EMERGENCY MEDICINE
Payer: COMMERCIAL

## 2022-08-10 VITALS
OXYGEN SATURATION: 99 % | SYSTOLIC BLOOD PRESSURE: 170 MMHG | RESPIRATION RATE: 22 BRPM | DIASTOLIC BLOOD PRESSURE: 71 MMHG | HEART RATE: 62 BPM | TEMPERATURE: 98 F

## 2022-08-10 DIAGNOSIS — M25.559 HIP PAIN: Primary | ICD-10-CM

## 2022-08-10 LAB
ALBUMIN SERPL BCP-MCNC: 3.9 G/DL (ref 3.5–5)
ALP SERPL-CCNC: 118 U/L (ref 46–116)
ALT SERPL W P-5'-P-CCNC: 43 U/L (ref 12–78)
ANION GAP SERPL CALCULATED.3IONS-SCNC: 7 MMOL/L (ref 4–13)
AST SERPL W P-5'-P-CCNC: 16 U/L (ref 5–45)
BACTERIA UR QL AUTO: NORMAL /HPF
BASOPHILS # BLD AUTO: 0.04 THOUSANDS/ΜL (ref 0–0.1)
BASOPHILS NFR BLD AUTO: 1 % (ref 0–1)
BILIRUB SERPL-MCNC: 0.29 MG/DL (ref 0.2–1)
BILIRUB UR QL STRIP: NEGATIVE
BUN SERPL-MCNC: 11 MG/DL (ref 5–25)
CALCIUM SERPL-MCNC: 9.1 MG/DL (ref 8.3–10.1)
CHLORIDE SERPL-SCNC: 106 MMOL/L (ref 96–108)
CLARITY UR: CLEAR
CO2 SERPL-SCNC: 29 MMOL/L (ref 21–32)
COLOR UR: ABNORMAL
CREAT SERPL-MCNC: 0.85 MG/DL (ref 0.6–1.3)
EOSINOPHIL # BLD AUTO: 0.1 THOUSAND/ΜL (ref 0–0.61)
EOSINOPHIL NFR BLD AUTO: 1 % (ref 0–6)
ERYTHROCYTE [DISTWIDTH] IN BLOOD BY AUTOMATED COUNT: 13.9 % (ref 11.6–15.1)
GFR SERPL CREATININE-BSD FRML MDRD: 72 ML/MIN/1.73SQ M
GLUCOSE SERPL-MCNC: 80 MG/DL (ref 65–140)
GLUCOSE UR STRIP-MCNC: NEGATIVE MG/DL
HCT VFR BLD AUTO: 35.3 % (ref 34.8–46.1)
HGB BLD-MCNC: 11.5 G/DL (ref 11.5–15.4)
HGB UR QL STRIP.AUTO: NEGATIVE
IMM GRANULOCYTES # BLD AUTO: 0.02 THOUSAND/UL (ref 0–0.2)
IMM GRANULOCYTES NFR BLD AUTO: 0 % (ref 0–2)
KETONES UR STRIP-MCNC: NEGATIVE MG/DL
LEUKOCYTE ESTERASE UR QL STRIP: ABNORMAL
LIPASE SERPL-CCNC: 114 U/L (ref 73–393)
LYMPHOCYTES # BLD AUTO: 2.86 THOUSANDS/ΜL (ref 0.6–4.47)
LYMPHOCYTES NFR BLD AUTO: 40 % (ref 14–44)
MCH RBC QN AUTO: 26.3 PG (ref 26.8–34.3)
MCHC RBC AUTO-ENTMCNC: 32.6 G/DL (ref 31.4–37.4)
MCV RBC AUTO: 81 FL (ref 82–98)
MONOCYTES # BLD AUTO: 0.58 THOUSAND/ΜL (ref 0.17–1.22)
MONOCYTES NFR BLD AUTO: 8 % (ref 4–12)
NEUTROPHILS # BLD AUTO: 3.6 THOUSANDS/ΜL (ref 1.85–7.62)
NEUTS SEG NFR BLD AUTO: 50 % (ref 43–75)
NITRITE UR QL STRIP: NEGATIVE
NON-SQ EPI CELLS URNS QL MICRO: NORMAL /HPF
NRBC BLD AUTO-RTO: 0 /100 WBCS
PH UR STRIP.AUTO: 6 [PH]
PLATELET # BLD AUTO: 202 THOUSANDS/UL (ref 149–390)
PMV BLD AUTO: 11.2 FL (ref 8.9–12.7)
POTASSIUM SERPL-SCNC: 3.7 MMOL/L (ref 3.5–5.3)
PROT SERPL-MCNC: 8.5 G/DL (ref 6.4–8.4)
PROT UR STRIP-MCNC: NEGATIVE MG/DL
RBC # BLD AUTO: 4.38 MILLION/UL (ref 3.81–5.12)
RBC #/AREA URNS AUTO: NORMAL /HPF
SODIUM SERPL-SCNC: 142 MMOL/L (ref 135–147)
SP GR UR STRIP.AUTO: 1.01 (ref 1–1.03)
UROBILINOGEN UR QL STRIP.AUTO: 0.2 E.U./DL
WBC # BLD AUTO: 7.2 THOUSAND/UL (ref 4.31–10.16)
WBC #/AREA URNS AUTO: NORMAL /HPF

## 2022-08-10 PROCEDURE — 83690 ASSAY OF LIPASE: CPT

## 2022-08-10 PROCEDURE — 85025 COMPLETE CBC W/AUTO DIFF WBC: CPT

## 2022-08-10 PROCEDURE — 72170 X-RAY EXAM OF PELVIS: CPT

## 2022-08-10 PROCEDURE — 36415 COLL VENOUS BLD VENIPUNCTURE: CPT

## 2022-08-10 PROCEDURE — 96374 THER/PROPH/DIAG INJ IV PUSH: CPT

## 2022-08-10 PROCEDURE — 81001 URINALYSIS AUTO W/SCOPE: CPT

## 2022-08-10 PROCEDURE — 99284 EMERGENCY DEPT VISIT MOD MDM: CPT

## 2022-08-10 PROCEDURE — 99285 EMERGENCY DEPT VISIT HI MDM: CPT

## 2022-08-10 PROCEDURE — 80053 COMPREHEN METABOLIC PANEL: CPT

## 2022-08-10 RX ORDER — KETOROLAC TROMETHAMINE 30 MG/ML
15 INJECTION, SOLUTION INTRAMUSCULAR; INTRAVENOUS ONCE
Status: COMPLETED | OUTPATIENT
Start: 2022-08-10 | End: 2022-08-10

## 2022-08-10 RX ADMIN — KETOROLAC TROMETHAMINE 15 MG: 30 INJECTION, SOLUTION INTRAMUSCULAR at 18:55

## 2022-08-10 NOTE — Clinical Note
Mo Veloz was seen and treated in our emergency department on 8/10/2022  No restrictions            Diagnosis:     Daya Solomon    She may return on this date: If you have any questions or concerns, please don't hesitate to call        Willian Giraldo PA-C    ______________________________           _______________          _______________  Hospital Representative                              Date                                Time

## 2022-08-10 NOTE — ED PROVIDER NOTES
History  Chief Complaint   Patient presents with    Abdominal Pain     C/o bilat groin pain for about a week and a half  Sharp pains  R greater than L no vomiting diarrhea or constipation     Patient is a 53-year-old female coming in for bilateral hip pain for the last week and a half  Patient did not have any inciting event, denies any urinary symptoms, nausea, vomiting, abdominal pain, any other symptoms this time  Patient is making the [facility] sign when pointing where her pain is      History provided by:  Patient   used: No    Abdominal Pain  Pain severity:  No pain  Onset quality:  Gradual  Duration:  2 weeks  Chronicity:  New  Associated symptoms: no chest pain, no chills, no cough, no diarrhea, no dysuria, no fatigue, no fever, no flatus, no hematemesis, no hematuria, no nausea, no shortness of breath, no sore throat and no vomiting        None       Past Medical History:   Diagnosis Date    Asymptomatic bacteriuria 4/9/2020    Chest tightness 3/31/2020    COVID-19 virus infection 4/7/2020    H/O antinuclear antibodies     Hallucinations 4/8/2020    Pneumonia due to COVID-19 virus 4/9/2020    [facility] inflammatory response syndrome) [facility] ) 3/31/2020    Urinary tract infection     last assessed: 10/24/2013       Past Surgical History:   Procedure Laterality Date    APPENDECTOMY         Family History   Problem Relation Age of Onset    Diabetes Mother      I have reviewed and agree with the history as documented  E-Cigarette/Vaping    E-Cigarette Use Never User      E-Cigarette/Vaping Substances     Social History     Tobacco Use    Smoking status: Never Smoker    Smokeless tobacco: Never Used   Vaping Use    Vaping Use: Never used   Substance Use Topics    Alcohol use: Not Currently    Drug use: Not Currently       Review of Systems   Constitutional: Negative  Negative for chills, fatigue and fever  HENT: Negative  Negative for sore throat  Eyes: Negative  Respiratory: Negative  Negative for cough and shortness of breath  Cardiovascular: Negative  Negative for chest pain  Gastrointestinal: Positive for abdominal pain  Negative for diarrhea, flatus, hematemesis, nausea and vomiting  Genitourinary: Negative  Negative for dysuria and hematuria  Musculoskeletal: Negative  Skin: Negative  Neurological: Negative  Psychiatric/Behavioral: Negative  Physical Exam  Physical Exam  Vitals reviewed  Constitutional:       Appearance: She is well-developed and normal weight  HENT:      Head: Normocephalic and atraumatic  Right Ear: External ear normal       Left Ear: External ear normal       Nose: Nose normal    Eyes:      Conjunctiva/sclera: Conjunctivae normal    Cardiovascular:      Rate and Rhythm: Normal rate  Pulmonary:      Effort: Pulmonary effort is normal    Abdominal:      Palpations: Abdomen is soft  Tenderness: There is no abdominal tenderness  There is no right CVA tenderness, left CVA tenderness or guarding  Musculoskeletal:         General: No tenderness  Normal range of motion  Cervical back: Normal range of motion  Skin:     General: Skin is warm and dry  Neurological:      Mental Status: She is alert           Vital Signs  ED Triage Vitals [08/10/22 1735]   Temperature Pulse Respirations Blood Pressure SpO2   98 °F (36 7 °C) 62 22 170/71 99 %      Temp Source Heart Rate Source Patient Position - Orthostatic VS BP Location FiO2 (%)   Tympanic Monitor Sitting Right arm --      Pain Score       7           Vitals:    08/10/22 1735   BP: 170/71   Pulse: 62   Patient Position - Orthostatic VS: Sitting         Visual Acuity      ED Medications  Medications   ketorolac (TORADOL) injection 15 mg (15 mg Intravenous Given 8/10/22 1855)       Diagnostic Studies  Results Reviewed     Procedure Component Value Units Date/Time    Comprehensive metabolic panel [[id number]]  (Abnormal) Collected: 08/10/22 1845    Lab Status: Final result Specimen: Blood from Arm, Left Updated: 08/10/22 1910     Sodium 142 mmol/L      Potassium 3 7 mmol/L      Chloride 106 mmol/L      CO2 29 mmol/L      ANION GAP 7 mmol/L      BUN 11 mg/dL      Creatinine 0 85 mg/dL      Glucose 80 mg/dL      Calcium 9 1 mg/dL      AST 16 U/L      ALT 43 U/L      Alkaline Phosphatase 118 U/L      Total Protein 8 5 g/dL      Albumin 3 9 g/dL      Total Bilirubin 0 29 mg/dL      eGFR 72 ml/min/1 73sq m     Narrative:      National Kidney Disease Foundation guidelines for Chronic Kidney Disease (CKD):     Stage 1 with normal or high GFR (GFR > 90 mL/min/1 73 square meters)    Stage 2 Mild CKD (GFR = 60-89 mL/min/1 73 square meters)    Stage 3A Moderate CKD (GFR = 45-59 mL/min/1 73 square meters)    Stage 3B Moderate CKD (GFR = 30-44 mL/min/1 73 square meters)    Stage 4 Severe CKD (GFR = 15-29 mL/min/1 73 square meters)    Stage 5 End Stage CKD (GFR <15 mL/min/1 73 square meters)  Note: GFR calculation is accurate only with a steady state creatinine    Lipase [[id number]]  (Normal) Collected: 08/10/22 1845    Lab Status: Final result Specimen: Blood from Arm, Left Updated: 08/10/22 1910     Lipase 114 u/L     Urine Microscopic [[id number]]  (Normal) Collected: 08/10/22 1848    Lab Status: Final result Specimen: Urine, Other Updated: 08/10/22 1906     RBC, UA None Seen /hpf      WBC, UA 1-2 /hpf      Epithelial Cells Occasional /hpf      Bacteria, UA None Seen /hpf     UA w Reflex to Microscopic w Reflex to Culture [[id number]]  (Abnormal) Collected: 08/10/22 1848    Lab Status: Final result Specimen: Urine, Other Updated: 08/10/22 1855     Color, UA Light Yellow     Clarity, UA Clear     Specific Gravity, UA 1 015     pH, UA 6 0     Leukocytes, UA Small     Nitrite, UA Negative     Protein, UA Negative mg/dl      Glucose, UA Negative mg/dl      Ketones, UA Negative mg/dl      Urobilinogen, UA 0 2 E U /dl      Bilirubin, UA Negative     Occult Blood, UA Negative    CBC and differential [[id number]]  (Abnormal) Collected: 08/10/22 1845    Lab Status: Final result Specimen: Blood from Arm, Left Updated: 08/10/22 1852     WBC 7 20 Thousand/uL      RBC 4 38 Million/uL      Hemoglobin 11 5 g/dL      Hematocrit 35 3 %      MCV 81 fL      MCH 26 3 pg      MCHC 32 6 g/dL      RDW 13 9 %      MPV 11 2 fL      Platelets 649 Thousands/uL      nRBC 0 /100 WBCs      Neutrophils Relative 50 %      Immat GRANS % 0 %      Lymphocytes Relative 40 %      Monocytes Relative 8 %      Eosinophils Relative 1 %      Basophils Relative 1 %      Neutrophils Absolute 3 60 Thousands/µL      Immature Grans Absolute 0 02 Thousand/uL      Lymphocytes Absolute 2 86 Thousands/µL      Monocytes Absolute 0 58 Thousand/µL      Eosinophils Absolute 0 10 Thousand/µL      Basophils Absolute 0 04 Thousands/µL                  XR pelvis ap only 1 or 2 vw   ED Interpretation by Wale Adan PA-C (08/10 1931)   Degenerative changes noted in the hips                 Procedures  Procedures         ED Course                                             MDM  Number of Diagnoses or Management Options  Hip pain: new and does not require workup  Diagnosis management comments: Patient's lab work shows no sign of acute pathology  Patient's x-ray seems to show potential degenerative changes in the hips  I suspect that patient's pain is most likely due to osteoarthritis of the hips, and I given able to referral to Orthopedic surgery    Counseling: I had a detailed discussion with the patient and/or guardian regarding: the historical points, exam findings, and any diagnostic results supporting the discharge diagnosis, lab results, radiology results, discharge instructions reviewed with patient and/or family/caregiver and understanding was verbalized   Instructions given to return to the emergency department if symptoms worsen or persist, or if there are any questions or concerns that arise at home       All labs reviewed and utilized in the medical decision making process     All radiology studies independently viewed by me and interpreted by the radiologist     Portions of the record may have been created with voice recognition software   Occasional wrong word or "sound a like" substitutions may have occurred due to the inherent limitations of voice recognition software   Read the chart carefully and recognize, using context, where substitutions have occurred  Amount and/or Complexity of Data Reviewed  Clinical lab tests: ordered and reviewed  Tests in the radiology section of CPT®: ordered and reviewed    Risk of Complications, Morbidity, and/or Mortality  Presenting problems: low  Diagnostic procedures: minimal  Management options: minimal    Patient Progress  Patient progress: stable      Disposition  Final diagnoses:   Hip pain     Time reflects when diagnosis was documented in both MDM as applicable and the Disposition within this note     Time User Action Codes Description Comment    8/10/2022  7:31 PM Aundrea Melendez [M25 559] Hip pain       ED Disposition     ED Disposition   Discharge    Condition   Stable    Date/Time   Wed Aug 10, 2022  7:31 PM    Comment   Silvino Beatty discharge to home/self care                 Follow-up Information     Follow up With Specialties Details Why Contact Info Additional Robin 18, DO Family Medicine   [address] [place] [address] [address]       [address] [facility] Emergency Department Emergency Medicine  As needed, If symptoms worsen [facility]  [phone] [address] [facility] Emergency Department, [place], [place], [address]          Patient's Medications    No medications on file           PDMP Review     None          ED Provider  Electronically Signed by           Laura Blackburn PA-C  08/10/22 0963

## 2022-10-14 ENCOUNTER — HOSPITAL ENCOUNTER (OUTPATIENT)
Dept: RADIOLOGY | Facility: HOSPITAL | Age: 67
Discharge: HOME | End: 2022-10-14

## 2022-10-14 VITALS — BODY MASS INDEX: 26.52 KG/M2 | WEIGHT: 175 LBS | HEIGHT: 68 IN

## 2022-10-14 DIAGNOSIS — Z12.31 SCREENING MAMMOGRAM, ENCOUNTER FOR: ICD-10-CM

## 2022-10-14 PROCEDURE — 77067 SCR MAMMO BI INCL CAD: CPT

## 2022-10-14 PROCEDURE — 77063 BREAST TOMOSYNTHESIS BI: CPT

## 2022-11-21 ENCOUNTER — OFFICE VISIT (OUTPATIENT)
Dept: FAMILY MEDICINE CLINIC | Facility: CLINIC | Age: 67
End: 2022-11-21

## 2022-11-21 VITALS
HEART RATE: 80 BPM | WEIGHT: 181 LBS | SYSTOLIC BLOOD PRESSURE: 142 MMHG | DIASTOLIC BLOOD PRESSURE: 78 MMHG | OXYGEN SATURATION: 98 % | HEIGHT: 68 IN | RESPIRATION RATE: 16 BRPM | BODY MASS INDEX: 27.43 KG/M2 | TEMPERATURE: 97.2 F

## 2022-11-21 DIAGNOSIS — M25.551 BILATERAL HIP PAIN: Primary | ICD-10-CM

## 2022-11-21 DIAGNOSIS — M25.552 BILATERAL HIP PAIN: Primary | ICD-10-CM

## 2022-11-21 RX ORDER — CELECOXIB 200 MG/1
200 CAPSULE ORAL DAILY
Qty: 30 CAPSULE | Refills: 3 | Status: SHIPPED | OUTPATIENT
Start: 2022-11-21

## 2022-11-21 NOTE — PROGRESS NOTES
Name: Chidi Cohn      : 1955      MRN: [id number]  Encounter Provider: Meek Ferguson DO  Encounter Date: 2022   Encounter department: [address] [place] [address]     1  Bilateral hip pain  Comments:  Noon  Orders:  -     celecoxib (CeleBREX) 200 mg capsule; Take 1 capsule (200 mg total) by mouth daily (do not take Advil while on this medication, may take tylenol)  -     Ambulatory referral to Physical Therapy; Future  she is going to get her pneumonia vaccine at the pharmacy due to cost concerns      Return in about 6 months (around 2023) for Annual physical     Subjective      She has been having pain in her knees and the inside part of her hips  She has been  Taking advil that temporarily helps    It has been acting up off and on for a couple months  Review of Systems    No current outpatient medications on file prior to visit  Objective     /78   Pulse 80   Temp (!) 97 2 °F (36 2 °C)   Resp 16   Ht 5' 8" (1 727 m)   Wt 82 1 kg (181 lb)   SpO2 98%   BMI 27 52 kg/m²     Physical Exam  Vitals and nursing note reviewed  Constitutional:       Appearance: She is well-developed and well-nourished  Cardiovascular:      Rate and Rhythm: Normal rate and regular rhythm  Heart sounds: Normal heart sounds  Pulmonary:      Effort: Pulmonary effort is normal  No respiratory distress  Breath sounds: Normal breath sounds  No wheezing  Musculoskeletal:         General: Tenderness (Bilateral hips common significantly decreased external rotation of both hips) present         Meek Ferguson DO

## 2023-01-17 ENCOUNTER — TELEPHONE (OUTPATIENT)
Dept: FAMILY MEDICINE CLINIC | Facility: CLINIC | Age: 68
End: 2023-01-17

## 2023-01-17 NOTE — TELEPHONE ENCOUNTER
----- Message from Lidia Gaxiola sent at 1/17/2023 12:28 PM EST -----  Regarding: Hip  Contact: [phone]  Hi Dr Maria Dolores Willis       when I go to the bathroom and pee  it's burns  can you send a script for antibiotics  at the [address] [place] [address] or do I have to come in and get tested to pee in a cup?

## 2023-01-17 NOTE — TELEPHONE ENCOUNTER
Clinical  Please call her and see if she is okay with waiting until Friday  I am worried about her waiting 3 days if she has a UTI  Can she see someone else tomorrow?   Danay Mason, DO

## 2023-01-17 NOTE — TELEPHONE ENCOUNTER
Patient says cannot come in tomorrow as she works  She says her symptoms aren't "bad"  She will wait to be seen Friday       Indira Dash MA

## 2023-01-17 NOTE — TELEPHONE ENCOUNTER
Please call patient and have her set up an appointment so we can check her for UTI  Thank you,  Bryant Stephenson, DO

## 2023-01-20 ENCOUNTER — OFFICE VISIT (OUTPATIENT)
Dept: FAMILY MEDICINE CLINIC | Facility: CLINIC | Age: 68
End: 2023-01-20

## 2023-01-20 VITALS
RESPIRATION RATE: 16 BRPM | DIASTOLIC BLOOD PRESSURE: 90 MMHG | WEIGHT: 181 LBS | TEMPERATURE: 97.5 F | SYSTOLIC BLOOD PRESSURE: 140 MMHG | HEART RATE: 93 BPM | OXYGEN SATURATION: 98 % | HEIGHT: 68 IN | BODY MASS INDEX: 27.43 KG/M2

## 2023-01-20 DIAGNOSIS — N39.0 ACUTE UTI: Primary | ICD-10-CM

## 2023-01-20 LAB
SL AMB  POCT GLUCOSE, UA: ABNORMAL
SL AMB LEUKOCYTE ESTERASE,UA: ABNORMAL
SL AMB POCT BILIRUBIN,UA: ABNORMAL
SL AMB POCT BLOOD,UA: ABNORMAL
SL AMB POCT CLARITY,UA: CLEAR
SL AMB POCT COLOR,UA: YELLOW
SL AMB POCT KETONES,UA: ABNORMAL
SL AMB POCT NITRITE,UA: ABNORMAL
SL AMB POCT PH,UA: 6.5
SL AMB POCT SPECIFIC GRAVITY,UA: 1.02
SL AMB POCT URINE PROTEIN: ABNORMAL
SL AMB POCT UROBILINOGEN: 0.2

## 2023-01-20 RX ORDER — CEPHALEXIN 500 MG/1
500 CAPSULE ORAL EVERY 12 HOURS SCHEDULED
Qty: 14 CAPSULE | Refills: 0 | Status: SHIPPED | OUTPATIENT
Start: 2023-01-20 | End: 2023-01-27

## 2023-01-20 NOTE — PROGRESS NOTES
Name: Joycelyn Lewis      : 1955      MRN: [id number]  Encounter Provider: Apollo Hollingsworth DO  Encounter Date: 2023   Encounter department: [address] [place] [address]  Acute UTI  Comments:  New, we discussed the option of sending her urine for culture  Patient currently does not have an insurance would like to avoid a charge for the culture  Since she is otherwise not high risk we will treat empirically  Orders:  -     POCT urine dip auto non-scope  -     cephalexin (KEFLEX) 500 mg capsule; Take 1 capsule (500 mg total) by mouth every 12 (twelve) hours for 7 days        Return if symptoms worsen or fail to improve  Subjective      She has been having cloudy urine and it is still burning, but it is getting better  Review of Systems    Current Outpatient Medications on File Prior to Visit   Medication Sig   • celecoxib (CeleBREX) 200 mg capsule Take 1 capsule (200 mg total) by mouth daily (do not take Advil while on this medication, may take tylenol)       Objective     /90 (BP Location: Right arm, Patient Position: Sitting, Cuff Size: Standard)   Pulse 93   Temp 97 5 °F (36 4 °C) (Temporal)   Resp 16   Ht 5' 8" (1 727 m)   Wt 82 1 kg (181 lb)   SpO2 98%   BMI 27 52 kg/m²     Physical Exam  Vitals and nursing note reviewed  Constitutional:       Appearance: She is well-developed  HENT:      Head: Normocephalic and atraumatic  Right Ear: Tympanic membrane and external ear normal       Left Ear: Tympanic membrane and external ear normal    Cardiovascular:      Rate and Rhythm: Normal rate and regular rhythm  Heart sounds: Normal heart sounds  No murmur heard  No friction rub  Pulmonary:      Effort: Pulmonary effort is normal  No respiratory distress  Breath sounds: Normal breath sounds  No wheezing or rales  Abdominal:      Tenderness: There is no abdominal tenderness  Musculoskeletal:      Right lower leg: No edema        Left lower leg: No edema         Columbia Jarad, DO

## 2023-03-20 ENCOUNTER — TELEPHONE (OUTPATIENT)
Dept: FAMILY MEDICINE CLINIC | Facility: CLINIC | Age: 68
End: 2023-03-20

## 2023-03-20 ENCOUNTER — APPOINTMENT (EMERGENCY)
Dept: RADIOLOGY | Facility: HOSPITAL | Age: 68
End: 2023-03-20

## 2023-03-20 ENCOUNTER — HOSPITAL ENCOUNTER (EMERGENCY)
Facility: HOSPITAL | Age: 68
Discharge: HOME | End: 2023-03-21
Attending: EMERGENCY MEDICINE

## 2023-03-20 VITALS
DIASTOLIC BLOOD PRESSURE: 63 MMHG | TEMPERATURE: 98 F | RESPIRATION RATE: 20 BRPM | BODY MASS INDEX: 27.37 KG/M2 | HEART RATE: 61 BPM | SYSTOLIC BLOOD PRESSURE: 142 MMHG | OXYGEN SATURATION: 97 % | WEIGHT: 180 LBS

## 2023-03-20 DIAGNOSIS — M25.551 BILATERAL HIP PAIN: ICD-10-CM

## 2023-03-20 DIAGNOSIS — M25.552 BILATERAL HIP PAIN: ICD-10-CM

## 2023-03-20 DIAGNOSIS — R51.9 HEADACHE: Primary | ICD-10-CM

## 2023-03-20 LAB
ANION GAP SERPL CALCULATED.3IONS-SCNC: 8 MMOL/L (ref 4–13)
ANISOCYTOSIS BLD QL SMEAR: PRESENT
BASOPHILS # BLD MANUAL: 0 THOUSAND/UL (ref 0–0.1)
BASOPHILS NFR MAR MANUAL: 0 % (ref 0–1)
BUN SERPL-MCNC: 18 MG/DL (ref 5–25)
CALCIUM SERPL-MCNC: 8.5 MG/DL (ref 8.4–10.2)
CHLORIDE SERPL-SCNC: 111 MMOL/L (ref 96–108)
CO2 SERPL-SCNC: 23 MMOL/L (ref 21–32)
CREAT SERPL-MCNC: 0.82 MG/DL (ref 0.6–1.3)
CRP SERPL QL: 6.9 MG/L
EOSINOPHIL # BLD MANUAL: 0 THOUSAND/UL (ref 0–0.4)
EOSINOPHIL NFR BLD MANUAL: 0 % (ref 0–6)
ERYTHROCYTE [DISTWIDTH] IN BLOOD BY AUTOMATED COUNT: 13.2 % (ref 11.6–15.1)
ERYTHROCYTE [SEDIMENTATION RATE] IN BLOOD: 26 MM/HOUR (ref 0–29)
GFR SERPL CREATININE-BSD FRML MDRD: 75 ML/MIN/1.73SQ M
GLUCOSE SERPL-MCNC: 126 MG/DL (ref 65–140)
HCT VFR BLD AUTO: 32.1 % (ref 34.8–46.1)
HGB BLD-MCNC: 10.8 G/DL (ref 11.5–15.4)
LYMPHOCYTES # BLD AUTO: 2.84 THOUSAND/UL (ref 0.6–4.47)
LYMPHOCYTES # BLD AUTO: 32 % (ref 14–44)
MACROCYTES BLD QL AUTO: PRESENT
MCH RBC QN AUTO: 27.1 PG (ref 26.8–34.3)
MCHC RBC AUTO-ENTMCNC: 33.6 G/DL (ref 31.4–37.4)
MCV RBC AUTO: 81 FL (ref 82–98)
MICROCYTES BLD QL AUTO: PRESENT
MONOCYTES # BLD AUTO: 0.44 THOUSAND/UL (ref 0–1.22)
MONOCYTES NFR BLD: 5 % (ref 4–12)
NEUTROPHILS # BLD MANUAL: 5.51 THOUSAND/UL (ref 1.85–7.62)
NEUTS SEG NFR BLD AUTO: 62 % (ref 43–75)
PLATELET # BLD AUTO: 198 THOUSANDS/UL (ref 149–390)
PLATELET BLD QL SMEAR: ADEQUATE
PMV BLD AUTO: 11.2 FL (ref 8.9–12.7)
POTASSIUM SERPL-SCNC: 3.4 MMOL/L (ref 3.5–5.3)
RBC # BLD AUTO: 3.99 MILLION/UL (ref 3.81–5.12)
RBC MORPH BLD: PRESENT
SODIUM SERPL-SCNC: 142 MMOL/L (ref 135–147)
VARIANT LYMPHS # BLD AUTO: 1 %
WBC # BLD AUTO: 8.89 THOUSAND/UL (ref 4.31–10.16)

## 2023-03-20 RX ORDER — CELECOXIB 200 MG/1
200 CAPSULE ORAL DAILY
Qty: 30 CAPSULE | Refills: 3 | Status: SHIPPED | OUTPATIENT
Start: 2023-03-20

## 2023-03-20 RX ORDER — KETOROLAC TROMETHAMINE 30 MG/ML
15 INJECTION, SOLUTION INTRAMUSCULAR; INTRAVENOUS ONCE
Status: COMPLETED | OUTPATIENT
Start: 2023-03-20 | End: 2023-03-20

## 2023-03-20 RX ORDER — METOCLOPRAMIDE HYDROCHLORIDE 5 MG/ML
10 INJECTION INTRAMUSCULAR; INTRAVENOUS ONCE
Status: COMPLETED | OUTPATIENT
Start: 2023-03-20 | End: 2023-03-20

## 2023-03-20 RX ADMIN — METOCLOPRAMIDE 10 MG: 5 INJECTION, SOLUTION INTRAMUSCULAR; INTRAVENOUS at 22:47

## 2023-03-20 RX ADMIN — KETOROLAC TROMETHAMINE 15 MG: 30 INJECTION, SOLUTION INTRAMUSCULAR at 23:23

## 2023-03-20 RX ADMIN — SODIUM CHLORIDE 1000 ML: 0.9 INJECTION, SOLUTION INTRAVENOUS at 21:22

## 2023-03-20 NOTE — TELEPHONE ENCOUNTER
----- Message from Simran Watkins sent at 3/20/2023  6:52 PM EDT -----  Regarding: FW: Headache   Contact: [phone]    ----- Message -----  From: Jewell Verma  Sent: 3/20/2023   6:46 PM EDT  To: [facility] Clinical  Subject: Headache                                         Hi Dr ,Merlin Ricker  I have never had a headache in my 68yrs  I have had a headache for 5 days now   No pain medication  works and it's not even bad I can tolerate it but it's been 5 days

## 2023-03-20 NOTE — TELEPHONE ENCOUNTER
Spoke with patient  She said there are no other symptoms besides headaches and I advised she needs to be seen to help diagnose what is going on    She was given options, but felt most comfortable with going to [address] [place] [address], [place]

## 2023-03-21 NOTE — ED NOTES
Patient returned from [facility], [address] [place] [address]  03/20/23 0085
Patient transported to [facility] Ritchie RN  03/20/23 1112
yes

## 2023-03-21 NOTE — ED PROVIDER NOTES
History  Chief Complaint   Patient presents with   • Headache     Pt reported that she has been having headache for the past 5 days  OTC doesn't help     Patient presents for evaluation of a headache for the last 5 days  Headache gradual onset is not associated with any nausea or vomiting  Headache is frontal rated 5 out of 10  Is not debilitating more of a nagging pain  Patient states she has not had a headache like this before and that is why she was concerned  She is taking over-the-counter Tylenol with little effect  History provided by:  Patient   used: No        Prior to Admission Medications   Prescriptions Last Dose Informant Patient Reported? Taking?   celecoxib (CeleBREX) 200 mg capsule 3/20/2023 at 0800  No Yes   Sig: Take 1 capsule (200 mg total) by mouth daily (do not take Advil while on this medication, may take tylenol)      Facility-Administered Medications: None       Past Medical History:   Diagnosis Date   • Asymptomatic bacteriuria 4/9/2020   • Chest tightness 3/31/2020   • COVID-19 virus infection 4/7/2020   • H/O antinuclear antibodies    • Hallucinations 4/8/2020   • Pneumonia due to COVID-19 virus 4/9/2020   • [facility] 3/31/2020   • Urinary tract infection     last assessed: 10/24/2013       Past Surgical History:   Procedure Laterality Date   • APPENDECTOMY         Family History   Problem Relation Age of Onset   • Diabetes Mother    • No Known Problems Father    • No Known Problems Sister    • No Known Problems Sister    • No Known Problems Maternal Grandmother    • No Known Problems Maternal Grandfather    • No Known Problems Paternal Grandmother    • No Known Problems Paternal Grandfather      I have reviewed and agree with the history as documented      E-Cigarette/Vaping   • E-Cigarette Use Never User      E-Cigarette/Vaping Substances     Social History     Tobacco Use   • Smoking status: Never   • Smokeless tobacco: Never   Vaping Use   • Vaping Use: Never used   Substance Use Topics   • Alcohol use: Not Currently   • Drug use: Not Currently       Review of Systems   Constitutional: Negative for chills and fever  HENT: Negative for congestion, ear pain and sore throat  Eyes: Negative for pain and visual disturbance  Respiratory: Negative for cough and shortness of breath  Cardiovascular: Negative for chest pain and palpitations  Gastrointestinal: Negative for abdominal pain and vomiting  Genitourinary: Negative for dysuria and hematuria  Musculoskeletal: Negative for arthralgias and back pain  Skin: Negative for color change and rash  Neurological: Positive for headaches  Negative for seizures, syncope, weakness and numbness  All other systems reviewed and are negative  Physical Exam  Physical Exam  Vitals and nursing note reviewed  Constitutional:       General: She is not in acute distress  HENT:      Head: Atraumatic  Right Ear: External ear normal       Left Ear: External ear normal       Nose: Nose normal       Mouth/Throat:      Mouth: Mucous membranes are moist       Pharynx: Oropharynx is clear  Eyes:      General: No scleral icterus  Extraocular Movements: Extraocular movements intact  Conjunctiva/sclera: Conjunctivae normal       Pupils: Pupils are equal, round, and reactive to light  Cardiovascular:      Rate and Rhythm: Normal rate and regular rhythm  Pulses: Normal pulses  Pulmonary:      Effort: Pulmonary effort is normal  No respiratory distress  Breath sounds: Normal breath sounds  Musculoskeletal:         General: No deformity  Normal range of motion  Skin:     Capillary Refill: Capillary refill takes less than 2 seconds  Findings: No rash  Neurological:      General: No focal deficit present  Mental Status: She is alert and oriented to person, place, and time  Cranial Nerves: No cranial nerve deficit        Sensory: No sensory deficit  Motor: No weakness        Coordination: Coordination normal       Gait: Gait normal          Vital Signs  ED Triage Vitals   Temperature Pulse Respirations Blood Pressure SpO2   03/20/23 2021 03/20/23 2021 03/20/23 2021 03/20/23 2021 03/20/23 2021   98 °F (36 7 °C) 79 18 133/84 97 %      Temp Source Heart Rate Source Patient Position - Orthostatic VS BP Location FiO2 (%)   03/20/23 2021 03/20/23 2021 03/20/23 2252 03/20/23 2021 --   Tympanic Monitor Sitting Right arm       Pain Score       03/20/23 2021       3           Vitals:    03/20/23 2021 03/20/23 2252 03/20/23 2300 03/20/23 2328   BP: 133/84 159/74 159/74 142/63   Pulse: 79 64 64 61   Patient Position - Orthostatic VS:  Sitting Sitting Sitting         Visual Acuity  Visual Acuity    Flowsheet Row Most Recent Value   L Pupil Size (mm) 3   R Pupil Size (mm) 3          ED Medications  Medications   sodium chloride 0 9 % bolus 1,000 mL (0 mL Intravenous Stopped 3/21/23 0015)   metoclopramide (REGLAN) injection 10 mg (10 mg Intravenous Given 3/20/23 2247)   ketorolac (TORADOL) injection 15 mg (15 mg Intravenous Given 3/20/23 2323)       Diagnostic Studies  Results Reviewed     Procedure Component Value Units Date/Time    Basic metabolic panel [[id number]]  (Abnormal) Collected: 03/20/23 2122    Lab Status: Final result Specimen: Blood from Arm, Right Updated: 03/20/23 2217     Sodium 142 mmol/L      Potassium 3 4 mmol/L      Chloride 111 mmol/L      CO2 23 mmol/L      ANION GAP 8 mmol/L      BUN 18 mg/dL      Creatinine 0 82 mg/dL      Glucose 126 mg/dL      Calcium 8 5 mg/dL      eGFR 75 ml/min/1 73sq m     Narrative:      [place] guidelines for Chronic Kidney Disease (CKD):   •  Stage 1 with normal or high GFR (GFR > 90 mL/min/1 73 square meters)  •  Stage 2 Mild CKD (GFR = 60-89 mL/min/1 73 square meters)  •  Stage 3A Moderate CKD (GFR = 45-59 mL/min/1 73 square meters)  •  Stage 3B Moderate CKD (GFR = 30-44 mL/min/1 73 square meters)  •  Stage 4 Severe CKD (GFR = 15-29 mL/min/1 73 square meters)  •  Stage 5 End Stage CKD (GFR <15 mL/min/1 73 square meters)  Note: GFR calculation is accurate only with a steady state creatinine    C-reactive protein [[id number]]  (Abnormal) Collected: 03/20/23 2122    Lab Status: Final result Specimen: Blood from Arm, Right Updated: 03/20/23 2217     CRP 6 9 mg/L     CBC and differential [[id number]]  (Abnormal) Collected: 03/20/23 2122    Lab Status: Final result Specimen: Blood from Arm, Right Updated: 03/20/23 2203     WBC 8 89 Thousand/uL      RBC 3 99 Million/uL      Hemoglobin 10 8 g/dL      Hematocrit 32 1 %      MCV 81 fL      MCH 27 1 pg      MCHC 33 6 g/dL      RDW 13 2 %      MPV 11 2 fL      Platelets 260 Thousands/uL     Narrative: This is an appended report  These results have been appended to a previously verified report  Manual Differential(PHLEBS Do Not Order) [[id number]]  (Abnormal) Collected: 03/20/23 2122    Lab Status: Final result Specimen: Blood from Arm, Right Updated: 03/20/23 2203     Segmented % 62 %      Lymphocytes % 32 %      Monocytes % 5 %      Eosinophils, % 0 %      Basophils % 0 %      Atypical Lymphocytes % 1 %      Absolute Neutrophils 5 51 Thousand/uL      Lymphocytes Absolute 2 84 Thousand/uL      Monocytes Absolute 0 44 Thousand/uL      Eosinophils Absolute 0 00 Thousand/uL      Basophils Absolute 0 00 Thousand/uL      Total Counted --     RBC Morphology Present     Anisocytosis Present     Macrocytes Present     Microcytes Present     Platelet Estimate Adequate    Sedimentation rate, automated [[id number]]  (Normal) Collected: 03/20/23 2122    Lab Status: Final result Specimen: Blood from Arm, Right Updated: 03/20/23 2132     Sed Rate 26 mm/hour                  CT head without contrast   Final Result by Yaquelin Paredes MD (03/20 2251)      No acute intracranial abnormality                    Workstation performed: [id number] Procedures  Procedures         ED Course                                             Medical Decision Making  Pulse ox 97% on room air indicating adequate oxygenation  CT scan results discussed with patient at bedside no acute abnormality  Patient's pain improved with medications in the ER recommend follow-up with primary care physician  Amount and/or Complexity of Data Reviewed  Labs: ordered  Radiology: ordered  Risk  Prescription drug management  Disposition  Final diagnoses:   Headache     Time reflects when diagnosis was documented in both MDM as applicable and the Disposition within this note     Time User Action Codes Description Comment    3/20/2023 10:57 PM Elena Noble Add [R51 9] Headache       ED Disposition     ED Disposition   Discharge    Condition   Stable    Date/Time   Mon Mar 20, 2023 10:57 PM    [facility] discharge to home/self care  Follow-up Information     Follow up With Specialties Details Why Contact Info Additional Information    Chevy Beltran MD Neurology In 1 week  [address] [address]  [facility] Emergency Department Emergency Medicine  If symptoms worsen [address] [address] [address] [address] Emergency Department, [place], [place], [address]          Discharge Medication List as of 3/20/2023 10:58 PM      CONTINUE these medications which have NOT CHANGED    Details   celecoxib (CeleBREX) 200 mg capsule Take 1 capsule (200 mg total) by mouth daily (do not take Advil while on this medication, may take tylenol), Starting Mon 3/20/2023, Normal             No discharge procedures on file      PDMP Review     None          ED Provider  Electronically Signed by           Colette Arguelles DO  03/23/23 1524

## 2023-06-16 ENCOUNTER — OFFICE VISIT (OUTPATIENT)
Dept: FAMILY MEDICINE CLINIC | Facility: CLINIC | Age: 68
End: 2023-06-16

## 2023-06-16 VITALS
DIASTOLIC BLOOD PRESSURE: 86 MMHG | TEMPERATURE: 98.2 F | BODY MASS INDEX: 28.7 KG/M2 | RESPIRATION RATE: 18 BRPM | WEIGHT: 189.4 LBS | HEIGHT: 68 IN | SYSTOLIC BLOOD PRESSURE: 142 MMHG | HEART RATE: 51 BPM

## 2023-06-16 DIAGNOSIS — L03.032 PARONYCHIA OF GREAT TOE, LEFT: Primary | ICD-10-CM

## 2023-06-16 DIAGNOSIS — R73.01 IMPAIRED FASTING GLUCOSE: ICD-10-CM

## 2023-06-16 LAB — SL AMB POCT HEMOGLOBIN AIC: 5.6 (ref ?–6.5)

## 2023-06-16 PROCEDURE — 83036 HEMOGLOBIN GLYCOSYLATED A1C: CPT | Performed by: NURSE PRACTITIONER

## 2023-06-16 PROCEDURE — 99213 OFFICE O/P EST LOW 20 MIN: CPT | Performed by: NURSE PRACTITIONER

## 2023-06-16 RX ORDER — CEPHALEXIN 500 MG/1
500 CAPSULE ORAL EVERY 12 HOURS SCHEDULED
Qty: 20 CAPSULE | Refills: 0 | Status: SHIPPED | OUTPATIENT
Start: 2023-06-16 | End: 2023-06-26

## 2023-06-16 NOTE — PATIENT INSTRUCTIONS
Cephalexin (By mouth)   Cephalexin ([id number])  Treats infections  This medicine is a cephalosporin antibiotic  Brand Name(s): Keflex   There may be other brand names for this medicine  When This Medicine Should Not Be Used: This medicine is not right for everyone  Do not use this medicine if you had an allergic reaction to cephalexin or another cephalosporin medicine  How to Use This Medicine:   Capsule, Tablet, Tablet for Suspension, Liquid  Your doctor will tell you how much medicine to use  Do not use more than directed  Read and follow the patient instructions that come with this medicine  Talk to your doctor or pharmacist if you have any questions  You may take your medicine with food or milk to avoid stomach upset  Oral liquid: Shake well just before use  Measure the oral liquid medicine with a marked measuring spoon, oral syringe, or medicine cup  Take all of the medicine in your prescription to clear up your infection, even if you feel better after the first few doses  Missed dose: Take a dose as soon as you remember  If it is almost time for your next dose, wait until then and take a regular dose  Do not take extra medicine to make up for a missed dose  Capsule or tablet: Store at room temperature away from heat, moisture, and direct light  Oral liquid: Store in the refrigerator for 14 days  After 14 days, throw away any unused medicine  Do not freeze  Drugs and Foods to Avoid:   Ask your doctor or pharmacist before using any other medicine, including over-the-counter medicines, vitamins, and herbal products  Some medicines and foods can affect how cephalexin works  Tell your doctor if you are also using  Metformin  Probenecid  Warnings While Using This Medicine:   Tell your doctor if you are pregnant or breastfeeding, or if you have kidney disease, liver disease, or a history of digestive problems, such as colitis  Tell your doctor if you are allergic to penicillin    This medicine can cause diarrhea  Call your doctor if the diarrhea becomes severe, does not stop, or is bloody  Do not take any medicine to stop diarrhea until you have talked to your doctor  Diarrhea can occur 2 months or more after you stop taking this medicine  Tell any doctor or dentist who treats you that you are using this medicine  This medicine may affect certain medical test results  Call your doctor if your symptoms do not improve or if they get worse  Keep all medicine out of the reach of children  Never share your medicine with anyone  Possible Side Effects While Using This Medicine:   Call your doctor right away if you notice any of these side effects: Allergic reaction: Itching or hives, swelling in your face or hands, swelling or tingling in your mouth or throat, chest tightness, trouble breathing  Blistering, peeling, red skin rash  Severe diarrhea, especially if bloody or ongoing  Severe stomach pain, vomiting  If you notice these less serious side effects, talk with your doctor:   Mild diarrhea or nausea  If you notice other side effects that you think are caused by this medicine, tell your doctor  Call your doctor for medical advice about side effects  You may report side effects to FDA at 1-571-FDA-4274  © Copyright Reese Castillo 2022 Information is for End User's use only and may not be sold, redistributed or otherwise used for commercial purposes  The above information is an  only  It is not intended as medical advice for individual conditions or treatments  Talk to your doctor, nurse or pharmacist before following any medical regimen to see if it is safe and effective for you  Paronychia   WHAT YOU NEED TO KNOW:   What is paronychia? Paronychia is an infection of your nail fold caused by bacteria or a fungus  The nail fold is the skin around your nail  Paronychia may happen suddenly and last for 6 weeks or longer  You may have paronychia on more than 1 finger or toe    What increases my risk for paronychia? Trauma:  Any injury that causes your skin to tear can lead to infection  Your risk is increased if you have ingrown nails, bite your nails, or wear acrylic nails  Frequent contact with water:  Jobs that require you to soak your hands in water often may increase your risk for paronychia  Common examples are nurses, cooks, and   Swimmers also have increased risk  Medical conditions:  Diabetes and other conditions that cause a weak immune system can increase your risk  Some examples are skin cancer, psoriasis, HIV, and lupus  Chemicals:  Contact with soaps, detergents, and other chemicals can cause inflammation and lead to paronychia  Allergies: Allergies to certain foods, nail polish, or latex can cause inflammation and increase your risk  What are the signs and symptoms of paronychia? Red, hot, swollen, painful nail fold    Pus coming out of your nail fold when you press on it    Nail that pulls away from your nail fold and may fall off    Changes in nail color, such as green nails    Fever    Thick, rough nail, or ridges in the nail    How is paronychia diagnosed? Your healthcare provider will examine your nails and ask about your symptoms  Your provider may press on your infected nail to see if pus drains from it  Your provider will send any pus to a lab for tests to learn what germ is causing your infection  This is called a fluid culture  How is paronychia treated? Medicine:     Td vaccine  is a booster shot used to help prevent tetanus and diphtheria  The Td booster may be given to adolescents and adults every 10 years or for certain wounds and injuries  Antibiotics: This medicine will help fight or prevent an infection caused by bacteria  It may be given as a pill, cream, or ointment  Steroids: This medicine will help decrease inflammation  It may be given as a pill, cream, or ointment  Antifungal medicine:   This medicine helps kill fungus that may be causing your infection  It may be given as a cream or ointment  NSAIDs:  These medicines decrease pain and swelling  NSAIDs are available without a doctor's order  Ask your healthcare provider which medicine is right for you  Ask how much to take and when to take it  Take as directed  NSAIDs can cause stomach bleeding and kidney problems if not taken correctly  Procedures: You may need surgery to drain an abscess (pus pocket) in your finger or toe  Your nail may need to be removed  Infected tissue around your nail may also need to be removed  What are the risks of paronychia? Your nail may become loose, deformed, or fall off  The infection may form a large abscess on your nail  The infection may spread to nearby tissue and bone  How can paronychia be prevented? Avoid chemicals and allergens that may harm your skin and nails  This includes soaps, laundry detergents, and nail products  Keep your nails clean and dry  Do not soak your nails in water  Use cotton-lined rubber gloves or wear 2 rubber gloves if you work with food or water  The gloves will help protect your nail folds  Keep your nails short  Do not bite your nails, pick at your hangnails, suck your fingers, or wear fake nails  Bring your own nail tools when you go to the nail salon  How can I manage my symptoms? Soak your nail:  Soak your nail in a mixture of equal parts vinegar and water 3 or 4 times each day  This will help decrease inflammation  Apply a warm compress:  Soak a washcloth in warm water and place it on your nail  This will help decrease inflammation  Elevate:  Raise your nail above the level of your heart as often as you can  This will help decrease swelling and pain  Prop your nail on pillows or blankets to keep it elevated comfortably  Use lotion:  Apply lotion after you wash your hands  This will prevent the skin from becoming too dry  When should I seek immediate care?    You have severe nail pain     The inflammation spreads to your hand or arm  When should I call my doctor? Your nail becomes loose, deformed, or falls off  You have a large abscess on your nail  You have questions or concerns about your condition or care  CARE AGREEMENT:   You have the right to help plan your care  Learn about your health condition and how it may be treated  Discuss treatment options with your healthcare providers to decide what care you want to receive  You always have the right to refuse treatment  The above information is an  only  It is not intended as medical advice for individual conditions or treatments  Talk to your doctor, nurse or pharmacist before following any medical regimen to see if it is safe and effective for you  © Copyright Sergio Tobi 2022 Information is for End User's use only and may not be sold, redistributed or otherwise used for commercial purposes

## 2023-06-16 NOTE — PROGRESS NOTES
"Assessment/Plan:    1  Paronychia of great toe, left  Comments:  will do epson salt baths and if no improvement then will follow with podiatrist  Orders:  -     cephalexin (KEFLEX) 500 mg capsule; Take 1 capsule (500 mg total) by mouth every 12 (twelve) hours for 10 days  -     mupirocin (BACTROBAN) 2 % ointment; Apply topically 3 (three) times a day  -     Ambulatory Referral to Podiatry; Future    2  Impaired fasting glucose  -     POCT hemoglobin A1c      Recent Results (from the past 24 hour(s))   POCT hemoglobin A1c    Collection Time: 06/16/23  3:27 PM   Result Value Ref Range    Hemoglobin A1C 5 6 6 5           Patient Instructions: Take medication with food  It is important that you take the entire course of antibiotics prescribed  May also take a probiotic of your choice to maintain healthy GI mario  Can take some probiotic and yogurt with the medication  Supportive care discussed and advised  Advised to RTO for any worsening and no improvement  Follow up for no improvement and worsening of conditions  Patient advised and educated when to see immediate medical care  Return if symptoms worsen or fail to improve  Future Appointments   Date Time Provider Roxanne Lenz   7/3/2023  2:00 PM DO PA [facility]           Subjective:      Patient ID: Linda Harmon is a 79 y o  female  Chief Complaint   Patient presents with   • Toe Injury     Sore and cut on toe that will not heal  Left foot nm lpn   • blood sugar     Would like blood sugar checked to see if that is why the sore will not heal         Vitals:  /86   Pulse (!) 51   Temp 98 2 °F (36 8 °C)   Resp 18   Ht 5' 8\" (1 727 m)   Wt 85 9 kg (189 lb 6 4 oz)   BMI 28 80 kg/m²     HPI  Patient stated that has sore on under right toe nail from more than a month and drains purulent discharge at times and not healing and got worried   Also wants her HbA1c checked and does not have insurance and stated that ok " to pay money  Denies fever and chills                The following portions of the patient's history were reviewed and updated as appropriate: allergies, current medications, past family history, past medical history, past social history, past surgical history and problem list       Review of Systems   HENT: Negative  Respiratory: Negative  Cardiovascular: Negative  Gastrointestinal: Negative  Musculoskeletal:        As noted in HPI           Objective:    Social History     Tobacco Use   Smoking Status Never   Smokeless Tobacco Never       Allergies: No Known Allergies      Current Outpatient Medications   Medication Sig Dispense Refill   • celecoxib (CeleBREX) 200 mg capsule Take 1 capsule (200 mg total) by mouth daily (do not take Advil while on this medication, may take tylenol) 30 capsule 3   • cephalexin (KEFLEX) 500 mg capsule Take 1 capsule (500 mg total) by mouth every 12 (twelve) hours for 10 days 20 capsule 0   • mupirocin (BACTROBAN) 2 % ointment Apply topically 3 (three) times a day 22 g 0     No current facility-administered medications for this visit  Physical Exam  Constitutional:       Appearance: Normal appearance  HENT:      Head: Normocephalic and atraumatic  Nose: Nose normal    Eyes:      Conjunctiva/sclera: Conjunctivae normal    Cardiovascular:      Rate and Rhythm: Normal rate and regular rhythm  Pulses: Normal pulses  Heart sounds: Normal heart sounds  Pulmonary:      Effort: Pulmonary effort is normal       Breath sounds: Normal breath sounds  Skin:     General: Skin is warm and dry  Comments: Under right great toe nail area is macerated and scan discharge is noted and erythema noted on proximal nail fold area without any tenderness   Neurological:      Mental Status: She is alert and oriented to person, place, and time  Psychiatric:         Mood and Affect: Mood normal          Behavior: Behavior normal          Thought Content:  Thought content normal          Judgment: Judgment normal                      TEDDY Ruiz

## 2023-07-12 NOTE — PROGRESS NOTES
Assessment/Plan:    1  Itchy scalp  -     hydrOXYzine HCL (ATARAX) 25 mg tablet; Take 1 tablet (25 mg total) by mouth 2 (two) times a day  -     Ambulatory referral to Dermatology; Future          BMI Counseling: Body mass index is 28 04 kg/m²  Discussed the patient's BMI with her  The BMI is above normal  Nutrition recommendations include reducing portion sizes, decreasing overall calorie intake, 3-5 servings of fruits/vegetables daily, reducing fast food intake, consuming healthier snacks, decreasing soda and/or juice intake and moderation in carbohydrate intake  Patient Instructions:  Supportive care discussed and advised  Advised to RTO for any worsening and no improvement  Follow up for no improvement and worsening of conditions  Patient advised and educated when to see immediate medical care  Return for Annual physical       Future Appointments   Date Time Provider Roxanne Lenz   2/3/2020  3:00 PM DO [facility]           Subjective:      Patient ID: Amirah Velazquez is a 60 y o  female  Chief Complaint   Patient presents with    Follow-up     med use-needs refill-lj    head not scalp itches         Vitals:  /80   Pulse 72   Temp 98 4 °F (36 9 °C)   Resp 16   Ht 5' 7" (1 702 m)   Wt 81 2 kg (179 lb)   BMI 28 04 kg/m²     HPI  Patient was seen in office before for itchy scalp  Patient was started on atarax and stated that started with one pill daily as was making her drowsy but when drowsiness got better, increased to 2 pills a day  Stated that her itching is much controlled but still have occasional periods and wants to see derm  Denies any fever, chills            PHQ-9 Depression Screening    PHQ-9:    Frequency of the following problems over the past two weeks:       Little interest or pleasure in doing things:  0 - not at all  Feeling down, depressed, or hopeless:  0 - not at all  PHQ-2 Score:  0             The following portions of the patient's history were reviewed and updated as appropriate: allergies, current medications, past family history, past medical history, past social history, past surgical history and problem list       Review of Systems   Constitutional: Negative  Respiratory: Negative  Cardiovascular: Negative  Gastrointestinal: Negative  Skin:        Itchy scalp but much improved   Psychiatric/Behavioral: Negative  Objective:    Social History     Tobacco Use   Smoking Status Never Smoker   Smokeless Tobacco Never Used       Allergies: No Known Allergies      Current Outpatient Medications   Medication Sig Dispense Refill    hydrOXYzine HCL (ATARAX) 25 mg tablet Take 1 tablet (25 mg total) by mouth 2 (two) times a day 60 tablet 1    ketoconazole (NIZORAL) 2 % shampoo Apply 1 application topically 2 (two) times a week (Patient not taking: Reported on 1/10/2020) 120 mL 0     No current facility-administered medications for this visit  Physical Exam   Constitutional: She appears well-developed and well-nourished  Cardiovascular: Normal rate, regular rhythm and normal heart sounds  Pulmonary/Chest: Effort normal and breath sounds normal    Skin: Skin is warm and dry  Denies any rash   Psychiatric: She has a normal mood and affect   Her behavior is normal  Judgment and thought content normal                    TEDDY Duenas Bactrim Pregnancy And Lactation Text: This medication is Pregnancy Category D and is known to cause fetal risk.  It is also excreted in breast milk.

## 2023-07-24 ENCOUNTER — OFFICE VISIT (OUTPATIENT)
Dept: OBGYN CLINIC | Facility: CLINIC | Age: 68
End: 2023-07-24

## 2023-07-24 ENCOUNTER — APPOINTMENT (OUTPATIENT)
Dept: RADIOLOGY | Facility: CLINIC | Age: 68
End: 2023-07-24
Payer: COMMERCIAL

## 2023-07-24 VITALS
BODY MASS INDEX: 28.59 KG/M2 | TEMPERATURE: 98.6 F | WEIGHT: 188 LBS | DIASTOLIC BLOOD PRESSURE: 78 MMHG | SYSTOLIC BLOOD PRESSURE: 148 MMHG | HEART RATE: 88 BPM

## 2023-07-24 DIAGNOSIS — M25.561 PAIN IN BOTH KNEES, UNSPECIFIED CHRONICITY: Primary | ICD-10-CM

## 2023-07-24 DIAGNOSIS — M25.562 PAIN IN BOTH KNEES, UNSPECIFIED CHRONICITY: ICD-10-CM

## 2023-07-24 DIAGNOSIS — M25.561 PAIN IN BOTH KNEES, UNSPECIFIED CHRONICITY: ICD-10-CM

## 2023-07-24 DIAGNOSIS — M25.562 PAIN IN BOTH KNEES, UNSPECIFIED CHRONICITY: Primary | ICD-10-CM

## 2023-07-24 DIAGNOSIS — M16.10 HIP ARTHRITIS: ICD-10-CM

## 2023-07-24 PROCEDURE — 99203 OFFICE O/P NEW LOW 30 MIN: CPT | Performed by: PHYSICIAN ASSISTANT

## 2023-07-24 PROCEDURE — 73562 X-RAY EXAM OF KNEE 3: CPT

## 2023-07-24 NOTE — PROGRESS NOTES
Assessment/Plan:  1. Pain in both knees, unspecified chronicity  XR knee 3 vw left non injury    XR knee 3 vw right non injury      2. Hip arthritis          Although the patient did come in for evaluation of the right knee, I have high suspicion her knee pain is coming from her hip. She has a very benign examination of her knee today but has significant discomfort with internal and external rotation of the right hip as well as a positive Stinchfield test.  This is likely the source of her thigh pain radiating down to the right knee. I did have a long discussion with the patient regarding this today. I would like to refer the patient to Dr. Glenna Claire to try an intra-articular steroid injection for the hip. If this improves her thigh and knee pain, this will confirm my suspicion that this is radiating from her hip. As for the small calcification about the right knee, this is quite rounded and does not appear to be an acute finding. If her hip injection does not help her thigh and knee pain, we can certainly evaluate this further with an MRI. I also prescribed her physical therapy for the hip and knee. She will follow-up after her steroid injection if she fails to improve. Subjective:   Candy Haas is a 79 y.o. female who presents today for evaluation of her right knee. She notes this started about 3 months ago, with no inciting event prior to the onset of her symptoms. She notes the pain starts about the thigh and radiates down toward the knee. This seems to be worse with activity and better with rest.  She notes full range of motion of the knee and denies any swelling. She notes good sensation of the right lower extremity. She notes a similar, much more mild, pain about the left leg at times. Review of Systems   Constitutional: Negative. Negative for chills and fever. HENT: Negative. Negative for ear pain and sore throat. Eyes: Negative. Negative for pain and redness.    Respiratory: Negative. Negative for shortness of breath and wheezing. Cardiovascular: Negative for chest pain and palpitations. Gastrointestinal: Negative. Negative for abdominal pain and blood in stool. Endocrine: Negative. Negative for polydipsia and polyuria. Genitourinary: Negative. Negative for difficulty urinating and dysuria. Musculoskeletal:        As noted in HPI   Skin: Negative. Negative for pallor and rash. Neurological: Negative. Negative for dizziness and numbness. Hematological: Negative. Negative for adenopathy. Does not bruise/bleed easily. Psychiatric/Behavioral: Negative. Negative for confusion and suicidal ideas.          Past Medical History:   Diagnosis Date   • Asymptomatic bacteriuria 4/9/2020   • Chest tightness 3/31/2020   • COVID-19 virus infection 4/7/2020   • H/O antinuclear antibodies    • Hallucinations 4/8/2020   • Pneumonia due to COVID-19 virus 4/9/2020   • SIRS (systemic inflammatory response syndrome) ([address]) 3/31/2020   • Urinary tract infection     last assessed: 10/24/2013       Past Surgical History:   Procedure Laterality Date   • APPENDECTOMY         Family History   Problem Relation Age of Onset   • Diabetes Mother    • No Known Problems Father    • No Known Problems Sister    • No Known Problems Sister    • No Known Problems Maternal Grandmother    • No Known Problems Maternal Grandfather    • No Known Problems Paternal Grandmother    • No Known Problems Paternal Grandfather        Social History     Occupational History   • Not on file   Tobacco Use   • Smoking status: Never   • Smokeless tobacco: Never   Vaping Use   • Vaping Use: Never used   Substance and Sexual Activity   • Alcohol use: Not Currently   • Drug use: Not Currently   • Sexual activity: Not Currently     Partners: Male         Current Outpatient Medications:   •  celecoxib (CeleBREX) 200 mg capsule, Take 1 capsule (200 mg total) by mouth daily (do not take Advil while on this medication, may take tylenol), Disp: 30 capsule, Rfl: 3  •  mupirocin (BACTROBAN) 2 % ointment, APPLY TOPICALLY 3 (THREE) TIMES A DAY, Disp: 22 g, Rfl: 0    No Known Allergies    Objective:  Vitals:    07/24/23 1359   BP: 148/78   Pulse: 88   Temp: 98.6 °F (37 °C)     Pain Score:   6      Right Knee Exam     Tenderness   The patient is experiencing no tenderness. Range of Motion   Extension: normal   Flexion: normal     Tests   Varus: negative Valgus: negative  Drawer:  Anterior - negative    Posterior - negative    Other   Erythema: absent  Sensation: normal  Pulse: present  Swelling: none  Effusion: no effusion present      Right Hip Exam     Tenderness   The patient is experiencing no tenderness. Range of Motion   Flexion: 100 (with pain)   External rotation: 10 (With pain)   Internal rotation: 5 (With pain)     Other   Erythema: absent  Sensation: normal  Pulse: present    Comments:  Positive Stinchfield test          Observations     Right Knee   Negative for effusion. Physical Exam  Constitutional:       General: She is not in acute distress. Appearance: She is well-developed. HENT:      Head: Normocephalic and atraumatic. Eyes:      General: No scleral icterus. Conjunctiva/sclera: Conjunctivae normal.   Neck:      Vascular: No JVD. Cardiovascular:      Rate and Rhythm: Normal rate. Pulmonary:      Effort: Pulmonary effort is normal. No respiratory distress. Musculoskeletal:      Right knee: No effusion. Skin:     General: Skin is warm. Neurological:      Mental Status: She is alert and oriented to person, place, and time. Coordination: Coordination normal.         I have personally reviewed pertinent films in PACS and my interpretation is as follows:  X-rays bilateral knees from today: Very mild medial compartment narrowing. Rounded calcific density anterior medial right knee. X-rays pelvis from August 2022: Moderate degenerative changes in both hips.       This document was created using speech voice recognition software. Grammatical errors, random word insertions, pronoun errors, and incomplete sentences are an occasional consequence of this system due to software limitations, ambient noise, and hardware issues. Any formal questions or concerns about content, text, or information contained within the body of this dictation should be directly addressed to the provider for clarification.

## 2023-07-30 ENCOUNTER — HOSPITAL ENCOUNTER (EMERGENCY)
Facility: HOSPITAL | Age: 68
Discharge: HOME | End: 2023-07-30
Attending: EMERGENCY MEDICINE
Payer: COMMERCIAL

## 2023-07-30 ENCOUNTER — APPOINTMENT (EMERGENCY)
Dept: RADIOLOGY | Facility: HOSPITAL | Age: 68
End: 2023-07-30
Payer: COMMERCIAL

## 2023-07-30 VITALS
DIASTOLIC BLOOD PRESSURE: 75 MMHG | TEMPERATURE: 99.8 F | HEART RATE: 99 BPM | SYSTOLIC BLOOD PRESSURE: 148 MMHG | OXYGEN SATURATION: 95 % | RESPIRATION RATE: 20 BRPM

## 2023-07-30 DIAGNOSIS — N39.0 UTI (URINARY TRACT INFECTION): ICD-10-CM

## 2023-07-30 DIAGNOSIS — U07.1 COVID-19 VIRUS INFECTION: Primary | ICD-10-CM

## 2023-07-30 DIAGNOSIS — B34.9 VIRAL SYNDROME: ICD-10-CM

## 2023-07-30 LAB
2HR DELTA HS TROPONIN: -1 NG/L
ALBUMIN SERPL BCP-MCNC: 4.6 G/DL (ref 3.5–5)
ALP SERPL-CCNC: 113 U/L (ref 34–104)
ALT SERPL W P-5'-P-CCNC: 11 U/L (ref 7–52)
ANION GAP SERPL CALCULATED.3IONS-SCNC: 8 MMOL/L
AST SERPL W P-5'-P-CCNC: 14 U/L (ref 13–39)
BACTERIA UR QL AUTO: ABNORMAL /HPF
BASOPHILS # BLD MANUAL: 0 THOUSAND/UL (ref 0–0.1)
BASOPHILS NFR MAR MANUAL: 0 % (ref 0–1)
BILIRUB SERPL-MCNC: 0.72 MG/DL (ref 0.2–1)
BILIRUB UR QL STRIP: NEGATIVE
BUN SERPL-MCNC: 12 MG/DL (ref 5–25)
CALCIUM SERPL-MCNC: 9.6 MG/DL (ref 8.4–10.2)
CARDIAC TROPONIN I PNL SERPL HS: 7 NG/L
CARDIAC TROPONIN I PNL SERPL HS: 8 NG/L
CHLORIDE SERPL-SCNC: 104 MMOL/L (ref 96–108)
CLARITY UR: ABNORMAL
CO2 SERPL-SCNC: 25 MMOL/L (ref 21–32)
COLOR UR: ABNORMAL
CREAT SERPL-MCNC: 0.88 MG/DL (ref 0.6–1.3)
EOSINOPHIL # BLD MANUAL: 0 THOUSAND/UL (ref 0–0.4)
EOSINOPHIL NFR BLD MANUAL: 0 % (ref 0–6)
ERYTHROCYTE [DISTWIDTH] IN BLOOD BY AUTOMATED COUNT: 13.5 % (ref 11.6–15.1)
FLUAV RNA RESP QL NAA+PROBE: NEGATIVE
FLUBV RNA RESP QL NAA+PROBE: NEGATIVE
GFR SERPL CREATININE-BSD FRML MDRD: 68 ML/MIN/1.73SQ M
GLUCOSE SERPL-MCNC: 126 MG/DL (ref 65–140)
GLUCOSE UR STRIP-MCNC: NEGATIVE MG/DL
HCT VFR BLD AUTO: 35.9 % (ref 34.8–46.1)
HGB BLD-MCNC: 12.3 G/DL (ref 11.5–15.4)
HGB UR QL STRIP.AUTO: ABNORMAL
KETONES UR STRIP-MCNC: NEGATIVE MG/DL
LACTATE SERPL-SCNC: 1 MMOL/L (ref 0.5–2)
LEUKOCYTE ESTERASE UR QL STRIP: ABNORMAL
LYMPHOCYTES # BLD AUTO: 0.36 THOUSAND/UL (ref 0.6–4.47)
LYMPHOCYTES # BLD AUTO: 3 % (ref 14–44)
MAGNESIUM SERPL-MCNC: 1.8 MG/DL (ref 1.9–2.7)
MCH RBC QN AUTO: 27.2 PG (ref 26.8–34.3)
MCHC RBC AUTO-ENTMCNC: 34.3 G/DL (ref 31.4–37.4)
MCV RBC AUTO: 79 FL (ref 82–98)
MONOCYTES # BLD AUTO: 0.83 THOUSAND/UL (ref 0–1.22)
MONOCYTES NFR BLD: 7 % (ref 4–12)
NEUTROPHILS # BLD MANUAL: 10.7 THOUSAND/UL (ref 1.85–7.62)
NEUTS SEG NFR BLD AUTO: 90 % (ref 43–75)
NITRITE UR QL STRIP: POSITIVE
NON-SQ EPI CELLS URNS QL MICRO: ABNORMAL /HPF
PH UR STRIP.AUTO: 6 [PH]
PLATELET # BLD AUTO: 181 THOUSANDS/UL (ref 149–390)
PLATELET BLD QL SMEAR: ADEQUATE
PMV BLD AUTO: 11.4 FL (ref 8.9–12.7)
POTASSIUM SERPL-SCNC: 3.3 MMOL/L (ref 3.5–5.3)
PROT SERPL-MCNC: 8.2 G/DL (ref 6.4–8.4)
PROT UR STRIP-MCNC: NEGATIVE MG/DL
RBC # BLD AUTO: 4.53 MILLION/UL (ref 3.81–5.12)
RBC #/AREA URNS AUTO: ABNORMAL /HPF
RBC MORPH BLD: NORMAL
RSV RNA RESP QL NAA+PROBE: NEGATIVE
S PYO DNA THROAT QL NAA+PROBE: NOT DETECTED
SARS-COV-2 RNA RESP QL NAA+PROBE: POSITIVE
SODIUM SERPL-SCNC: 137 MMOL/L (ref 135–147)
SP GR UR STRIP.AUTO: <=1.005 (ref 1–1.03)
UROBILINOGEN UR QL STRIP.AUTO: 0.2 E.U./DL
WBC # BLD AUTO: 11.89 THOUSAND/UL (ref 4.31–10.16)
WBC #/AREA URNS AUTO: ABNORMAL /HPF
WBC CLUMPS # UR AUTO: ABNORMAL /UL

## 2023-07-30 PROCEDURE — 96361 HYDRATE IV INFUSION ADD-ON: CPT

## 2023-07-30 PROCEDURE — 85027 COMPLETE CBC AUTOMATED: CPT | Performed by: EMERGENCY MEDICINE

## 2023-07-30 PROCEDURE — 87077 CULTURE AEROBIC IDENTIFY: CPT | Performed by: EMERGENCY MEDICINE

## 2023-07-30 PROCEDURE — 0241U HB NFCT DS VIR RESP RNA 4 TRGT: CPT | Performed by: EMERGENCY MEDICINE

## 2023-07-30 PROCEDURE — 87086 URINE CULTURE/COLONY COUNT: CPT | Performed by: EMERGENCY MEDICINE

## 2023-07-30 PROCEDURE — 96375 TX/PRO/DX INJ NEW DRUG ADDON: CPT

## 2023-07-30 PROCEDURE — 93005 ELECTROCARDIOGRAM TRACING: CPT

## 2023-07-30 PROCEDURE — 96365 THER/PROPH/DIAG IV INF INIT: CPT

## 2023-07-30 PROCEDURE — 83605 ASSAY OF LACTIC ACID: CPT | Performed by: EMERGENCY MEDICINE

## 2023-07-30 PROCEDURE — 81001 URINALYSIS AUTO W/SCOPE: CPT | Performed by: EMERGENCY MEDICINE

## 2023-07-30 PROCEDURE — 87651 STREP A DNA AMP PROBE: CPT | Performed by: EMERGENCY MEDICINE

## 2023-07-30 PROCEDURE — 87186 SC STD MICRODIL/AGAR DIL: CPT | Performed by: EMERGENCY MEDICINE

## 2023-07-30 PROCEDURE — 85007 BL SMEAR W/DIFF WBC COUNT: CPT | Performed by: EMERGENCY MEDICINE

## 2023-07-30 PROCEDURE — 80053 COMPREHEN METABOLIC PANEL: CPT | Performed by: EMERGENCY MEDICINE

## 2023-07-30 PROCEDURE — 83735 ASSAY OF MAGNESIUM: CPT | Performed by: EMERGENCY MEDICINE

## 2023-07-30 PROCEDURE — 84484 ASSAY OF TROPONIN QUANT: CPT | Performed by: EMERGENCY MEDICINE

## 2023-07-30 PROCEDURE — 99283 EMERGENCY DEPT VISIT LOW MDM: CPT

## 2023-07-30 PROCEDURE — 71045 X-RAY EXAM CHEST 1 VIEW: CPT

## 2023-07-30 PROCEDURE — 36415 COLL VENOUS BLD VENIPUNCTURE: CPT | Performed by: EMERGENCY MEDICINE

## 2023-07-30 RX ORDER — CEPHALEXIN 500 MG/1
500 CAPSULE ORAL EVERY 12 HOURS SCHEDULED
Qty: 10 CAPSULE | Refills: 0 | Status: SHIPPED | OUTPATIENT
Start: 2023-07-30 | End: 2023-08-04

## 2023-07-30 RX ORDER — ONDANSETRON 4 MG/1
4 TABLET, ORALLY DISINTEGRATING ORAL EVERY 6 HOURS PRN
Qty: 12 TABLET | Refills: 0 | Status: SHIPPED | OUTPATIENT
Start: 2023-07-30

## 2023-07-30 RX ORDER — DEXAMETHASONE SODIUM PHOSPHATE 10 MG/ML
10 INJECTION, SOLUTION INTRAMUSCULAR; INTRAVENOUS ONCE
Status: COMPLETED | OUTPATIENT
Start: 2023-07-30 | End: 2023-07-30

## 2023-07-30 RX ORDER — CEFTRIAXONE 1 G/50ML
1000 INJECTION, SOLUTION INTRAVENOUS ONCE
Status: COMPLETED | OUTPATIENT
Start: 2023-07-30 | End: 2023-07-30

## 2023-07-30 RX ORDER — ACETAMINOPHEN 325 MG/1
650 TABLET ORAL ONCE
Status: COMPLETED | OUTPATIENT
Start: 2023-07-30 | End: 2023-07-30

## 2023-07-30 RX ORDER — KETOROLAC TROMETHAMINE 30 MG/ML
15 INJECTION, SOLUTION INTRAMUSCULAR; INTRAVENOUS ONCE
Status: COMPLETED | OUTPATIENT
Start: 2023-07-30 | End: 2023-07-30

## 2023-07-30 RX ADMIN — ACETAMINOPHEN 650 MG: 325 TABLET ORAL at 12:54

## 2023-07-30 RX ADMIN — CEFTRIAXONE 1000 MG: 1 INJECTION, SOLUTION INTRAVENOUS at 11:42

## 2023-07-30 RX ADMIN — SODIUM CHLORIDE 1000 ML: 0.9 INJECTION, SOLUTION INTRAVENOUS at 09:50

## 2023-07-30 RX ADMIN — DEXAMETHASONE SODIUM PHOSPHATE 10 MG: 10 INJECTION, SOLUTION INTRAMUSCULAR; INTRAVENOUS at 10:18

## 2023-07-30 RX ADMIN — KETOROLAC TROMETHAMINE 15 MG: 30 INJECTION, SOLUTION INTRAMUSCULAR at 09:52

## 2023-07-30 NOTE — ED PROVIDER NOTES
History  Chief Complaint   Patient presents with   • Fever     Pt states she started with a fever, chills, aches last night. Last tylenol approx 650      70-year-old female with no significant chronic past medical history, presents to the ED for evaluation of generalized diffuse body aches, fevers, chills, sore throat since yesterday. Patient is vaccinated against COVID and influenza. No other sick contacts noted. Symptoms felt worse today. Tylenol taken at 3 AM this morning. Patient came to the ED for further evaluation. History provided by:  Patient   used: No    Fever  Associated symptoms: fever and sore throat    Associated symptoms: no abdominal pain, no chest pain, no cough, no ear pain, no rash, no shortness of breath and no vomiting        Prior to Admission Medications   Prescriptions Last Dose Informant Patient Reported?  Taking?   celecoxib (CeleBREX) 200 mg capsule   No No   Sig: Take 1 capsule (200 mg total) by mouth daily (do not take Advil while on this medication, may take tylenol)   mupirocin (BACTROBAN) 2 % ointment   No No   Sig: APPLY TOPICALLY 3 (THREE) TIMES A DAY      Facility-Administered Medications: None       Past Medical History:   Diagnosis Date   • Asymptomatic bacteriuria 4/9/2020   • Chest tightness 3/31/2020   • COVID-19 virus infection 4/7/2020   • H/O antinuclear antibodies    • Hallucinations 4/8/2020   • Pneumonia due to COVID-19 virus 4/9/2020   • SIRS (systemic inflammatory response syndrome) ([address]) 3/31/2020   • Urinary tract infection     last assessed: 10/24/2013       Past Surgical History:   Procedure Laterality Date   • APPENDECTOMY         Family History   Problem Relation Age of Onset   • Diabetes Mother    • No Known Problems Father    • No Known Problems Sister    • No Known Problems Sister    • No Known Problems Maternal Grandmother    • No Known Problems Maternal Grandfather    • No Known Problems Paternal Grandmother    • No Known Problems Paternal Grandfather      I have reviewed and agree with the history as documented. E-Cigarette/Vaping   • E-Cigarette Use Never User      E-Cigarette/Vaping Substances     Social History     Tobacco Use   • Smoking status: Never   • Smokeless tobacco: Never   Vaping Use   • Vaping Use: Never used   Substance Use Topics   • Alcohol use: Not Currently   • Drug use: Not Currently       Review of Systems   Constitutional: Positive for chills and fever. HENT: Positive for sore throat. Negative for ear pain. Eyes: Negative for pain and visual disturbance. Respiratory: Negative for cough and shortness of breath. Cardiovascular: Negative for chest pain and palpitations. Gastrointestinal: Negative for abdominal pain and vomiting. Genitourinary: Negative for dysuria and hematuria. Musculoskeletal: Negative for arthralgias and back pain. Skin: Negative for color change and rash. Neurological: Negative for seizures and syncope. All other systems reviewed and are negative. Physical Exam  Physical Exam  Vitals and nursing note reviewed. Constitutional:       General: She is not in acute distress. Appearance: She is well-developed. HENT:      Head: Normocephalic and atraumatic. Right Ear: Tympanic membrane normal.      Left Ear: Tympanic membrane normal.      Mouth/Throat:      Comments: Erythematous and mildly edematous posterior oropharynx without any exudates. No signs of airway compromise noted. Eyes:      Conjunctiva/sclera: Conjunctivae normal.   Cardiovascular:      Rate and Rhythm: Normal rate and regular rhythm. Heart sounds: No murmur heard. Pulmonary:      Effort: Pulmonary effort is normal. No respiratory distress. Breath sounds: Normal breath sounds. Abdominal:      Palpations: Abdomen is soft. Tenderness: There is no abdominal tenderness. Musculoskeletal:         General: No swelling. Cervical back: Neck supple.    Skin:     General: Skin is warm and dry. Capillary Refill: Capillary refill takes less than 2 seconds. Neurological:      Mental Status: She is alert.    Psychiatric:         Mood and Affect: Mood normal.         Vital Signs  ED Triage Vitals   Temperature Pulse Respirations Blood Pressure SpO2   07/30/23 0901 07/30/23 0901 07/30/23 0901 07/30/23 0901 07/30/23 0901   (!) 101 °F (38.3 °C) 103 20 (!) 184/85 97 %      Temp Source Heart Rate Source Patient Position - Orthostatic VS BP Location FiO2 (%)   07/30/23 0901 07/30/23 0901 07/30/23 0901 07/30/23 0901 --   Oral Monitor Lying Right arm       Pain Score       07/30/23 0952       7           Vitals:    07/30/23 0901 07/30/23 1139 07/30/23 1145 07/30/23 1200   BP: (!) 184/85 142/73 130/66 122/70   Pulse: 103 96 92 93   Patient Position - Orthostatic VS: Lying Lying           Visual Acuity  Visual Acuity    Flowsheet Row Most Recent Value   L Pupil Size (mm) 3   R Pupil Size (mm) 3          ED Medications  Medications   acetaminophen (TYLENOL) tablet 650 mg (has no administration in time range)   sodium chloride 0.9 % bolus 1,000 mL (0 mL Intravenous Stopped 7/30/23 1142)   ketorolac (TORADOL) injection 15 mg (15 mg Intravenous Given 7/30/23 0952)   dexamethasone (PF) (DECADRON) injection 10 mg (10 mg Intravenous Given 7/30/23 1018)   cefTRIAXone (ROCEPHIN) IVPB (premix in dextrose) 1,000 mg 50 mL (1,000 mg Intravenous New Bag 7/30/23 1142)       Diagnostic Studies  Results Reviewed     Procedure Component Value Units Date/Time    HS Troponin I 2hr [[id number]]  (Normal) Collected: 07/30/23 1148    Lab Status: Final result Specimen: Blood from Arm, Right Updated: 07/30/23 1226     hs TnI 2hr 7 ng/L      Delta 2hr hsTnI -1 ng/L     Urine Microscopic [[id number]]  (Abnormal) Collected: 07/30/23 1121    Lab Status: Final result Specimen: Urine, Clean Catch Updated: 07/30/23 1155     RBC, UA 0-1 /hpf      WBC, UA 10-20 /hpf      Epithelial Cells Occasional /hpf      Bacteria, UA Innumerable /hpf      WBC Clumps Occasional    Urine culture [[id number]] Collected: 07/30/23 1121    Lab Status: In process Specimen: Urine, Clean Catch Updated: 07/30/23 1154    HS Troponin I 4hr [[id number]]     Lab Status: No result Specimen: Blood     UA w Reflex to Microscopic w Reflex to Culture [[id number]]  (Abnormal) Collected: 07/30/23 1121    Lab Status: Final result Specimen: Urine, Clean Catch Updated: 07/30/23 1127     Color, UA Light Yellow     Clarity, UA Slightly Cloudy     Specific Gravity, UA <=1.005     pH, UA 6.0     Leukocytes, UA Large     Nitrite, UA Positive     Protein, UA Negative mg/dl      Glucose, UA Negative mg/dl      Ketones, UA Negative mg/dl      Urobilinogen, UA 0.2 E.U./dl      Bilirubin, UA Negative     Occult Blood, UA Trace-Intact    FLU/RSV/COVID - if FLU/RSV clinically relevant [[id number]]  (Abnormal) Collected: 07/30/23 0954    Lab Status: Final result Specimen: Nares from Nose Updated: 07/30/23 1106     SARS-CoV-2 Positive     INFLUENZA A PCR Negative     INFLUENZA B PCR Negative     RSV PCR Negative    Narrative:      FOR PEDIATRIC PATIENTS - copy/paste COVID Guidelines URL to browser: [email or web address]    SARS-CoV-2 assay is a Nucleic Acid Amplification assay intended for the  qualitative detection of nucleic acid from SARS-CoV-2 in nasopharyngeal  swabs. Results are for the presumptive identification of SARS-CoV-2 RNA. Positive results are indicative of infection with SARS-CoV-2, the virus  causing COVID-19, but do not rule out bacterial infection or co-infection  with other viruses. Laboratories within the [facility] and its  territories are required to report all positive results to the appropriate  public health authorities. Negative results do not preclude SARS-CoV-2  infection and should not be used as the sole basis for treatment or other  patient management decisions.  Negative results must be combined with  clinical observations, patient history, and epidemiological information. This test has not been FDA cleared or approved. This test has been authorized by FDA under an Emergency Use Authorization  (EUA). This test is only authorized for the duration of time the  declaration that circumstances exist justifying the authorization of the  emergency use of an in vitro diagnostic tests for detection of SARS-CoV-2  virus and/or diagnosis of COVID-19 infection under section 564(b)(1) of  the Act, 21 U. S.C. 689NXU-2(D)(0), unless the authorization is terminated  or revoked sooner. The test has been validated but independent review by FDA  and CLIA is pending. Test performed using [employer] GeneXpert: This RT-PCR assay targets N2,  a region unique to SARS-CoV-2. A conserved region in the E-gene was chosen  for pan-Sarbecovirus detection which includes SARS-CoV-2. According to CMS-2020-01-R, this platform meets the definition of high-throughput technology. RBC Morphology Reflex Test [[id number]] Collected: 07/30/23 0940    Lab Status: Final result Specimen: Blood from Arm, Right Updated: 07/30/23 1101    Strep A PCR [[id number]]  (Normal) Collected: 07/30/23 0954    Lab Status: Final result Specimen: Throat Updated: 07/30/23 1054     STREP A PCR Not Detected    HS Troponin 0hr (reflex protocol) [[id number]]  (Normal) Collected: 07/30/23 0940    Lab Status: Final result Specimen: Blood from Arm, Right Updated: 07/30/23 1012     hs TnI 0hr 8 ng/L     CBC and differential [[id number]]  (Abnormal) Collected: 07/30/23 0940    Lab Status: Final result Specimen: Blood from Arm, Right Updated: 07/30/23 1005     WBC 11.89 Thousand/uL      RBC 4.53 Million/uL      Hemoglobin 12.3 g/dL      Hematocrit 35.9 %      MCV 79 fL      MCH 27.2 pg      MCHC 34.3 g/dL      RDW 13.5 %      MPV 11.4 fL      Platelets 320 Thousands/uL     Narrative: This is an appended report.   These results have been appended to a previously verified report.     Manual Differential(PHLEBS Do Not Order) [[id number]]  (Abnormal) Collected: 07/30/23 0940    Lab Status: Final result Specimen: Blood from Arm, Right Updated: 07/30/23 1005     Segmented % 90 %      Lymphocytes % 3 %      Monocytes % 7 %      Eosinophils, % 0 %      Basophils % 0 %      Absolute Neutrophils 10.70 Thousand/uL      Lymphocytes Absolute 0.36 Thousand/uL      Monocytes Absolute 0.83 Thousand/uL      Eosinophils Absolute 0.00 Thousand/uL      Basophils Absolute 0.00 Thousand/uL      Total Counted --     RBC Morphology Normal     Platelet Estimate Adequate    Comprehensive metabolic panel [[id number]]  (Abnormal) Collected: 07/30/23 0940    Lab Status: Final result Specimen: Blood from Arm, Right Updated: 07/30/23 1004     Sodium 137 mmol/L      Potassium 3.3 mmol/L      Chloride 104 mmol/L      CO2 25 mmol/L      ANION GAP 8 mmol/L      BUN 12 mg/dL      Creatinine 0.88 mg/dL      Glucose 126 mg/dL      Calcium 9.6 mg/dL      AST 14 U/L      ALT 11 U/L      Alkaline Phosphatase 113 U/L      Total Protein 8.2 g/dL      Albumin 4.6 g/dL      Total Bilirubin 0.72 mg/dL      eGFR 68 ml/min/1.73sq m     Narrative:      [place] guidelines for Chronic Kidney Disease (CKD):   •  Stage 1 with normal or high GFR (GFR > 90 mL/min/1.73 square meters)  •  Stage 2 Mild CKD (GFR = 60-89 mL/min/1.73 square meters)  •  Stage 3A Moderate CKD (GFR = 45-59 mL/min/1.73 square meters)  •  Stage 3B Moderate CKD (GFR = 30-44 mL/min/1.73 square meters)  •  Stage 4 Severe CKD (GFR = 15-29 mL/min/1.73 square meters)  •  Stage 5 End Stage CKD (GFR <15 mL/min/1.73 square meters)  Note: GFR calculation is accurate only with a steady state creatinine    Magnesium [[id number]]  (Abnormal) Collected: 07/30/23 0940    Lab Status: Final result Specimen: Blood from Arm, Right Updated: 07/30/23 1004     Magnesium 1.8 mg/dL     Lactic acid, plasma (w/reflex if result > 2.0) [[id number]]  (Normal) Collected: 07/30/23 0940    Lab Status: Final result Specimen: Blood from Arm, Right Updated: 07/30/23 1004     LACTIC ACID 1.0 mmol/L     Narrative:      Result may be elevated if tourniquet was used during collection. XR chest 1 view portable    (Results Pending)              Procedures  ECG 12 Lead Documentation Only    Date/Time: 7/30/2023 9:05 AM    Performed by: Henrietta Lindsey DO  Authorized by: Henrietta Lindsey DO    Indications / Diagnosis:  Fever  ECG reviewed by me, the ED Provider: yes    Previous ECG:     Previous ECG:  Compared to current    Similarity:  No change    Comparison to cardiac monitor: Yes    Interpretation:     Interpretation: non-specific    Comments:      Sinus rhythm, rate monitor, normal axis, normal intervals, mild ST depressions noted throughout, low amplitude T waves noted throughout, nonspecific ST and T wave abnormalities that are new compared to previous EKG, previous EKG showed sinus bradycardia however today patient has sinus tachycardia. 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   1156 Discussed results of positive COVID with patient. Patient also has symptoms of UTI. Patient already started on empiric antibiotic. I discussed the risks and benefit of Paxlovid with patient. At this time patient would like to forego Paxlovid. Patient will discuss further about taking Paxlovid with her PCP should her symptoms worsens. Patient currently complains of some headache. Patient will be given Tylenol. Medical Decision Making  Obtain blood work, EKG, chest x-ray, viral swab  Give IV fluids, Decadron, antipyretic and continue to monitor patient for any worsening symptoms. Patient tested positive for COVID-19 infection.   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 Patient symptoms improved in the ED with IV fluid hydration, antipyretics, and pain medication. Patient's UA showed concern for UTI. Patient empirically started on antibiotic. At this time I discussed the risks and benefit of taking Paxlovid. Patient would like to defer taking Paxlovid at this time. If her symptoms worsen then she will reach out to her PCP regarding prescription for Paxlovid. At this time supportive care was discussed for her COVID-19 infection as well as UTI. Patient discharged home on Zofran. Nausea vomiting as well as over-the-counter Tylenol/ibuprofen for any fevers and chills. Patient given Keflex for her UTI. Patient is discharged home with follow-up to PCP. Close return instructions given to return to the ER for any worsening symptoms. Patient agrees with discharge plan. Patient well appearing at time of discharge. Please Note: Flu[employer] Direct voice recognition software may have been used in the creation of this document. Wrong words or sound a like substitutions may have occurred due to the inherent limitations of the voice software. Amount and/or Complexity of Data Reviewed  Labs: ordered. Radiology: ordered. Risk  OTC drugs. Prescription drug management.           Disposition  Final diagnoses:   COVID-19 virus infection   UTI (urinary tract infection)   Viral syndrome     Time reflects when diagnosis was documented in both MDM as applicable and the Disposition within this note     Time User Action Codes Description Comment    7/30/2023 12:28 PM Ever Cevallos Add [U07.1] COVID-19 virus infection     7/30/2023 12:28 PM Jose Burton Add [N39.0] UTI (urinary tract infection)     7/30/2023 12:28 PM Ever Cevallos Add [B34.9] Viral syndrome       ED Disposition     ED Disposition   Discharge    Condition   Stable    Date/Time   Sun Jul 30, 2023 12:28 PM    Comment Anthony Puri discharge to home/self care. Follow-up Information    None         Patient's Medications   Discharge Prescriptions    CEPHALEXIN (KEFLEX) 500 MG CAPSULE    Take 1 capsule (500 mg total) by mouth every 12 (twelve) hours for 5 days       Start Date: 7/30/2023 End Date: 8/4/2023       Order Dose: 500 mg       Quantity: 10 capsule    Refills: 0    ONDANSETRON (ZOFRAN-ODT) 4 MG DISINTEGRATING TABLET    Take 1 tablet (4 mg total) by mouth every 6 (six) hours as needed for nausea or vomiting       Start Date: 7/30/2023 End Date: --       Order Dose: 4 mg       Quantity: 12 tablet    Refills: 0       No discharge procedures on file.     PDMP Review     None          ED Provider  Electronically Signed by           Marin Chilel DO  07/30/23 2303

## 2023-07-31 ENCOUNTER — TELEPHONE (OUTPATIENT)
Dept: OBGYN CLINIC | Facility: CLINIC | Age: 68
End: 2023-07-31

## 2023-07-31 ENCOUNTER — TELEPHONE (OUTPATIENT)
Dept: FAMILY MEDICINE CLINIC | Facility: CLINIC | Age: 68
End: 2023-07-31

## 2023-07-31 DIAGNOSIS — U07.1 COVID-19: Primary | ICD-10-CM

## 2023-07-31 LAB
ATRIAL RATE: 102 BPM
P AXIS: 48 DEGREES
PR INTERVAL: 136 MS
QRS AXIS: 17 DEGREES
QRSD INTERVAL: 68 MS
QT INTERVAL: 342 MS
QTC INTERVAL: 445 MS
T WAVE AXIS: 43 DEGREES
VENTRICULAR RATE: 102 BPM

## 2023-07-31 PROCEDURE — 93010 ELECTROCARDIOGRAM REPORT: CPT | Performed by: INTERNAL MEDICINE

## 2023-07-31 RX ORDER — NIRMATRELVIR AND RITONAVIR 300-100 MG
3 KIT ORAL 2 TIMES DAILY
Qty: 30 TABLET | Refills: 0 | Status: SHIPPED | OUTPATIENT
Start: 2023-07-31 | End: 2023-08-05

## 2023-07-31 NOTE — TELEPHONE ENCOUNTER
----- Message from [facility] sent at 7/31/2023  8:43 AM EDT -----  Regarding: FW: Headache   Contact: [phone]    ----- Message -----  From: Toniann Rubinstein  Sent: 7/31/2023   8:40 AM EDT  To: [address] [place] [address] Clinical  Subject: Headache                                         Hi Dr Corky Rodriguez   When I was at ER yesterday the Doctor also asked me if I wanted this medication it start with a . ..p I think it's to shorten the duration of the covid but I said no because  she said may increase Diarrhea if i'm taking the antibiotic and that medicine stop with aurea BAINS She said if I changed my mind that I can ask my primary doctor to order for me. Can you order for me, please? At [facility] pharmacy.

## 2023-07-31 NOTE — TELEPHONE ENCOUNTER
7/31/2023 2:45 PM returned my call - reviewed ER visit    She is interested in starting Paxlovid  Onset of symptoms 7/29/23  Emergency use authorization discussed  Risks and benefits of medication discussed.       Message complete  Fatuma Amaya, DO

## 2023-07-31 NOTE — TELEPHONE ENCOUNTER
7/31/2023 12:31 PM called Jovita Potts regarding her message and having COVID 19  She is requesting paxlovid    Test positive on 7/30/23    I need to discuss the medication with her before prescribing it. Left message on her voicemail to call the office.    Alex Tom DO

## 2023-08-01 LAB
BACTERIA UR CULT: ABNORMAL
BACTERIA UR CULT: ABNORMAL

## 2023-08-03 ENCOUNTER — TELEPHONE (OUTPATIENT)
Age: 68
End: 2023-08-03

## 2023-08-03 NOTE — TELEPHONE ENCOUNTER
Doctor: Kelly Bryant Name: [facility]  CB#:    [phone] Radiology called to speak to clinical team regarding xray ordered by provider.

## 2023-08-05 DIAGNOSIS — M25.552 BILATERAL HIP PAIN: ICD-10-CM

## 2023-08-05 DIAGNOSIS — M25.551 BILATERAL HIP PAIN: ICD-10-CM

## 2023-08-05 RX ORDER — CELECOXIB 200 MG/1
CAPSULE ORAL
Qty: 30 CAPSULE | Refills: 3 | Status: SHIPPED | OUTPATIENT
Start: 2023-08-05

## 2023-08-23 ENCOUNTER — TELEPHONE (OUTPATIENT)
Age: 68
End: 2023-08-23

## 2023-08-23 NOTE — TELEPHONE ENCOUNTER
Caller: Patient    Doctor: Stacey Yun    Reason for call: Pt is sick and would like to reschedule her USGI hip injection.     Call back#: [phone]

## 2023-08-23 NOTE — TELEPHONE ENCOUNTER
Caller: Alethea Wright    Doctor: Sherice Lutz    Reason for call: Received the VM to r/s her USGI but she is at work will call back once she has her schedule     Call back#: [phone]

## 2023-11-28 ENCOUNTER — HOSPITAL ENCOUNTER (EMERGENCY)
Facility: HOSPITAL | Age: 68
Discharge: HOME | End: 2023-11-28
Attending: EMERGENCY MEDICINE
Payer: COMMERCIAL

## 2023-11-28 ENCOUNTER — APPOINTMENT (EMERGENCY)
Dept: RADIOLOGY | Facility: HOSPITAL | Age: 68
End: 2023-11-28
Payer: COMMERCIAL

## 2023-11-28 VITALS
HEART RATE: 72 BPM | DIASTOLIC BLOOD PRESSURE: 61 MMHG | RESPIRATION RATE: 18 BRPM | TEMPERATURE: 97.6 F | SYSTOLIC BLOOD PRESSURE: 141 MMHG | WEIGHT: 198.85 LBS | BODY MASS INDEX: 30.24 KG/M2 | OXYGEN SATURATION: 100 %

## 2023-11-28 DIAGNOSIS — R51.9 HEADACHE: Primary | ICD-10-CM

## 2023-11-28 LAB
ATRIAL RATE: 60 BPM
P AXIS: 36 DEGREES
PR INTERVAL: 158 MS
QRS AXIS: 8 DEGREES
QRSD INTERVAL: 70 MS
QT INTERVAL: 418 MS
QTC INTERVAL: 418 MS
T WAVE AXIS: 3 DEGREES
VENTRICULAR RATE: 60 BPM

## 2023-11-28 PROCEDURE — 99284 EMERGENCY DEPT VISIT MOD MDM: CPT

## 2023-11-28 PROCEDURE — 96375 TX/PRO/DX INJ NEW DRUG ADDON: CPT

## 2023-11-28 PROCEDURE — 70450 CT HEAD/BRAIN W/O DYE: CPT

## 2023-11-28 PROCEDURE — 99284 EMERGENCY DEPT VISIT MOD MDM: CPT | Performed by: EMERGENCY MEDICINE

## 2023-11-28 PROCEDURE — G1004 CDSM NDSC: HCPCS

## 2023-11-28 PROCEDURE — 96374 THER/PROPH/DIAG INJ IV PUSH: CPT

## 2023-11-28 PROCEDURE — 93005 ELECTROCARDIOGRAM TRACING: CPT

## 2023-11-28 PROCEDURE — 96361 HYDRATE IV INFUSION ADD-ON: CPT

## 2023-11-28 RX ORDER — KETOROLAC TROMETHAMINE 30 MG/ML
15 INJECTION, SOLUTION INTRAMUSCULAR; INTRAVENOUS ONCE
Status: COMPLETED | OUTPATIENT
Start: 2023-11-28 | End: 2023-11-28

## 2023-11-28 RX ORDER — ACETAMINOPHEN 325 MG/1
975 TABLET ORAL ONCE
Status: COMPLETED | OUTPATIENT
Start: 2023-11-28 | End: 2023-11-28

## 2023-11-28 RX ORDER — METOCLOPRAMIDE HYDROCHLORIDE 5 MG/ML
10 INJECTION INTRAMUSCULAR; INTRAVENOUS ONCE
Status: COMPLETED | OUTPATIENT
Start: 2023-11-28 | End: 2023-11-28

## 2023-11-28 RX ADMIN — ACETAMINOPHEN 975 MG: 325 TABLET ORAL at 01:03

## 2023-11-28 RX ADMIN — METOCLOPRAMIDE 10 MG: 5 INJECTION, SOLUTION INTRAMUSCULAR; INTRAVENOUS at 01:03

## 2023-11-28 RX ADMIN — KETOROLAC TROMETHAMINE 15 MG: 30 INJECTION, SOLUTION INTRAMUSCULAR at 01:03

## 2023-11-28 RX ADMIN — SODIUM CHLORIDE 1000 ML: 0.9 INJECTION, SOLUTION INTRAVENOUS at 01:03

## 2023-11-28 NOTE — DISCHARGE INSTRUCTIONS
Please follow-up with your primary care doctor. Your CT head was otherwise unremarkable here. Likely you may have had a headache which caused your symptoms. Please return to ER if you have any facial droop, weakness, difficulty with speech.

## 2023-11-28 NOTE — ED PROVIDER NOTES
History  Chief Complaint   Patient presents with    Headache     Patient c/o L sided headache that feels like tingling. States she also feels pain in jaw on L side. No hx of migraine or headaches. Pain/tingling began earlier tonight. Pt states she had an episode of brief dizziness/ lightheadedness while driving a few months ago and now this tonight and wants to be assessed. 62-year-old female no significant past medical history presenting to ED today with headache. Patient states that the headache started earlier today. She is also having some left-sided jaw pain. She came to the ED today because she says she does not have a history of headaches however she has had visits to our ER before for headaches however they were a long time ago. No chest pain or shortness of breath. She was concerned because the numbness that she is having the left side of her head and so she came in for evaluation. No visual changes. No weakness. No slurred speech. Prior to Admission Medications   Prescriptions Last Dose Informant Patient Reported?  Taking?   celecoxib (CeleBREX) 200 mg capsule   No No   Sig: TAKE 1 CAPSULE DAILY (DO NOT TAKE ADVIL WHILE ON THIS MEDICATION, MAY TAKE TYLENOL)   mupirocin (BACTROBAN) 2 % ointment   No No   Sig: APPLY TOPICALLY 3 (THREE) TIMES A DAY   ondansetron (ZOFRAN-ODT) 4 mg disintegrating tablet   No No   Sig: Take 1 tablet (4 mg total) by mouth every 6 (six) hours as needed for nausea or vomiting      Facility-Administered Medications: None       Past Medical History:   Diagnosis Date    Asymptomatic bacteriuria 4/9/2020    Chest tightness 3/31/2020    COVID-19 virus infection 4/7/2020    H/O antinuclear antibodies     Hallucinations 4/8/2020    Pneumonia due to COVID-19 virus 4/9/2020    SIRS (systemic inflammatory response syndrome) ([address]) 3/31/2020    Urinary tract infection     last assessed: 10/24/2013       Past Surgical History:   Procedure Laterality Date    APPENDECTOMY Family History   Problem Relation Age of Onset    Diabetes Mother     No Known Problems Father     No Known Problems Sister     No Known Problems Sister     No Known Problems Maternal Grandmother     No Known Problems Maternal Grandfather     No Known Problems Paternal Grandmother     No Known Problems Paternal Grandfather      I have reviewed and agree with the history as documented. E-Cigarette/Vaping    E-Cigarette Use Never User      E-Cigarette/Vaping Substances     Social History     Tobacco Use    Smoking status: Never    Smokeless tobacco: Never   Vaping Use    Vaping Use: Never used   Substance Use Topics    Alcohol use: Not Currently    Drug use: Not Currently       Review of Systems   Constitutional:  Negative for chills and fever. HENT:  Negative for hearing loss. Eyes:  Negative for visual disturbance. Respiratory:  Negative for shortness of breath. Cardiovascular:  Negative for chest pain. Gastrointestinal:  Negative for abdominal pain, constipation, diarrhea, nausea and vomiting. Genitourinary:  Negative for difficulty urinating. Musculoskeletal:  Negative for myalgias. Skin:  Negative for color change. Neurological:  Negative for dizziness. Psychiatric/Behavioral:  Negative for agitation. All other systems reviewed and are negative. Physical Exam  Physical Exam  Vitals and nursing note reviewed. Constitutional:       General: She is not in acute distress. Appearance: Normal appearance. She is well-developed. She is not ill-appearing. HENT:      Head: Normocephalic and atraumatic. Right Ear: External ear normal.      Left Ear: External ear normal.      Nose: Nose normal. No congestion. Mouth/Throat:      Mouth: Mucous membranes are moist.      Pharynx: Oropharynx is clear. No oropharyngeal exudate. Eyes:      General:         Right eye: No discharge. Left eye: No discharge. Extraocular Movements: Extraocular movements intact. Conjunctiva/sclera: Conjunctivae normal.      Pupils: Pupils are equal, round, and reactive to light. Cardiovascular:      Rate and Rhythm: Normal rate and regular rhythm. Heart sounds: Normal heart sounds. No murmur heard. No friction rub. No gallop. Pulmonary:      Effort: Pulmonary effort is normal. No respiratory distress. Breath sounds: Normal breath sounds. No stridor. No wheezing. Abdominal:      General: Bowel sounds are normal. There is no distension. Palpations: Abdomen is soft. Tenderness: There is no abdominal tenderness. Musculoskeletal:         General: No swelling. Normal range of motion. Cervical back: Normal range of motion and neck supple. No rigidity. Skin:     General: Skin is warm and dry. Capillary Refill: Capillary refill takes less than 2 seconds. Neurological:      General: No focal deficit present. Mental Status: She is alert and oriented to person, place, and time. Mental status is at baseline. Cranial Nerves: No cranial nerve deficit. Sensory: No sensory deficit. Motor: No weakness.       Coordination: Coordination normal.      Gait: Gait normal.   Psychiatric:         Mood and Affect: Mood normal.         Behavior: Behavior normal.         Vital Signs  ED Triage Vitals [11/28/23 0046]   Temperature Pulse Respirations Blood Pressure SpO2   97.6 °F (36.4 °C) 72 18 141/61 100 %      Temp Source Heart Rate Source Patient Position - Orthostatic VS BP Location FiO2 (%)   Oral Monitor -- -- --      Pain Score       --           Vitals:    11/28/23 0046   BP: 141/61   Pulse: 72         Visual Acuity      ED Medications  Medications   sodium chloride 0.9 % bolus 1,000 mL (0 mL Intravenous Stopped 11/28/23 0207)   ketorolac (TORADOL) injection 15 mg (15 mg Intravenous Given 11/28/23 0103)   acetaminophen (TYLENOL) tablet 975 mg (975 mg Oral Given 11/28/23 0103)   metoclopramide (REGLAN) injection 10 mg (10 mg Intravenous Given 11/28/23 0103)       Diagnostic Studies  Results Reviewed       None                   CT head without contrast   Final Result by Carolyn Johnson MD (11/28 0150)      No acute intracranial abnormality. Workstation performed: [id number]                    Procedures  Procedures         ED Course  ED Course as of 11/28/23 0258 Tue Nov 28, 2023 0105 Procedure Note: EKG  Date/Time: 11/28/23 1:05 AM   Interpreted by: Gregory Sandoval   Indications / Diagnosis: CP  ECG reviewed by me, the ED Provider: yes   The EKG demonstrates:  Rhythm: normal sinus  Intervals: normal intervals  Axis: normal axis  QRS/Blocks: normal QRS  ST Changes: No acute ST Changes, no STD/BAKARI. SBIRT 22yo+      Flowsheet Row Most Recent Value   Initial Alcohol Screen: US AUDIT-C     1. How often do you have a drink containing alcohol? 0 Filed at: 11/28/2023 0047   2. How many drinks containing alcohol do you have on a typical day you are drinking? 0 Filed at: 11/28/2023 0047   3b. FEMALE Any Age, or MALE 65+: How often do you have 4 or more drinks on one occassion? 0 Filed at: 11/28/2023 0047   Audit-C Score 0 Filed at: 11/28/2023 6101   ROSEANNA: How many times in the past year have you. .. Used an illegal drug or used a prescription medication for non-medical reasons? Never Filed at: 11/28/2023 0047                      Medical Decision Making  46-year-old female presenting to ED today with headache and numbness. At this time her neuro exam is otherwise reassuring. I think likely she is having complex migraine. Will treat her with a migraine cocktail here. Given her age and given her fact that she does not member having headaches like this before in the past we will do a CT head without contrast to evaluate for masses. CT otherwise unremarkable. Patient is improved with headache cocktail. Encouraged outpatient follow-up with primary care provider.   Strict return to ER precautions including stroke signs were discussed with patient and patient was discharged home. Amount and/or Complexity of Data Reviewed  Radiology: ordered. Risk  OTC drugs. Prescription drug management. Disposition  Final diagnoses:   Headache     Time reflects when diagnosis was documented in both MDM as applicable and the Disposition within this note       Time User Action Codes Description Comment    11/28/2023  2:04 AM Guillermo Covarrubias Add [R51.9] Headache           ED Disposition       ED Disposition   Discharge    Condition   Stable    Date/Time   Tue Nov 28, 2023 1002 [facility] discharge to home/self care. Follow-up Information       Follow up With Specialties Details Why Contact Info Additional Information    Yolanda Webb DO Family Medicine Schedule an appointment as soon as possible for a visit in 2 days For follow-up with your primary care doctor [address] [facility] Emergency Department Emergency Medicine Go to  If symptoms worsen, As needed [address] [address]  1060 [facility] Emergency Department, [address], [address]            Discharge Medication List as of 11/28/2023  2:07 AM        CONTINUE these medications which have NOT CHANGED    Details   celecoxib (CeleBREX) 200 mg capsule TAKE 1 CAPSULE DAILY (DO NOT TAKE ADVIL WHILE ON THIS MEDICATION, MAY TAKE TYLENOL), Normal      mupirocin (BACTROBAN) 2 % ointment APPLY TOPICALLY 3 (THREE) TIMES A DAY, Starting Fri 7/7/2023, Normal      ondansetron (ZOFRAN-ODT) 4 mg disintegrating tablet Take 1 tablet (4 mg total) by mouth every 6 (six) hours as needed for nausea or vomiting, Starting Sun 7/30/2023, Normal             No discharge procedures on file.     PDMP Review       None            ED Provider  Electronically Signed by             Guillermo Covarrubias, MD  11/28/23 9653

## 2024-04-08 ENCOUNTER — OFFICE VISIT (OUTPATIENT)
Dept: FAMILY MEDICINE CLINIC | Facility: CLINIC | Age: 69
End: 2024-04-08
Payer: MEDICARE

## 2024-04-08 VITALS
TEMPERATURE: 97.7 F | DIASTOLIC BLOOD PRESSURE: 82 MMHG | HEART RATE: 64 BPM | BODY MASS INDEX: 29.52 KG/M2 | WEIGHT: 194.8 LBS | HEIGHT: 68 IN | RESPIRATION RATE: 16 BRPM | SYSTOLIC BLOOD PRESSURE: 140 MMHG

## 2024-04-08 DIAGNOSIS — M25.551 BILATERAL HIP PAIN: ICD-10-CM

## 2024-04-08 DIAGNOSIS — M25.552 BILATERAL HIP PAIN: ICD-10-CM

## 2024-04-08 DIAGNOSIS — R30.0 DYSURIA: Primary | ICD-10-CM

## 2024-04-08 LAB
SL AMB  POCT GLUCOSE, UA: NEGATIVE
SL AMB LEUKOCYTE ESTERASE,UA: NEGATIVE
SL AMB POCT BILIRUBIN,UA: NEGATIVE
SL AMB POCT BLOOD,UA: NEGATIVE
SL AMB POCT CLARITY,UA: CLEAR
SL AMB POCT COLOR,UA: YELLOW
SL AMB POCT KETONES,UA: NEGATIVE
SL AMB POCT NITRITE,UA: NEGATIVE
SL AMB POCT PH,UA: 6.5
SL AMB POCT SPECIFIC GRAVITY,UA: 1.02
SL AMB POCT URINE PROTEIN: NORMAL
SL AMB POCT UROBILINOGEN: 0.2

## 2024-04-08 PROCEDURE — 81003 URINALYSIS AUTO W/O SCOPE: CPT | Performed by: NURSE PRACTITIONER

## 2024-04-08 PROCEDURE — 99213 OFFICE O/P EST LOW 20 MIN: CPT | Performed by: NURSE PRACTITIONER

## 2024-04-08 PROCEDURE — 87086 URINE CULTURE/COLONY COUNT: CPT | Performed by: NURSE PRACTITIONER

## 2024-04-08 NOTE — PATIENT INSTRUCTIONS
Supportive care discussed and advised.  Advised to RTO for any worsening and no improvement.   Follow up for no improvement and worsening of conditions.  Patient advised and educated when to see immediate medical care.

## 2024-04-08 NOTE — PROGRESS NOTES
"Assessment/Plan:  Advised to drink plenty of fluids and will follow with urine culture results.    1. Dysuria  -     POCT urine dip auto non-scope  -     Urine culture      Patient Instructions:  Supportive care discussed and advised.  Advised to RTO for any worsening and no improvement.   Follow up for no improvement and worsening of conditions.  Patient advised and educated when to see immediate medical care.    Return in about 1 month (around 5/8/2024) for Annual physical.      Future Appointments   Date Time Provider Department Center   4/8/2024  2:00 PM TEDDY Hayden [facility]   4/15/2024  3:45 PM Maryellen Solorio, PT [place] [facility]           Subjective:      Patient ID: Anais Crenshaw is a 68 y.o. female.    Chief Complaint   Patient presents with   • Possible UTI     Burning sensation after urination   YC         Vitals:  /82   Pulse 64   Temp 97.7 °F (36.5 °C) (Tympanic)   Resp 16   Ht 5' 8\" (1.727 m)   Wt 88.4 kg (194 lb 12.8 oz)   BMI 29.62 kg/m²     HPI  Patient stated that started with discomfort with urination. Denies abdominal pain, fever, chills, hematuria, and vaginal bleeding. Denies being sexually active.           PHQ-2/9 Depression Screening    Little interest or pleasure in doing things: 0 - not at all  Feeling down, depressed, or hopeless: 0 - not at all  PHQ-2 Score: 0  PHQ-2 Interpretation: Negative depression screen             The following portions of the patient's history were reviewed and updated as appropriate: allergies, current medications, past family history, past medical history, past social history, past surgical history and problem list.      Review of Systems   Constitutional:  Negative for chills, diaphoresis, fatigue, fever and unexpected weight change.   Respiratory: Negative.     Cardiovascular: Negative.    Gastrointestinal:  Negative for abdominal pain, nausea and vomiting.   Genitourinary:  Positive for dysuria. Negative for decreased " urine volume, difficulty urinating, flank pain, frequency, genital sores, hematuria and urgency.   Skin: Negative.    Neurological:  Negative for dizziness and headaches.         Objective:    Social History     Tobacco Use   Smoking Status Never   • Passive exposure: Never   Smokeless Tobacco Never       Allergies: No Known Allergies      Current Outpatient Medications   Medication Sig Dispense Refill   • celecoxib (CeleBREX) 200 mg capsule TAKE 1 CAPSULE DAILY (DO NOT TAKE ADVIL WHILE ON THIS MEDICATION, MAY TAKE TYLENOL) 30 capsule 3   • mupirocin (BACTROBAN) 2 % ointment APPLY TOPICALLY 3 (THREE) TIMES A DAY (Patient not taking: Reported on 4/8/2024) 22 g 0   • ondansetron (ZOFRAN-ODT) 4 mg disintegrating tablet Take 1 tablet (4 mg total) by mouth every 6 (six) hours as needed for nausea or vomiting (Patient not taking: Reported on 4/8/2024) 12 tablet 0     No current facility-administered medications for this visit.          Physical Exam  Vitals reviewed.   Constitutional:       Appearance: She is well-developed.   Cardiovascular:      Rate and Rhythm: Normal rate and regular rhythm.      Heart sounds: Normal heart sounds.   Pulmonary:      Effort: Pulmonary effort is normal.      Breath sounds: Normal breath sounds.   Abdominal:      General: Bowel sounds are normal.      Palpations: Abdomen is soft.      Tenderness: There is no abdominal tenderness.   Skin:     General: Skin is warm and dry.   Neurological:      Mental Status: She is alert and oriented to person, place, and time.   Psychiatric:         Behavior: Behavior normal.         Thought Content: Thought content normal.         Judgment: Judgment normal.               Recent Results (from the past 24 hour(s))   POCT urine dip auto non-scope    Collection Time: 04/08/24  1:54 PM   Result Value Ref Range     COLOR,UA Yellow     CLARITY,UA Clear     SPECIFIC GRAVITY,UA 1.025      PH,UA 6.5     LEUKOCYTE ESTERASE,UA Negative     NITRITE,UA Negative      GLUCOSE, UA Negative     KETONES,UA Negative     BILIRUBIN,UA Negative     BLOOD,UA Negative     POCT URINE PROTEIN Negaitve     SL AMB POCT UROBILINOGEN 0.2            TEDDY Hayden

## 2024-04-09 RX ORDER — CELECOXIB 200 MG/1
CAPSULE ORAL
Qty: 30 CAPSULE | Refills: 5 | Status: SHIPPED | OUTPATIENT
Start: 2024-04-09

## 2024-04-10 LAB — BACTERIA UR CULT: NORMAL

## 2024-04-18 ENCOUNTER — EVALUATION (OUTPATIENT)
Dept: PHYSICAL THERAPY | Facility: CLINIC | Age: 69
End: 2024-04-18
Payer: MEDICARE

## 2024-04-18 DIAGNOSIS — M25.561 PAIN IN BOTH KNEES, UNSPECIFIED CHRONICITY: ICD-10-CM

## 2024-04-18 DIAGNOSIS — M25.562 PAIN IN BOTH KNEES, UNSPECIFIED CHRONICITY: ICD-10-CM

## 2024-04-18 DIAGNOSIS — M16.10 HIP ARTHRITIS: Primary | ICD-10-CM

## 2024-04-18 PROCEDURE — 97110 THERAPEUTIC EXERCISES: CPT | Performed by: PHYSICAL THERAPIST

## 2024-04-18 PROCEDURE — 97161 PT EVAL LOW COMPLEX 20 MIN: CPT | Performed by: PHYSICAL THERAPIST

## 2024-04-18 NOTE — PROGRESS NOTES
PT Evaluation     Today's date: 2024  Patient name: Anais Crenshaw  : 1955  MRN: [id number]  Referring provider: Clint Mata PA-C  Dx:   Encounter Diagnosis     ICD-10-CM    1. Hip arthritis  M16.10 Ambulatory Referral to Physical Therapy      2. Pain in both knees, unspecified chronicity  M25.561 Ambulatory Referral to Physical Therapy    M25.562                      Assessment  Assessment details: Anais Crenshaw is a 68 y.o. female who presents with pain, decreased strength, decreased ROM, and ambulatory dysfunction. Due to these impairments, patient has difficulty performing ADL's, work-related activities, ambulation, lifting/carrying. Patient's clinical presentation is consistent with their referring diagnosis of Pain in both knees, unspecified chronicity  Plan: Ambulatory Referral to Physical Therapy    Hip arthritis  Plan: Ambulatory Referral to Physical Therapy  . Patient has been educated in home exercise program and plan of care. Patient would benefit from skilled physical therapy services to address their aforementioned functional limitations and progress towards prior level of function and independence with home exercise program.     Impairments: abnormal gait, abnormal or restricted ROM, activity intolerance, impaired physical strength, lacks appropriate home exercise program, pain with function, weight-bearing intolerance, poor posture  and poor body mechanics  Understanding of Dx/Px/POC: good   Prognosis: fair    Goals  Short Term Goals to be accomplished in 3 weeks:  STG1: Pt will be I with HEP  STG2: Pt will demo 50% inc in hip AROM  STG3: Pt will demo 1/2 MMT strength in knee   STG4: Pt will amb community distance without gait deviates due to pain     Long Term Goals to be accomplished in 6 weeks:   LTG1: Pt will demo knee strength WNL to return to PLOF  LTG2: Pt will demo hip AROM WNL to minimize gait deviations  LTG3: Pt will return to household ADLs and work duties pain free as  per PLOF      Plan  Plan details:  HEP development, stretching, strengthening, A/AA/PROM, joint mobilizations, posture education, STM/MI as needed to reduce muscle tension, muscle reeducation, PLOC discussed and agreed upon with patient.      Patient would benefit from: PT eval and skilled physical therapy  Planned modality interventions: cryotherapy and thermotherapy: hydrocollator packs  Planned therapy interventions: manual therapy, neuromuscular re-education, self care, therapeutic activities, therapeutic exercise and home exercise program  Frequency: 2x week  Duration in weeks: 6  Treatment plan discussed with: patient        Subjective Evaluation    History of Present Illness  Mechanism of injury: Pt saw her MD and was told she has arthritis in both knees and hips, her knees are not very painful. She is limited in reaching the floor because of her knees, she depends on her back more for that. She feels her knees are limiting in this way, but she is unsure. Pt taking celebrex which she feels is helpful. Her symptoms have been happening for over a year. Pt works, she is busy, she has pain intermittently throughout her day, but does not necessarily stop her from activity. Pt is a CNA at [employer], work shift 8 hours. Car xfers can be challenging to her, not necessarily painful.     She reports she had back pain several days ago but no outstanding history. R symptoms usually a little more pronounced than L. Morning more difficulty than after her day has been going.     Pt goals: Return to bending to floor comfortably. Pt concerned this will get worse.   Quality of life: good    Pain  At worst pain ratin          Objective     Lumbar Screen  Lumbar range of motion within normal limits with the following exceptions:Flexion mod loss painful  Ext Mod loss pain free  R SGIS WFL  L SGIS WFL    Neurological Testing     Sensation     Hip   Left Hip   Intact: light touch    Right Hip   Intact: light  touch    Additional Neurological Details  Myotomes WNL      Active Range of Motion   Left Hip   Flexion: 85 degrees   Extension: -5 degrees   Abduction: 25 degrees     Right Hip   Flexion: 85 degrees   Extension: -5 degrees   Abduction: 25 degrees     Additional Active Range of Motion Details  B knee flexion 120 deg, stiffness    Passive Range of Motion   Left Hip   Flexion: 90 degrees   Extension: 3 degrees   Abduction: 25 degrees   External rotation (90/90): 3 degrees   Internal rotation (90/90): 0 degrees     Right Hip   Flexion: 90 degrees   Extension: 2 degrees   Abduction: 25 degrees   External rotation (90/90): 3 degrees   Internal rotation (90/90): 0 degrees     Additional Passive Range of Motion Details  Very firm end feel all directions, mild pain with flexion    Strength/Myotome Testing     Left Hip   Planes of Motion   Flexion: 4    Right Hip   Planes of Motion   Flexion: 4    Additional Strength Details  Quadriceps 4+/5 MMT B  Hamstrings 4-/5 MMT B    General Comments:      Hip Comments   Functional mob:    Lifting technique: Unable to reach floor while using legs, mid range squat with inc lumbar flexion    Gait: NBOS, B short stride phase, slow lilliana, flat foot, mild hip/trunk flexion           NO AUTH REQUIRED  Precautions: Standard.      Visit 1       4/18/24                           Neuro Re-Ed                                                        Ther Ex       SKTC Towel self OP 5x5s      Hip flexor lunge str 5x5s      KELLY x5                                         Ther Activity                     Gait Training                     Modalities

## 2024-04-24 ENCOUNTER — TELEPHONE (OUTPATIENT)
Age: 69
End: 2024-04-24

## 2024-04-24 DIAGNOSIS — Z12.11 COLON CANCER SCREENING: Primary | ICD-10-CM

## 2024-04-24 NOTE — TELEPHONE ENCOUNTER
Patient is calling to request an order for routine/preventative colonoscopy her last one was completed over 10 years ago

## 2024-04-25 ENCOUNTER — APPOINTMENT (OUTPATIENT)
Dept: PHYSICAL THERAPY | Facility: CLINIC | Age: 69
End: 2024-04-25
Payer: MEDICARE

## 2024-04-26 ENCOUNTER — PREP FOR PROCEDURE (OUTPATIENT)
Age: 69
End: 2024-04-26

## 2024-04-26 ENCOUNTER — TELEPHONE (OUTPATIENT)
Age: 69
End: 2024-04-26

## 2024-04-26 DIAGNOSIS — Z12.11 SCREENING FOR COLON CANCER: Primary | ICD-10-CM

## 2024-04-26 NOTE — TELEPHONE ENCOUNTER
04/26/24  Screened by: Alyssia Yee    Referring Provider Dr. Violetta Orr    Pre- Screening:     There is no height or weight on file to calculate BMI.  Has patient been referred for a routine screening Colonoscopy? yes  Is the patient between 45-75 years old? yes      Previous Colonoscopy yes   If yes:    Date: 7/23/2014    Facility: [facility]    Reason:       Does the patient want to see a Gastroenterologist prior to their procedure OR are they having any GI symptoms? no    Has the patient been hospitalized or had abdominal surgery in the past 6 months? no    Does the patient use supplemental oxygen? no    Does the patient take Coumadin, Lovenox, Plavix, Elliquis, Xarelto, or other blood thinning medication? no    Has the patient had a stroke, cardiac event, or stent placed in the past year? no      If patient is between 45yrs - 49yrs, please advise patient that we will have to confirm benefits & coverage with their insurance company for a routine screening colonoscopy.

## 2024-04-29 ENCOUNTER — TELEPHONE (OUTPATIENT)
Age: 69
End: 2024-04-29

## 2024-04-29 DIAGNOSIS — M25.559 HIP PAIN, UNSPECIFIED LATERALITY: Primary | ICD-10-CM

## 2024-04-29 NOTE — TELEPHONE ENCOUNTER
Patient calling back in regarding her call from earlier.  She wants to know if she can increase her dose for Celebrex for the pain.    Please advise

## 2024-04-29 NOTE — TELEPHONE ENCOUNTER
She is on the highest dose of Celebrex.  Since she is still having pain in the neck step would be to follow-up with an orthopedist. I put in an order for her to see Dr. Rawls  Thank you,  Violetta Orr, DO

## 2024-04-29 NOTE — TELEPHONE ENCOUNTER
"Left message on machine for patient to call office back. If the patient returns call, please inform her of Dr Orr's message below regarding this.     \"She is on the highest dose of Celebrex.  Since she is still having pain in the neck step would be to follow-up with an orthopedist. I put in an order for her to see Dr. Rawls  Thank you,  Violetta Orr, DO \"     Alley Saenz LPN    "

## 2024-04-29 NOTE — TELEPHONE ENCOUNTER
Patient called and stated she is scheduled for an AWV on 7/8.  She has been taking Celebrex 200 mg, 1 capsule daily for arthritis pain in her hip and knees.  She feels that it does not help as well as it used to, and takes 1 capsule in the am and 1 capsule in the pm on some days.  She feels as if her body is getting used to the dosage and would like to know if there is a higher dose she can try or if something else is recommended.

## 2024-05-02 ENCOUNTER — OFFICE VISIT (OUTPATIENT)
Dept: PHYSICAL THERAPY | Facility: CLINIC | Age: 69
End: 2024-05-02
Payer: MEDICARE

## 2024-05-02 ENCOUNTER — APPOINTMENT (OUTPATIENT)
Dept: RADIOLOGY | Facility: CLINIC | Age: 69
End: 2024-05-02
Payer: MEDICARE

## 2024-05-02 ENCOUNTER — TELEPHONE (OUTPATIENT)
Age: 69
End: 2024-05-02

## 2024-05-02 ENCOUNTER — OFFICE VISIT (OUTPATIENT)
Dept: OBGYN CLINIC | Facility: CLINIC | Age: 69
End: 2024-05-02
Payer: MEDICARE

## 2024-05-02 VITALS
DIASTOLIC BLOOD PRESSURE: 74 MMHG | WEIGHT: 195.4 LBS | HEART RATE: 72 BPM | SYSTOLIC BLOOD PRESSURE: 149 MMHG | HEIGHT: 68 IN | BODY MASS INDEX: 29.61 KG/M2

## 2024-05-02 DIAGNOSIS — M16.0 BILATERAL PRIMARY OSTEOARTHRITIS OF HIP: Primary | ICD-10-CM

## 2024-05-02 DIAGNOSIS — M25.559 HIP PAIN, UNSPECIFIED LATERALITY: ICD-10-CM

## 2024-05-02 DIAGNOSIS — M25.552 BILATERAL HIP PAIN: ICD-10-CM

## 2024-05-02 DIAGNOSIS — M25.562 PAIN IN BOTH KNEES, UNSPECIFIED CHRONICITY: Primary | ICD-10-CM

## 2024-05-02 DIAGNOSIS — M25.551 BILATERAL HIP PAIN: ICD-10-CM

## 2024-05-02 DIAGNOSIS — M16.10 HIP ARTHRITIS: ICD-10-CM

## 2024-05-02 DIAGNOSIS — M25.561 PAIN IN BOTH KNEES, UNSPECIFIED CHRONICITY: Primary | ICD-10-CM

## 2024-05-02 PROCEDURE — 97140 MANUAL THERAPY 1/> REGIONS: CPT

## 2024-05-02 PROCEDURE — 99214 OFFICE O/P EST MOD 30 MIN: CPT | Performed by: ORTHOPAEDIC SURGERY

## 2024-05-02 PROCEDURE — 97110 THERAPEUTIC EXERCISES: CPT

## 2024-05-02 PROCEDURE — 73522 X-RAY EXAM HIPS BI 3-4 VIEWS: CPT

## 2024-05-02 NOTE — H&P (VIEW-ONLY)
Assessment/Plan:  1. Bilateral primary osteoarthritis of hip        2. Bilateral hip pain  Ambulatory referral to Orthopedic Surgery    XR hips bilateral 3-4 vw w pelvis if performed        Scribe Attestation      I,:  Urban Garcia PA-C am acting as a scribe while in the presence of the attending physician.:       I,:  Fabrizio Rawls, DO personally performed the services described in this documentation    as scribed in my presence.:           Anais is a pleasant 68-year-old female presenting today for initial evaluation of her activity related bilateral hip pain.  Updated imaging today demonstrates severe end-stage degenerative changes of both hips.  We had a long discussion with her today that she would ultimately require total hip arthroplasties.  She may continue physical therapy, but it is unlikely that she will significantly improve her range of motion as she has a mechanical problem with both hips.  We discussed that she would like to exhaust all conservative treatment before considering surgery.  Therefore, we referred her to spine and pain for bilateral fluoroscopic guided intra-articular hip injections.  She is not diabetic, so she may undergo the injections at the same time.  She understands that they do not provide sufficient relief, she will be candidate for total hip arthroplasty.  Will plan to see her back pending efficacy of the injections.  She expressed understanding all of her questions were addressed today    Subjective: Bilateral hip pain    Patient ID: Anais Crenshaw is a 68 y.o. female presenting today for evaluation of her bilateral hips.  She denies any history of injury or surgery to her hips or problems as a child.  She did see our colleague, Dr. Gallegos, for knee pain in July of last year and it was felt that her pain is likely referred from her hips.  She reports up to 6 out of 10 pain on an intermittent basis.  It is worse getting in and out of a car.  She has difficulty  bending and putting on her shoes and socks in the morning.  She has difficulty going up and down stairs.  She is still working as a CNA at [employer].  She is working with physical therapy now in an effort to improve her flexibility.  She locates pain primarily in the groin radiating down to the medial knee.  She does not have any back pain or paresthesias in either lower extremity.  Her right hip bothers her more than her left    Review of Systems   Constitutional:  Positive for activity change.   HENT: Negative.     Eyes: Negative.    Respiratory: Negative.     Cardiovascular: Negative.    Gastrointestinal: Negative.    Endocrine: Negative.    Genitourinary: Negative.    Musculoskeletal:  Positive for arthralgias, gait problem, joint swelling and myalgias.   Skin: Negative.    Allergic/Immunologic: Negative.    Hematological: Negative.    Psychiatric/Behavioral: Negative.           Past Medical History:   Diagnosis Date    Asymptomatic bacteriuria 4/9/2020    Chest tightness 3/31/2020    COVID-19 virus infection 4/7/2020    H/O antinuclear antibodies     Hallucinations 4/8/2020    Pneumonia due to COVID-19 virus 4/9/2020    SIRS (systemic inflammatory response syndrome) (HCC) 3/31/2020    Urinary tract infection     last assessed: 10/24/2013       Past Surgical History:   Procedure Laterality Date    APPENDECTOMY         Family History   Problem Relation Age of Onset    Diabetes Mother     No Known Problems Father     No Known Problems Sister     No Known Problems Sister     No Known Problems Maternal Grandmother     No Known Problems Maternal Grandfather     No Known Problems Paternal Grandmother     No Known Problems Paternal Grandfather        Social History     Occupational History    Not on file   Tobacco Use    Smoking status: Never     Passive exposure: Never    Smokeless tobacco: Never   Vaping Use    Vaping status: Never Used   Substance and Sexual Activity    Alcohol use: Not Currently    Drug use: Not  Currently    Sexual activity: Not Currently     Partners: Male         Current Outpatient Medications:     celecoxib (CeleBREX) 200 mg capsule, TAKE 1 CAPSULE DAILY (DO NOT TAKE ADVIL WHILE ON THIS MEDICATION, MAY TAKE TYLENOL), Disp: 30 capsule, Rfl: 5    mupirocin (BACTROBAN) 2 % ointment, APPLY TOPICALLY 3 (THREE) TIMES A DAY (Patient not taking: Reported on 4/8/2024), Disp: 22 g, Rfl: 0    ondansetron (ZOFRAN-ODT) 4 mg disintegrating tablet, Take 1 tablet (4 mg total) by mouth every 6 (six) hours as needed for nausea or vomiting (Patient not taking: Reported on 4/8/2024), Disp: 12 tablet, Rfl: 0    No Known Allergies    Objective:  Vitals:    05/02/24 1525   BP: 149/74   Pulse: 72       Body mass index is 29.71 kg/m².    Right Hip Exam     Tenderness   The patient is experiencing no tenderness.     Range of Motion   Abduction:  20 abnormal   Adduction:  20 abnormal   Extension:  0 normal   Flexion:  80 abnormal   External rotation:  20 abnormal   Internal rotation:  0 abnormal     Muscle Strength   Abduction: 5/5   Adduction: 5/5   Flexion: 4/5     Tests   LEILA: positive  Bonny: negative    Other   Erythema: absent  Scars: absent  Sensation: normal  Pulse: present    Comments:  Difficulty mobilizing to table  Small protuberant abdomen  Significantly limited bilateral hip range of motion as above  Ambulates with antalgic gait and no assistive device  Thigh and calf soft and nontender  Negative neuro straight leg raise bilaterally  Grossly distally neurovascularly intact      Left Hip Exam     Tenderness   The patient is experiencing no tenderness.     Range of Motion   Abduction:  20 abnormal   Adduction:  20 abnormal   Extension:  0 normal   Flexion:  abnormal Left hip flexion: 75.  External rotation:  20 abnormal   Internal rotation: 5 abnormal     Muscle Strength   Abduction: 5/5   Adduction: 5/5   Flexion: 5/5     Tests   LEILA: positive  Bonny: negative    Other   Erythema: absent  Scars: absent  Sensation:  normal  Pulse: present            Physical Exam  Vitals and nursing note reviewed.   Constitutional:       Appearance: Normal appearance. She is well-developed.      Comments: Body mass index is 29.71 kg/m².   HENT:      Head: Normocephalic and atraumatic.      Right Ear: External ear normal.      Left Ear: External ear normal.   Eyes:      Extraocular Movements: Extraocular movements intact.      Conjunctiva/sclera: Conjunctivae normal.   Cardiovascular:      Rate and Rhythm: Normal rate.      Pulses: Normal pulses.   Pulmonary:      Effort: Pulmonary effort is normal.   Abdominal:      Palpations: Abdomen is soft.   Musculoskeletal:      Cervical back: Normal range of motion.      Comments: See ortho exam   Skin:     General: Skin is warm and dry.   Neurological:      General: No focal deficit present.      Mental Status: She is alert and oriented to person, place, and time. Mental status is at baseline.   Psychiatric:         Mood and Affect: Mood normal.         Behavior: Behavior normal.         Thought Content: Thought content normal.         Judgment: Judgment normal.       I have personally reviewed pertinent films in PACS of the updated x-rays taken today of her pelvis and bilateral hips.  She has severe end-stage degenerative changes with bone-on-bone appearance.  There is significant sclerosis and osteophytosis.  There is also subchondral cyst remission in the acetabulum.  There is no evidence of avascular necrosis or femoral head collapse.    This document was created using speech voice recognition software.   Grammatical errors, random word insertions, pronoun errors, and incomplete sentences are an occasional consequence of this system due to software limitations, ambient noise, and hardware issues.   Any formal questions or concerns about content, text, or information contained within the body of this dictation should be directly addressed to the provider for clarification.

## 2024-05-02 NOTE — PROGRESS NOTES
Daily Note     Today's date: 2024  Patient name: Anais Crenshaw  : 1955  MRN: [id number]  Referring provider: Clint Mata PA-C  Dx:   Encounter Diagnosis     ICD-10-CM    1. Pain in both knees, unspecified chronicity  M25.561     M25.562       2. Hip arthritis  M16.10                      Subjective: Pt had appointment with Dr. Rawls prior to today's visit. Pt reports she will be getting CSI for her both hips in the near future.       Objective: See treatment diary below      Assessment: Pt tolerated treatment well today. During session, pt verbalized uncertainty about her conversation with Dr. Rawls earlier today. Pt was advised to reach out to Dr. Rawls for clarification, if needed. Pt was also educated on the anatomy and mechanics of the hip joint for a better understanding of hip OA. Pt would benefit from continued PT to address impairments in hip ROM and strength and promote functional independence with bending over.       Plan: Continue per plan of care.      NO AUTH REQUIRED  Precautions: Standard.      Visit 1 2      24     Lat distraction w hip flexion  MR - bilat 5 min total     LAD  MR- bilat 5 min total            Neuro Re-Ed       squats  Mini squat 2x10                                               Ther Ex       SKTC Towel self OP 5x5s      Hip flexor lunge str 5x5s At step 5s x10     KELLY x5      nustep  TWU 7 min      HS stretch   At step 5s x10     Bridges   2x10                   Ther Activity       Patient education   10 min            Gait Training                     Modalities

## 2024-05-02 NOTE — TELEPHONE ENCOUNTER
Caller: patient    Doctor: blaise    Reason for call: patient is requesting CSI, would like to know if that will be goldman in office at todays appointment    Call back#: [phone]

## 2024-05-02 NOTE — TELEPHONE ENCOUNTER
Carmen pt and advised Dr. Rawls does not do intra-articular hip injections, but will do troch bursa if indicated. Patient does not have current Hip xrays, just AP pelvis from 2022, I advised she can still come in for a work up and updated imaging to make sure that injections is the correct care plan, she declined and wanted to cancel appointment, she will be looking somewhere else

## 2024-05-02 NOTE — TELEPHONE ENCOUNTER
Caller: jonathon    Doctor: [facility]    Reason for call: patient would like some clarification on weather she should continue PT or stop it. She discussed it with the Dr but is still unsure .Please   Please advise  Call back#: [phone]

## 2024-05-02 NOTE — PROGRESS NOTES
Assessment/Plan:  1. Bilateral primary osteoarthritis of hip        2. Bilateral hip pain  Ambulatory referral to Orthopedic Surgery    XR hips bilateral 3-4 vw w pelvis if performed        Scribe Attestation      I,:  Urban Garcia PA-C am acting as a scribe while in the presence of the attending physician.:       I,:  Fabrizio Rawls, DO personally performed the services described in this documentation    as scribed in my presence.:           Anais is a pleasant 68-year-old female presenting today for initial evaluation of her activity related bilateral hip pain.  Updated imaging today demonstrates severe end-stage degenerative changes of both hips.  We had a long discussion with her today that she would ultimately require total hip arthroplasties.  She may continue physical therapy, but it is unlikely that she will significantly improve her range of motion as she has a mechanical problem with both hips.  We discussed that she would like to exhaust all conservative treatment before considering surgery.  Therefore, we referred her to spine and pain for bilateral fluoroscopic guided intra-articular hip injections.  She is not diabetic, so she may undergo the injections at the same time.  She understands that they do not provide sufficient relief, she will be candidate for total hip arthroplasty.  Will plan to see her back pending efficacy of the injections.  She expressed understanding all of her questions were addressed today    Subjective: Bilateral hip pain    Patient ID: Anais Crenshaw is a 68 y.o. female presenting today for evaluation of her bilateral hips.  She denies any history of injury or surgery to her hips or problems as a child.  She did see our colleague, Dr. Gallegos, for knee pain in July of last year and it was felt that her pain is likely referred from her hips.  She reports up to 6 out of 10 pain on an intermittent basis.  It is worse getting in and out of a car.  She has difficulty  bending and putting on her shoes and socks in the morning.  She has difficulty going up and down stairs.  She is still working as a CNA at [employer].  She is working with physical therapy now in an effort to improve her flexibility.  She locates pain primarily in the groin radiating down to the medial knee.  She does not have any back pain or paresthesias in either lower extremity.  Her right hip bothers her more than her left    Review of Systems   Constitutional:  Positive for activity change.   HENT: Negative.     Eyes: Negative.    Respiratory: Negative.     Cardiovascular: Negative.    Gastrointestinal: Negative.    Endocrine: Negative.    Genitourinary: Negative.    Musculoskeletal:  Positive for arthralgias, gait problem, joint swelling and myalgias.   Skin: Negative.    Allergic/Immunologic: Negative.    Hematological: Negative.    Psychiatric/Behavioral: Negative.           Past Medical History:   Diagnosis Date    Asymptomatic bacteriuria 4/9/2020    Chest tightness 3/31/2020    COVID-19 virus infection 4/7/2020    H/O antinuclear antibodies     Hallucinations 4/8/2020    Pneumonia due to COVID-19 virus 4/9/2020    SIRS (systemic inflammatory response syndrome) (HCC) 3/31/2020    Urinary tract infection     last assessed: 10/24/2013       Past Surgical History:   Procedure Laterality Date    APPENDECTOMY         Family History   Problem Relation Age of Onset    Diabetes Mother     No Known Problems Father     No Known Problems Sister     No Known Problems Sister     No Known Problems Maternal Grandmother     No Known Problems Maternal Grandfather     No Known Problems Paternal Grandmother     No Known Problems Paternal Grandfather        Social History     Occupational History    Not on file   Tobacco Use    Smoking status: Never     Passive exposure: Never    Smokeless tobacco: Never   Vaping Use    Vaping status: Never Used   Substance and Sexual Activity    Alcohol use: Not Currently    Drug use: Not  Currently    Sexual activity: Not Currently     Partners: Male         Current Outpatient Medications:     celecoxib (CeleBREX) 200 mg capsule, TAKE 1 CAPSULE DAILY (DO NOT TAKE ADVIL WHILE ON THIS MEDICATION, MAY TAKE TYLENOL), Disp: 30 capsule, Rfl: 5    mupirocin (BACTROBAN) 2 % ointment, APPLY TOPICALLY 3 (THREE) TIMES A DAY (Patient not taking: Reported on 4/8/2024), Disp: 22 g, Rfl: 0    ondansetron (ZOFRAN-ODT) 4 mg disintegrating tablet, Take 1 tablet (4 mg total) by mouth every 6 (six) hours as needed for nausea or vomiting (Patient not taking: Reported on 4/8/2024), Disp: 12 tablet, Rfl: 0    No Known Allergies    Objective:  Vitals:    05/02/24 1525   BP: 149/74   Pulse: 72       Body mass index is 29.71 kg/m².    Right Hip Exam     Tenderness   The patient is experiencing no tenderness.     Range of Motion   Abduction:  20 abnormal   Adduction:  20 abnormal   Extension:  0 normal   Flexion:  80 abnormal   External rotation:  20 abnormal   Internal rotation:  0 abnormal     Muscle Strength   Abduction: 5/5   Adduction: 5/5   Flexion: 4/5     Tests   LEILA: positive  Bonny: negative    Other   Erythema: absent  Scars: absent  Sensation: normal  Pulse: present    Comments:  Difficulty mobilizing to table  Small protuberant abdomen  Significantly limited bilateral hip range of motion as above  Ambulates with antalgic gait and no assistive device  Thigh and calf soft and nontender  Negative neuro straight leg raise bilaterally  Grossly distally neurovascularly intact      Left Hip Exam     Tenderness   The patient is experiencing no tenderness.     Range of Motion   Abduction:  20 abnormal   Adduction:  20 abnormal   Extension:  0 normal   Flexion:  abnormal Left hip flexion: 75.  External rotation:  20 abnormal   Internal rotation: 5 abnormal     Muscle Strength   Abduction: 5/5   Adduction: 5/5   Flexion: 5/5     Tests   LEILA: positive  Bonny: negative    Other   Erythema: absent  Scars: absent  Sensation:  normal  Pulse: present            Physical Exam  Vitals and nursing note reviewed.   Constitutional:       Appearance: Normal appearance. She is well-developed.      Comments: Body mass index is 29.71 kg/m².   HENT:      Head: Normocephalic and atraumatic.      Right Ear: External ear normal.      Left Ear: External ear normal.   Eyes:      Extraocular Movements: Extraocular movements intact.      Conjunctiva/sclera: Conjunctivae normal.   Cardiovascular:      Rate and Rhythm: Normal rate.      Pulses: Normal pulses.   Pulmonary:      Effort: Pulmonary effort is normal.   Abdominal:      Palpations: Abdomen is soft.   Musculoskeletal:      Cervical back: Normal range of motion.      Comments: See ortho exam   Skin:     General: Skin is warm and dry.   Neurological:      General: No focal deficit present.      Mental Status: She is alert and oriented to person, place, and time. Mental status is at baseline.   Psychiatric:         Mood and Affect: Mood normal.         Behavior: Behavior normal.         Thought Content: Thought content normal.         Judgment: Judgment normal.       I have personally reviewed pertinent films in PACS of the updated x-rays taken today of her pelvis and bilateral hips.  She has severe end-stage degenerative changes with bone-on-bone appearance.  There is significant sclerosis and osteophytosis.  There is also subchondral cyst remission in the acetabulum.  There is no evidence of avascular necrosis or femoral head collapse.    This document was created using speech voice recognition software.   Grammatical errors, random word insertions, pronoun errors, and incomplete sentences are an occasional consequence of this system due to software limitations, ambient noise, and hardware issues.   Any formal questions or concerns about content, text, or information contained within the body of this dictation should be directly addressed to the provider for clarification.

## 2024-05-06 ENCOUNTER — TELEPHONE (OUTPATIENT)
Dept: OBGYN CLINIC | Facility: CLINIC | Age: 69
End: 2024-05-06

## 2024-05-06 NOTE — TELEPHONE ENCOUNTER
Called and spoke with patient in regards to her concern about PT.  Relayed message from Urban Garcia, patient understood and will continue with home exercises.    Thank you,  Sabrina LUNDY

## 2024-05-06 NOTE — TELEPHONE ENCOUNTER
Called and spoke with patient in regards to above message, patient understood and will continue with home exercises, thank you

## 2024-05-07 ENCOUNTER — OFFICE VISIT (OUTPATIENT)
Dept: FAMILY MEDICINE CLINIC | Facility: CLINIC | Age: 69
End: 2024-05-07
Payer: MEDICARE

## 2024-05-07 VITALS
SYSTOLIC BLOOD PRESSURE: 134 MMHG | HEART RATE: 68 BPM | DIASTOLIC BLOOD PRESSURE: 86 MMHG | HEIGHT: 68 IN | RESPIRATION RATE: 16 BRPM | TEMPERATURE: 98.9 F | WEIGHT: 194.6 LBS | BODY MASS INDEX: 29.49 KG/M2

## 2024-05-07 DIAGNOSIS — Z23 NEED FOR PNEUMOCOCCAL 20-VALENT CONJUGATE VACCINATION: ICD-10-CM

## 2024-05-07 DIAGNOSIS — Z78.0 POSTMENOPAUSAL: ICD-10-CM

## 2024-05-07 DIAGNOSIS — Z79.899 ENCOUNTER FOR LONG-TERM CURRENT USE OF MEDICATION: ICD-10-CM

## 2024-05-07 DIAGNOSIS — R73.01 IMPAIRED FASTING GLUCOSE: ICD-10-CM

## 2024-05-07 DIAGNOSIS — D57.3 SICKLE CELL TRAIT (HCC): ICD-10-CM

## 2024-05-07 DIAGNOSIS — Z11.59 NEED FOR HEPATITIS C SCREENING TEST: ICD-10-CM

## 2024-05-07 DIAGNOSIS — Z12.31 SCREENING MAMMOGRAM, ENCOUNTER FOR: ICD-10-CM

## 2024-05-07 DIAGNOSIS — Z00.00 ENCOUNTER FOR INITIAL PREVENTIVE PHYSICAL EXAMINATION COVERED BY MEDICARE: Primary | ICD-10-CM

## 2024-05-07 DIAGNOSIS — Z13.6 SCREENING FOR CARDIOVASCULAR CONDITION: ICD-10-CM

## 2024-05-07 PROCEDURE — G0402 INITIAL PREVENTIVE EXAM: HCPCS | Performed by: FAMILY MEDICINE

## 2024-05-07 NOTE — PROGRESS NOTES
Assessment and Plan:     Problem List Items Addressed This Visit     Impaired fasting glucose     Hemoglobin A1c was 6.1          Relevant Orders    Hemoglobin A1C    Sickle cell trait (HCC)     Unchanged         Other Visit Diagnoses     Encounter for initial preventive physical examination covered by Medicare    -  Primary    Screening mammogram, encounter for        Relevant Orders    Mammo screening bilateral w 3d & cad    Postmenopausal        Relevant Orders    DXA bone density spine hip and pelvis    Need for pneumococcal 20-valent conjugate vaccination        Need for hepatitis C screening test        Relevant Orders    Hepatitis C Antibody    Encounter for long-term current use of medication        Relevant Orders    CBC    Comprehensive metabolic panel    Lipid Panel with Direct LDL reflex    Screening for cardiovascular condition        Relevant Orders    Lipid Panel with Direct LDL reflex      Return in about 6 months (around 11/7/2024) for Next scheduled follow up.     Preventive health issues were discussed with patient, and age appropriate screening tests were ordered as noted in patient's After Visit Summary.  Personalized health advice and appropriate referrals for health education or preventive services given if needed, as noted in patient's After Visit Summary.     History of Present Illness:     Patient presents for a Medicare Wellness Visit    She had lab work for life insurance     labs on Aprl 29th  Total cholesterol was 186  BNP was 407  Glucose was 65  Hemoglobin A1c is 6.1          Patient Care Team:  Violetta Orr DO as PCP - General     Review of Systems:     Review of Systems   Constitutional: Negative.    Respiratory: Negative.     Cardiovascular: Negative.         Problem List:     Patient Active Problem List   Diagnosis   • Sickle cell trait (HCC)   • Impaired fasting glucose   • Esophageal reflux   • SS-A antibody positive      Past Medical and Surgical History:     Past Medical  History:   Diagnosis Date   • Asymptomatic bacteriuria 4/9/2020   • Chest tightness 3/31/2020   • COVID-19 virus infection 4/7/2020   • H/O antinuclear antibodies    • Hallucinations 4/8/2020   • Pneumonia due to COVID-19 virus 4/9/2020   • SIRS (systemic inflammatory response syndrome) (HCC) 3/31/2020   • Urinary tract infection     last assessed: 10/24/2013     Past Surgical History:   Procedure Laterality Date   • APPENDECTOMY        Family History:     Family History   Problem Relation Age of Onset   • Diabetes Mother    • No Known Problems Father    • No Known Problems Sister    • No Known Problems Sister    • No Known Problems Maternal Grandmother    • No Known Problems Maternal Grandfather    • No Known Problems Paternal Grandmother    • No Known Problems Paternal Grandfather       Social History:     Social History     Socioeconomic History   • Marital status: Single     Spouse name: None   • Number of children: None   • Years of education: None   • Highest education level: None   Occupational History   • None   Tobacco Use   • Smoking status: Never     Passive exposure: Never   • Smokeless tobacco: Never   Vaping Use   • Vaping status: Never Used   Substance and Sexual Activity   • Alcohol use: Not Currently   • Drug use: Not Currently   • Sexual activity: Not Currently     Partners: Male   Other Topics Concern   • None   Social History Narrative   • None     Social Determinants of Health     Financial Resource Strain: Not on file   Food Insecurity: No Food Insecurity (5/7/2024)    Hunger Vital Sign    • Worried About Running Out of Food in the Last Year: Never true    • Ran Out of Food in the Last Year: Never true   Transportation Needs: No Transportation Needs (5/7/2024)    PRAPARE - Transportation    • Lack of Transportation (Medical): No    • Lack of Transportation (Non-Medical): No   Physical Activity: Not on file   Stress: Not on file   Social Connections: Not on file   Intimate Partner Violence: Not  on file   Housing Stability: Low Risk  (5/7/2024)    Housing Stability Vital Sign    • Unable to Pay for Housing in the Last Year: No    • Number of Places Lived in the Last Year: 1    • Unstable Housing in the Last Year: No      Medications and Allergies:     Current Outpatient Medications   Medication Sig Dispense Refill   • celecoxib (CeleBREX) 200 mg capsule TAKE 1 CAPSULE DAILY (DO NOT TAKE ADVIL WHILE ON THIS MEDICATION, MAY TAKE TYLENOL) 30 capsule 5   • mupirocin (BACTROBAN) 2 % ointment APPLY TOPICALLY 3 (THREE) TIMES A DAY (Patient not taking: Reported on 4/8/2024) 22 g 0   • ondansetron (ZOFRAN-ODT) 4 mg disintegrating tablet Take 1 tablet (4 mg total) by mouth every 6 (six) hours as needed for nausea or vomiting (Patient not taking: Reported on 4/8/2024) 12 tablet 0     No current facility-administered medications for this visit.     No Known Allergies   Immunizations:     Immunization History   Administered Date(s) Administered   • COVID-19 PFIZER VACCINE 0.3 ML IM 01/05/2021, 01/26/2021, 01/19/2022   • INFLUENZA 09/10/2019   • Influenza, seasonal, injectable 10/01/2015   • Tdap 04/08/2009, 05/06/2022      Health Maintenance:         Topic Date Due   • Hepatitis C Screening  Never done   • Cervical Cancer Screening  Never done   • Breast Cancer Screening: Mammogram  10/14/2023   • Colorectal Cancer Screening  07/23/2024         Topic Date Due   • Pneumococcal Vaccine: 65+ Years (1 of 1 - PCV) Never done   • COVID-19 Vaccine (4 - 2023-24 season) 09/01/2023      Medicare Screening Tests and Risk Assessments:     Anais is here for her Welcome to Medicare visit.     Health Risk Assessment:   Patient rates overall health as good. Patient feels that their physical health rating is slightly worse. Patient is very satisfied with their life. Eyesight was rated as slightly worse. Hearing was rated as same. Patient feels that their emotional and mental health rating is same. Patients states they are never,  rarely angry. Patient states they are never, rarely unusually tired/fatigued. Pain experienced in the last 7 days has been some. Patient's pain rating has been 5/10. Patient states that she has experienced no weight loss or gain in last 6 months.     Depression Screening:   PHQ-2 Score: 0      Fall Risk Screening:   In the past year, patient has experienced: no history of falling in past year      Urinary Incontinence Screening:   Patient has not leaked urine accidently in the last six months.     Home Safety:  Patient does not have trouble with stairs inside or outside of their home. Patient has working smoke alarms and has working carbon monoxide detector. Home safety hazards include: none.     Nutrition:   Current diet is Regular.     Medications:   Patient is not currently taking any over-the-counter supplements. Patient is able to manage medications.     Activities of Daily Living (ADLs)/Instrumental Activities of Daily Living (IADLs):   Walk and transfer into and out of bed and chair?: Yes  Dress and groom yourself?: Yes    Bathe or shower yourself?: Yes    Feed yourself? Yes  Do your laundry/housekeeping?: Yes  Manage your money, pay your bills and track your expenses?: Yes  Make your own meals?: Yes    Do your own shopping?: Yes    Previous Hospitalizations:   Any hospitalizations or ED visits within the last 12 months?: Yes    How many hospitalizations have you had in the last year?: 1-2    Advance Care Planning:   Living will: No    Durable POA for healthcare: Yes    Advanced directive counseling given: Yes    ACP document given: Yes    End of Life Decisions reviewed with patient: Yes    Provider agrees with end of life decisions: Yes      Cognitive Screening:   Provider or family/friend/caregiver concerned regarding cognition?: No    PREVENTIVE SCREENINGS      Cardiovascular Screening:    General: Screening Current      Diabetes Screening:     General: Screening Current      Colorectal Cancer Screening:  "    General: Screening Current      Breast Cancer Screening:     General: Screening Current      Cervical Cancer Screening:    General: Screening Not Indicated      Osteoporosis Screening:    General: Risks and Benefits Discussed    Due for: DXA Axial      Abdominal Aortic Aneurysm (AAA) Screening:        General: Screening Not Indicated      Lung Cancer Screening:     General: Screening Not Indicated      Hepatitis C Screening:    General: Risks and Benefits Discussed    Hep C Screening Accepted: Yes      Screening, Brief Intervention, and Referral to Treatment (SBIRT)    Screening  Typical number of drinks in a day: 0  Typical number of drinks in a week: 0  Interpretation: Low risk drinking behavior.    AUDIT-C Screenin) How often did you have a drink containing alcohol in the past year? never  2) How many drinks did you have on a typical day when you were drinking in the past year? 0  3) How often did you have 6 or more drinks on one occasion in the past year? never    AUDIT-C Score: 0  Interpretation: Score 0-2 (female): Negative screen for alcohol misuse    Single Item Drug Screening:  How often have you used an illegal drug (including marijuana) or a prescription medication for non-medical reasons in the past year? never    Single Item Drug Screen Score: 0  Interpretation: Negative screen for possible drug use disorder    Brief Intervention  Alcohol & drug use screenings were reviewed. No concerns regarding substance use disorder identified.     Vision Screening    Right eye Left eye Both eyes   Without correction 20/30 20/100 20/30   With correction           Physical Exam:     /86   Pulse 68   Temp 98.9 °F (37.2 °C) (Tympanic)   Resp 16   Ht 5' 8\" (1.727 m)   Wt 88.3 kg (194 lb 9.6 oz)   BMI 29.59 kg/m²     Physical Exam  Vitals and nursing note reviewed.   Constitutional:       Appearance: She is well-developed.   HENT:      Head: Normocephalic and atraumatic.      Right Ear: External ear " normal.      Left Ear: External ear normal.      Nose: Nose normal.   Cardiovascular:      Rate and Rhythm: Normal rate and regular rhythm.      Heart sounds: Normal heart sounds. No murmur heard.     No friction rub.   Pulmonary:      Effort: No respiratory distress.      Breath sounds: Normal breath sounds. No wheezing or rales.   Musculoskeletal:      Right lower leg: No edema.      Left lower leg: No edema.          Violetta Orr,

## 2024-05-07 NOTE — PATIENT INSTRUCTIONS
Medicare Preventive Visit Patient Instructions  Thank you for completing your Welcome to Medicare Visit or Medicare Annual Wellness Visit today. Your next wellness visit will be due in one year (5/8/2025).  The screening/preventive services that you may require over the next 5-10 years are detailed below. Some tests may not apply to you based off risk factors and/or age. Screening tests ordered at today's visit but not completed yet may show as past due. Also, please note that scanned in results may not display below.  Preventive Screenings:  Service Recommendations Previous Testing/Comments   Colorectal Cancer Screening  * Colonoscopy    * Fecal Occult Blood Test (FOBT)/Fecal Immunochemical Test (FIT)  * Fecal DNA/Cologuard Test  * Flexible Sigmoidoscopy Age: 45-75 years old   Colonoscopy: every 10 years (may be performed more frequently if at higher risk)  OR  FOBT/FIT: every 1 year  OR  Cologuard: every 3 years  OR  Sigmoidoscopy: every 5 years  Screening may be recommended earlier than age 45 if at higher risk for colorectal cancer. Also, an individualized decision between you and your healthcare provider will decide whether screening between the ages of 76-85 would be appropriate. Colonoscopy: 07/23/2014  FOBT/FIT: Not on file  Cologuard: Not on file  Sigmoidoscopy: Not on file    Screening Current     Breast Cancer Screening Age: 40+ years old  Frequency: every 1-2 years  Not required if history of left and right mastectomy Mammogram: 10/14/2022    Screening Current   Cervical Cancer Screening Between the ages of 21-29, pap smear recommended once every 3 years.   Between the ages of 30-65, can perform pap smear with HPV co-testing every 5 years.   Recommendations may differ for women with a history of total hysterectomy, cervical cancer, or abnormal pap smears in past. Pap Smear: Not on file    Screening Not Indicated   Hepatitis C Screening Once for adults born between 1945 and 1965  More frequently in  patients at high risk for Hepatitis C Hep C Antibody: Not on file        Diabetes Screening 1-2 times per year if you're at risk for diabetes or have pre-diabetes Fasting glucose: No results in last 5 years (No results in last 5 years)  A1C: 5.6 (6/16/2023)  Screening Current   Cholesterol Screening Once every 5 years if you don't have a lipid disorder. May order more often based on risk factors. Lipid panel: 04/01/2020    Screening Current     Other Preventive Screenings Covered by Medicare:  Abdominal Aortic Aneurysm (AAA) Screening: covered once if your at risk. You're considered to be at risk if you have a family history of AAA.  Lung Cancer Screening: covers low dose CT scan once per year if you meet all of the following conditions: (1) Age 55-77; (2) No signs or symptoms of lung cancer; (3) Current smoker or have quit smoking within the last 15 years; (4) You have a tobacco smoking history of at least 20 pack years (packs per day multiplied by number of years you smoked); (5) You get a written order from a healthcare provider.  Glaucoma Screening: covered annually if you're considered high risk: (1) You have diabetes OR (2) Family history of glaucoma OR (3)  aged 50 and older OR (4)  American aged 65 and older  Osteoporosis Screening: covered every 2 years if you meet one of the following conditions: (1) You're estrogen deficient and at risk for osteoporosis based off medical history and other findings; (2) Have a vertebral abnormality; (3) On glucocorticoid therapy for more than 3 months; (4) Have primary hyperparathyroidism; (5) On osteoporosis medications and need to assess response to drug therapy.   Last bone density test (DXA Scan): Not on file.  HIV Screening: covered annually if you're between the age of 15-65. Also covered annually if you are younger than 15 and older than 65 with risk factors for HIV infection. For pregnant patients, it is covered up to 3 times per  pregnancy.    Immunizations:  Immunization Recommendations   Influenza Vaccine Annual influenza vaccination during flu season is recommended for all persons aged >= 6 months who do not have contraindications   Pneumococcal Vaccine   * Pneumococcal conjugate vaccine = PCV13 (Prevnar 13), PCV15 (Vaxneuvance), PCV20 (Prevnar 20)  * Pneumococcal polysaccharide vaccine = PPSV23 (Pneumovax) Adults 19-63 yo with certain risk factors or if 65+ yo  If never received any pneumonia vaccine: recommend Prevnar 20 (PCV20)  Give PCV20 if previously received 1 dose of PCV13 or PPSV23   Hepatitis B Vaccine 3 dose series if at intermediate or high risk (ex: diabetes, end stage renal disease, liver disease)   Respiratory syncytial virus (RSV) Vaccine - COVERED BY MEDICARE PART D  * RSVPreF3 (Arexvy) CDC recommends that adults 60 years of age and older may receive a single dose of RSV vaccine using shared clinical decision-making (SCDM)   Tetanus (Td) Vaccine - COST NOT COVERED BY MEDICARE PART B Following completion of primary series, a booster dose should be given every 10 years to maintain immunity against tetanus. Td may also be given as tetanus wound prophylaxis.   Tdap Vaccine - COST NOT COVERED BY MEDICARE PART B Recommended at least once for all adults. For pregnant patients, recommended with each pregnancy.   Shingles Vaccine (Shingrix) - COST NOT COVERED BY MEDICARE PART B  2 shot series recommended in those 19 years and older who have or will have weakened immune systems or those 50 years and older     Health Maintenance Due:      Topic Date Due   • Hepatitis C Screening  Never done   • Cervical Cancer Screening  Never done   • Breast Cancer Screening: Mammogram  10/14/2023   • Colorectal Cancer Screening  07/23/2024     Immunizations Due:      Topic Date Due   • Pneumococcal Vaccine: 65+ Years (1 of 1 - PCV) Never done   • COVID-19 Vaccine (4 - 2023-24 season) 09/01/2023     Advance Directives   What are advance directives?   Advance directives are legal documents that state your wishes and plans for medical care. These plans are made ahead of time in case you lose your ability to make decisions for yourself. Advance directives can apply to any medical decision, such as the treatments you want, and if you want to donate organs.   What are the types of advance directives?  There are many types of advance directives, and each state has rules about how to use them. You may choose a combination of any of the following:  Living will:  This is a written record of the treatment you want. You can also choose which treatments you do not want, which to limit, and which to stop at a certain time. This includes surgery, medicine, IV fluid, and tube feedings.   Durable power of  for healthcare (DPAHC):  This is a written record that states who you want to make healthcare choices for you when you are unable to make them for yourself. This person, called a proxy, is usually a family member or a friend. You may choose more than 1 proxy.  Do not resuscitate (DNR) order:  A DNR order is used in case your heart stops beating or you stop breathing. It is a request not to have certain forms of treatment, such as CPR. A DNR order may be included in other types of advance directives.  Medical directive:  This covers the care that you want if you are in a coma, near death, or unable to make decisions for yourself. You can list the treatments you want for each condition. Treatment may include pain medicine, surgery, blood transfusions, dialysis, IV or tube feedings, and a ventilator (breathing machine).  Values history:  This document has questions about your views, beliefs, and how you feel and think about life. This information can help others choose the care that you would choose.  Why are advance directives important?  An advance directive helps you control your care. Although spoken wishes may be used, it is better to have your wishes written down.  Spoken wishes can be misunderstood, or not followed. Treatments may be given even if you do not want them. An advance directive may make it easier for your family to make difficult choices about your care.   Urinary Incontinence   Urinary incontinence (UI)  is when you lose control of your bladder. UI develops because your bladder cannot store or empty urine properly. The 3 most common types of UI are stress incontinence, urge incontinence, or both.  Medicines:   May be given to help strengthen your bladder control. Report any side effects of medication to your healthcare provider.  Do pelvic muscle exercises often:  Your pelvic muscles help you stop urinating. Squeeze these muscles tight for 5 seconds, then relax for 5 seconds. Gradually work up to squeezing for 10 seconds. Do 3 sets of 15 repetitions a day, or as directed. This will help strengthen your pelvic muscles and improve bladder control.  Train your bladder:  Go to the bathroom at set times, such as every 2 hours, even if you do not feel the urge to go. You can also try to hold your urine when you feel the urge to go. For example, hold your urine for 5 minutes when you feel the urge to go. As that becomes easier, hold your urine for 10 minutes.   Self-care:   Keep a UI record.  Write down how often you leak urine and how much you leak. Make a note of what you were doing when you leaked urine.  Drink liquids as directed. You may need to limit the amount of liquid you drink to help control your urine leakage. Do not drink any liquid right before you go to bed. Limit or do not have drinks that contain caffeine or alcohol.   Prevent constipation.  Eat a variety of high-fiber foods. Good examples are high-fiber cereals, beans, vegetables, and whole-grain breads. Walking is the best way to trigger your intestines to have a bowel movement.  Exercise regularly and maintain a healthy weight.  Weight loss and exercise will decrease pressure on your bladder and help  you control your leakage.   Use a catheter as directed  to help empty your bladder. A catheter is a tiny, plastic tube that is put into your bladder to drain your urine.   Go to behavior therapy as directed.  Behavior therapy may be used to help you learn to control your urge to urinate.    Weight Management   Why it is important to manage your weight:  Being overweight increases your risk of health conditions such as heart disease, high blood pressure, type 2 diabetes, and certain types of cancer. It can also increase your risk for osteoarthritis, sleep apnea, and other respiratory problems. Aim for a slow, steady weight loss. Even a small amount of weight loss can lower your risk of health problems.  How to lose weight safely:  A safe and healthy way to lose weight is to eat fewer calories and get regular exercise. You can lose up about 1 pound a week by decreasing the number of calories you eat by 500 calories each day.   Healthy meal plan for weight management:  A healthy meal plan includes a variety of foods, contains fewer calories, and helps you stay healthy. A healthy meal plan includes the following:  Eat whole-grain foods more often.  A healthy meal plan should contain fiber. Fiber is the part of grains, fruits, and vegetables that is not broken down by your body. Whole-grain foods are healthy and provide extra fiber in your diet. Some examples of whole-grain foods are whole-wheat breads and pastas, oatmeal, brown rice, and bulgur.  Eat a variety of vegetables every day.  Include dark, leafy greens such as spinach, kale, noreen greens, and mustard greens. Eat yellow and orange vegetables such as carrots, sweet potatoes, and winter squash.   Eat a variety of fruits every day.  Choose fresh or canned fruit (canned in its own juice or light syrup) instead of juice. Fruit juice has very little or no fiber.  Eat low-fat dairy foods.  Drink fat-free (skim) milk or 1% milk. Eat fat-free yogurt and low-fat  cottage cheese. Try low-fat cheeses such as mozzarella and other reduced-fat cheeses.  Choose meat and other protein foods that are low in fat.  Choose beans or other legumes such as split peas or lentils. Choose fish, skinless poultry (chicken or turkey), or lean cuts of red meat (beef or pork). Before you cook meat or poultry, cut off any visible fat.   Use less fat and oil.  Try baking foods instead of frying them. Add less fat, such as margarine, sour cream, regular salad dressing and mayonnaise to foods. Eat fewer high-fat foods. Some examples of high-fat foods include french fries, doughnuts, ice cream, and cakes.  Eat fewer sweets.  Limit foods and drinks that are high in sugar. This includes candy, cookies, regular soda, and sweetened drinks.  Exercise:  Exercise at least 30 minutes per day on most days of the week. Some examples of exercise include walking, biking, dancing, and swimming. You can also fit in more physical activity by taking the stairs instead of the elevator or parking farther away from stores. Ask your healthcare provider about the best exercise plan for you.      © Copyright [employer] 2018 Information is for End User's use only and may not be sold, redistributed or otherwise used for commercial purposes. All illustrations and images included in CareNotes® are the copyrighted property of [employer] or [employer]

## 2024-05-15 ENCOUNTER — TELEPHONE (OUTPATIENT)
Dept: OTHER | Facility: OTHER | Age: 69
End: 2024-05-15

## 2024-05-15 NOTE — TELEPHONE ENCOUNTER
Patient called wanting to now if the office had received the APS report from her life insurance. Please advise.

## 2024-05-16 NOTE — TELEPHONE ENCOUNTER
Patient is calling her insurance man for more information.   She is asking if this is going to be faxed or mailed.   And also asking what an aps form is.  Patient will call back.

## 2024-05-20 ENCOUNTER — TELEPHONE (OUTPATIENT)
Age: 69
End: 2024-05-20

## 2024-05-20 NOTE — TELEPHONE ENCOUNTER
Patient called asking to speak to [facility] S[facility].  Provided number and warm transfer to [facility] hospitals.

## 2024-05-22 ENCOUNTER — TELEPHONE (OUTPATIENT)
Age: 69
End: 2024-05-22

## 2024-05-24 ENCOUNTER — HOSPITAL ENCOUNTER (OUTPATIENT)
Facility: AMBULARY SURGERY CENTER | Age: 69
Setting detail: OUTPATIENT SURGERY
Discharge: HOME | End: 2024-05-24
Attending: STUDENT IN AN ORGANIZED HEALTH CARE EDUCATION/TRAINING PROGRAM | Admitting: STUDENT IN AN ORGANIZED HEALTH CARE EDUCATION/TRAINING PROGRAM
Payer: COMMERCIAL

## 2024-05-24 ENCOUNTER — APPOINTMENT (OUTPATIENT)
Dept: RADIOLOGY | Facility: HOSPITAL | Age: 69
End: 2024-05-24
Payer: COMMERCIAL

## 2024-05-24 VITALS
TEMPERATURE: 96 F | RESPIRATION RATE: 19 BRPM | DIASTOLIC BLOOD PRESSURE: 74 MMHG | OXYGEN SATURATION: 100 % | SYSTOLIC BLOOD PRESSURE: 159 MMHG | HEART RATE: 64 BPM

## 2024-05-24 PROBLEM — M25.552 BILATERAL HIP PAIN: Status: ACTIVE | Noted: 2024-05-24

## 2024-05-24 PROBLEM — M25.551 BILATERAL HIP PAIN: Status: ACTIVE | Noted: 2024-05-24

## 2024-05-24 PROCEDURE — NC001 PR NO CHARGE: Performed by: STUDENT IN AN ORGANIZED HEALTH CARE EDUCATION/TRAINING PROGRAM

## 2024-05-24 PROCEDURE — 77002 NEEDLE LOCALIZATION BY XRAY: CPT | Performed by: STUDENT IN AN ORGANIZED HEALTH CARE EDUCATION/TRAINING PROGRAM

## 2024-05-24 PROCEDURE — 77002 NEEDLE LOCALIZATION BY XRAY: CPT

## 2024-05-24 PROCEDURE — 20610 DRAIN/INJ JOINT/BURSA W/O US: CPT | Performed by: STUDENT IN AN ORGANIZED HEALTH CARE EDUCATION/TRAINING PROGRAM

## 2024-05-24 RX ORDER — METHYLPREDNISOLONE ACETATE 80 MG/ML
INJECTION, SUSPENSION INTRA-ARTICULAR; INTRALESIONAL; INTRAMUSCULAR; SOFT TISSUE AS NEEDED
Status: DISCONTINUED | OUTPATIENT
Start: 2024-05-24 | End: 2024-05-24 | Stop reason: HOSPADM

## 2024-05-24 RX ORDER — ROPIVACAINE HYDROCHLORIDE 2 MG/ML
INJECTION, SOLUTION EPIDURAL; INFILTRATION; PERINEURAL AS NEEDED
Status: DISCONTINUED | OUTPATIENT
Start: 2024-05-24 | End: 2024-05-24 | Stop reason: HOSPADM

## 2024-05-24 RX ORDER — LIDOCAINE HYDROCHLORIDE 10 MG/ML
INJECTION, SOLUTION EPIDURAL; INFILTRATION; INTRACAUDAL; PERINEURAL AS NEEDED
Status: DISCONTINUED | OUTPATIENT
Start: 2024-05-24 | End: 2024-05-24 | Stop reason: HOSPADM

## 2024-05-24 NOTE — DISCHARGE INSTRUCTIONS

## 2024-05-24 NOTE — OP NOTE
Pre-procedure Diagnosis: Hip osteoarthritis  Post-procedure Diagnosis: Hip osteoarthritis   Operation Title(s):  1. bilateral hip intra-articular steroid injection      2. Intraoperative fluoroscopy  Attending Surgeon:   Otoniel Gallegos MD  Anesthesia:   Local    Indications: The patient is 68 y.o. year-old female with a diagnosis of hip osteoarthritis. The patient's history and physical exam were reviewed.   The risks, benefits and alternatives to the procedure were discussed, and all questions were answered to the patient's satisfaction. The patient agreed to proceed, and written informed consent was obtained.    Procedure in Detail: The patient was brought into the procedure room and placed in the supine position on the fluoroscopy table. The bilateralhip(s) were prepped with chloraprep time two and draped in a sterile manner.     AP fluoroscopy was used to identify and solange the bilateral femoral neck and head. The C-arm was obliqued 20° to displace the femoral nerve in vessels .The skin and subcutaneous tissues in the area was anesthetized with 1% lidocaine. A 22-gauge, 3½-inch spinal needle was advanced toward the identified point under fluoroscopic guidance until bone was contacted and the joint space was entered.  Then, after negative aspiration, 1-ml Omnipaque 240 contrast solution was injected showing an appropriate arthrogram. Then, a solution consisting of 2-mL 0.2% ropivacaine and 0.5 ml Depo-Medrol (80mg/ml) was easily injected. The needle was removed with a 1% lidocaine flush.     The patient's hip(s) was cleaned and a bandage was placed over the sites of needle insertion.    Disposition: The patient tolerated the procedure well and there were no apparent complications.  The patient was taken to the recovery area where written discharge instructions for the procedure were given.    Estimated Blood Loss: None  Specimens Obtained: N/A

## 2024-05-24 NOTE — INTERVAL H&P NOTE
H&P reviewed. After examining the patient I find no changes in the patients condition since the H&P had been written.    Vitals:    05/24/24 0749   BP: 146/69   Pulse: 74   Resp: 18   Temp: (!) 96 °F (35.6 °C)   SpO2: 96%

## 2024-05-31 ENCOUNTER — TELEPHONE (OUTPATIENT)
Dept: PAIN MEDICINE | Facility: CLINIC | Age: 69
End: 2024-05-31

## 2024-06-01 LAB
ALBUMIN SERPL-MCNC: 4.3 G/DL (ref 3.9–4.9)
ALBUMIN/GLOB SERPL: 1.3 {RATIO} (ref 1.2–2.2)
ALP SERPL-CCNC: 127 IU/L (ref 44–121)
ALT SERPL-CCNC: 12 IU/L (ref 0–32)
AST SERPL-CCNC: 16 IU/L (ref 0–40)
BASOPHILS # BLD AUTO: 0.1 X10E3/UL (ref 0–0.2)
BASOPHILS NFR BLD AUTO: 1 %
BILIRUB SERPL-MCNC: 0.5 MG/DL (ref 0–1.2)
BUN SERPL-MCNC: 14 MG/DL (ref 8–27)
BUN/CREAT SERPL: 17 (ref 12–28)
CALCIUM SERPL-MCNC: 10 MG/DL (ref 8.7–10.3)
CHLORIDE SERPL-SCNC: 104 MMOL/L (ref 96–106)
CHOLEST SERPL-MCNC: 177 MG/DL (ref 100–199)
CO2 SERPL-SCNC: 24 MMOL/L (ref 20–29)
CREAT SERPL-MCNC: 0.81 MG/DL (ref 0.57–1)
EGFR: 80 ML/MIN/1.73
EOSINOPHIL # BLD AUTO: 0.2 X10E3/UL (ref 0–0.4)
EOSINOPHIL NFR BLD AUTO: 2 %
ERYTHROCYTE [DISTWIDTH] IN BLOOD BY AUTOMATED COUNT: 14.3 % (ref 11.7–15.4)
GLOBULIN SER-MCNC: 3.2 G/DL (ref 1.5–4.5)
GLUCOSE SERPL-MCNC: 92 MG/DL (ref 70–99)
HBA1C MFR BLD: 6 % (ref 4.8–5.6)
HCT VFR BLD AUTO: 36.3 % (ref 34–46.6)
HCV AB S/CO SERPL IA: NON REACTIVE
HDLC SERPL-MCNC: 60 MG/DL
HGB BLD-MCNC: 11.9 G/DL (ref 11.1–15.9)
IMM GRANULOCYTES # BLD: 0 X10E3/UL (ref 0–0.1)
IMM GRANULOCYTES NFR BLD: 0 %
LDLC SERPL CALC-MCNC: 105 MG/DL (ref 0–99)
LDLC/HDLC SERPL: 1.8 RATIO (ref 0–3.2)
LYMPHOCYTES # BLD AUTO: 2.4 X10E3/UL (ref 0.7–3.1)
LYMPHOCYTES NFR BLD AUTO: 31 %
MCH RBC QN AUTO: 26.4 PG (ref 26.6–33)
MCHC RBC AUTO-ENTMCNC: 32.8 G/DL (ref 31.5–35.7)
MCV RBC AUTO: 81 FL (ref 79–97)
MICRODELETION SYND BLD/T FISH: NORMAL
MONOCYTES # BLD AUTO: 0.5 X10E3/UL (ref 0.1–0.9)
MONOCYTES NFR BLD AUTO: 7 %
NEUTROPHILS # BLD AUTO: 4.6 X10E3/UL (ref 1.4–7)
NEUTROPHILS NFR BLD AUTO: 59 %
PLATELET # BLD AUTO: 219 X10E3/UL (ref 150–450)
POTASSIUM SERPL-SCNC: 4.1 MMOL/L (ref 3.5–5.2)
PROT SERPL-MCNC: 7.5 G/DL (ref 6–8.5)
RBC # BLD AUTO: 4.51 X10E6/UL (ref 3.77–5.28)
SL AMB VLDL CHOLESTEROL CALC: 12 MG/DL (ref 5–40)
SODIUM SERPL-SCNC: 142 MMOL/L (ref 134–144)
TRIGL SERPL-MCNC: 62 MG/DL (ref 0–149)
WBC # BLD AUTO: 7.6 X10E3/UL (ref 3.4–10.8)

## 2024-07-17 NOTE — ASSESSMENT & PLAN NOTE
OK for 1115 APPT Friday AM   Patient presented with visual hallucinations after suffering from COVID-19 symptoms since 04/01/2020 when she presented to her PCP and was tested  She presented to the ED  since her test results came back positive  She is currently on contact/airborne/droplet isolation  Continue therapy with hydroxychloroquine 200 mg PO Q 12h x4 days to end on Saturday 4/11  Continue azithromycin 250 mg PO daily x 4 days to end on Saturday 4/11  Baseline EKG on 04/07 showed QTc 438ms  Last EKG on 03/31/2020 showed QTc 442ms  Continue telemetry to monitor QTC interval daily and also monitor glycemia while on hydroxychloroquine  Inflammatory markers:  Procalcitonin trend 0 99-->0 70, ferritin trend 1355--> 1322, C-reactive protein trend 217 7-->239 9-->180  3  Continue to trend inflammatory markers daily  Patient seems to be improving  Appreciate ID recommendations-due to no current evidence of superimposed bacterial pneumonia, all antibiotics including Rocephin have been discontinued

## 2024-08-01 ENCOUNTER — TELEPHONE (OUTPATIENT)
Dept: GASTROENTEROLOGY | Facility: AMBULARY SURGERY CENTER | Age: 69
End: 2024-08-01

## 2024-08-01 NOTE — TELEPHONE ENCOUNTER
Patient called the RX Refill Line.     Patient called back to let us know she found her instructions.

## 2024-08-05 ENCOUNTER — ANESTHESIA EVENT (OUTPATIENT)
Dept: GASTROENTEROLOGY | Facility: HOSPITAL | Age: 69
End: 2024-08-05

## 2024-08-05 ENCOUNTER — ANESTHESIA (OUTPATIENT)
Dept: GASTROENTEROLOGY | Facility: HOSPITAL | Age: 69
End: 2024-08-05

## 2024-08-05 ENCOUNTER — HOSPITAL ENCOUNTER (OUTPATIENT)
Dept: GASTROENTEROLOGY | Facility: HOSPITAL | Age: 69
Setting detail: OUTPATIENT SURGERY
Discharge: HOME | End: 2024-08-05
Attending: INTERNAL MEDICINE
Payer: COMMERCIAL

## 2024-08-05 VITALS
WEIGHT: 194 LBS | BODY MASS INDEX: 29.4 KG/M2 | SYSTOLIC BLOOD PRESSURE: 120 MMHG | TEMPERATURE: 97.1 F | HEART RATE: 61 BPM | HEIGHT: 68 IN | RESPIRATION RATE: 18 BRPM | OXYGEN SATURATION: 98 % | DIASTOLIC BLOOD PRESSURE: 56 MMHG

## 2024-08-05 DIAGNOSIS — Z12.11 SCREENING FOR COLON CANCER: ICD-10-CM

## 2024-08-05 PROCEDURE — G0121 COLON CA SCRN NOT HI RSK IND: HCPCS | Performed by: INTERNAL MEDICINE

## 2024-08-05 RX ORDER — PROPOFOL 10 MG/ML
INJECTION, EMULSION INTRAVENOUS AS NEEDED
Status: DISCONTINUED | OUTPATIENT
Start: 2024-08-05 | End: 2024-08-05

## 2024-08-05 RX ORDER — LIDOCAINE HYDROCHLORIDE 10 MG/ML
INJECTION, SOLUTION EPIDURAL; INFILTRATION; INTRACAUDAL; PERINEURAL AS NEEDED
Status: DISCONTINUED | OUTPATIENT
Start: 2024-08-05 | End: 2024-08-05

## 2024-08-05 RX ORDER — SODIUM CHLORIDE, SODIUM LACTATE, POTASSIUM CHLORIDE, CALCIUM CHLORIDE 600; 310; 30; 20 MG/100ML; MG/100ML; MG/100ML; MG/100ML
INJECTION, SOLUTION INTRAVENOUS CONTINUOUS PRN
Status: DISCONTINUED | OUTPATIENT
Start: 2024-08-05 | End: 2024-08-05

## 2024-08-05 RX ADMIN — LIDOCAINE HYDROCHLORIDE 50 MG: 10 INJECTION, SOLUTION EPIDURAL; INFILTRATION; INTRACAUDAL at 12:02

## 2024-08-05 RX ADMIN — PROPOFOL 50 MG: 10 INJECTION, EMULSION INTRAVENOUS at 12:06

## 2024-08-05 RX ADMIN — PROPOFOL 120 MCG/KG/MIN: 10 INJECTION, EMULSION INTRAVENOUS at 12:04

## 2024-08-05 RX ADMIN — SODIUM CHLORIDE, SODIUM LACTATE, POTASSIUM CHLORIDE, AND CALCIUM CHLORIDE: .6; .31; .03; .02 INJECTION, SOLUTION INTRAVENOUS at 11:57

## 2024-08-05 RX ADMIN — PROPOFOL 100 MG: 10 INJECTION, EMULSION INTRAVENOUS at 12:02

## 2024-08-05 NOTE — ANESTHESIA PREPROCEDURE EVALUATION
Medical History    History Comments   H/O antinuclear antibodies    Urinary tract infection last assessed: 10/24/2013   SIRS (systemic inflammatory response syndrome) (HCC)    Pneumonia due to COVID-19 virus    Hallucinations    COVID-19 virus infection    Chest tightness    Asymptomatic bacteriuria    Arthritis    Procedure:  COLONOSCOPY    Relevant Problems   ANESTHESIA (within normal limits)      GI/HEPATIC   (+) Esophageal reflux        Physical Exam    Airway    Mallampati score: II  TM Distance: >3 FB  Neck ROM: full     Dental       Cardiovascular  Rate: normal    Pulmonary  Pulmonary exam normal     Other Findings  Per pt denies anything remaining that is loose or removeablepost-pubertal.      Anesthesia Plan  ASA Score- 2     Anesthesia Type- IV sedation with anesthesia with ASA Monitors.         Additional Monitors:     Airway Plan:     Comment: Per patient, appropriately NPO, denies active CP/SOB/wheezing/symptoms related to heartburn/nausea/vomiting  .       Plan Factors-Exercise tolerance (METS): >4 METS.    Chart reviewed.    Patient summary reviewed.    Patient is not a current smoker.              Induction- intravenous.    Postoperative Plan-         Informed Consent- Anesthetic plan and risks discussed with patient.  I personally reviewed this patient with the CRNA. Discussed and agreed on the Anesthesia Plan with the CRNA..

## 2024-08-05 NOTE — H&P
"History and Physical -  [facility]  Anais Crenshaw 69 y.o. female MRN: [id number]    HPI: Anais Crenshaw is a 69 y.o. year old female who presents for colon cancer screening evaluation.      Review of Systems    Historical Information   Past Medical History:   Diagnosis Date    Arthritis     Asymptomatic bacteriuria 04/09/2020    Chest tightness 03/31/2020    COVID-19 virus infection 04/07/2020    H/O antinuclear antibodies     Hallucinations 04/08/2020    Pneumonia due to COVID-19 virus 04/09/2020    SIRS (systemic inflammatory response syndrome) (HCC) 03/31/2020    Urinary tract infection     last assessed: 10/24/2013     Past Surgical History:   Procedure Laterality Date    APPENDECTOMY      COLONOSCOPY      [place] ARTHROCENTESIS ASPIR&/INJ MAJOR JT/BURSA W/O US Bilateral 05/24/2024    Procedure: BILATERAL INTRA-ARTICULAR HIP INJECTION;  Surgeon: Otoniel Gallegos MD;  Location: [facility] MAIN OR;  Service: Pain Management      Social History   Social History     Substance and Sexual Activity   Alcohol Use Not Currently     Social History     Substance and Sexual Activity   Drug Use Not Currently     Social History     Tobacco Use   Smoking Status Never    Passive exposure: Never   Smokeless Tobacco Never     Family History   Problem Relation Age of Onset    Diabetes Mother     No Known Problems Father     No Known Problems Sister     No Known Problems Sister     No Known Problems Maternal Grandmother     No Known Problems Maternal Grandfather     No Known Problems Paternal Grandmother     No Known Problems Paternal Grandfather        Meds/Allergies     Not in a hospital admission.    No Known Allergies    Objective     /69   Pulse 68   Temp (!) 96.9 °F (36.1 °C) (Temporal)   Resp 16   Ht 5' 8\" (1.727 m)   Wt 88 kg (194 lb)   SpO2 99%   BMI 29.50 kg/m²       PHYSICAL EXAM    Gen: NAD  CV: RRR  CHEST: Clear  ABD: soft, NT/ND  EXT: no edema  Neuro: AAO      ASSESSMENT/PLAN:  This is a 69 " y.o. year old female here for colon cancer screening evaluation.  Patient was explained about the risks and benefits of the procedure. Risks including but not limited to bleeding, infection, perforation were explained in detail.       PLAN:   Procedure: Colonoscopy.

## 2024-08-05 NOTE — ANESTHESIA POSTPROCEDURE EVALUATION
Post-Op Assessment Note    CV Status:  Stable  Pain Score: 0    Pain management: adequate       Mental Status:  Alert and awake   Hydration Status:  Euvolemic   PONV Controlled:  Controlled   Airway Patency:  Patent     Post Op Vitals Reviewed: Yes    No anethesia notable event occurred.    Staff: CRNA, Anesthesiologist               BP   132/61   Temp (!) 96.9 °F (36.1 °C) (08/05/24 1231)    Pulse 79 (08/05/24 1231)   Resp 12 (08/05/24 1231)    SpO2 100 % (08/05/24 1231)

## 2024-08-07 ENCOUNTER — OFFICE VISIT (OUTPATIENT)
Dept: OBGYN CLINIC | Facility: CLINIC | Age: 69
End: 2024-08-07
Payer: COMMERCIAL

## 2024-08-07 VITALS — WEIGHT: 178 LBS | HEIGHT: 68 IN | BODY MASS INDEX: 26.98 KG/M2

## 2024-08-07 DIAGNOSIS — M16.0 BILATERAL PRIMARY OSTEOARTHRITIS OF HIP: Primary | ICD-10-CM

## 2024-08-07 DIAGNOSIS — M25.551 BILATERAL HIP PAIN: ICD-10-CM

## 2024-08-07 DIAGNOSIS — M25.552 BILATERAL HIP PAIN: ICD-10-CM

## 2024-08-07 PROCEDURE — 99214 OFFICE O/P EST MOD 30 MIN: CPT | Performed by: ORTHOPAEDIC SURGERY

## 2024-08-07 NOTE — PROGRESS NOTES
Assessment/Plan:  1. Bilateral primary osteoarthritis of hip        2. Bilateral hip pain          Scribe Attestation      I,:  Shira Zambrano MD am acting as a scribe while in the presence of the attending physician.:       I,:  Fabrizio Rawls, DO personally performed the services described in this documentation    as scribed in my presence.:           Anais is a pleasant 69 year old female who presents today for repeat evaluation of bilateral hip pain in the setting of known severe bilateral hip primary osteoarthritis. She underwent bilateral hip CSI with spine and pain and reports significant improvement. She will call the office when her hip pain becomes severe and we will schedule a repeat bilateral hip intra articular corticosteroid injection at that time to maximize longevity of effectiveness of the injections. She understands that should the CSI cease to provide benefit, the next step would be surgical intervention.    Subjective: bilateral hip pain    Patient ID: Anais Crenshaw is a 69 y.o. female who presents today for repeat evaluation of bilateral hip pain in the setting of known severe bilateral hip primary osteoarthritis. At our last meeting, we discussed her desire to maximize conservative treatment measures prior to considering surgical intervention. She underwent bilateral hip intra articular corticosteroid injections with Dr Gallegos of spine and pain 5/24/24. She notes almost complete resolution of her symptoms with increased mobility as she is less limited by pain. She is beginning to feel some increase in bilateral groin pain worse with weight bearing and made better with rest, however this is much more mild than prior to the injections.    Review of Systems   Constitutional:  Negative for activity change, chills and fever.   HENT:  Negative for ear pain and sore throat.    Eyes:  Negative for pain and visual disturbance.   Respiratory:  Negative for cough and shortness of breath.     Cardiovascular:  Negative for chest pain and palpitations.   Gastrointestinal:  Negative for abdominal pain and vomiting.   Genitourinary:  Negative for dysuria and hematuria.   Musculoskeletal:  Positive for arthralgias. Negative for back pain.   Skin:  Negative for color change and rash.   Neurological:  Negative for seizures and syncope.   All other systems reviewed and are negative.        Past Medical History:   Diagnosis Date    Arthritis     Asymptomatic bacteriuria 04/09/2020    Chest tightness 03/31/2020    COVID-19 virus infection 04/07/2020    H/O antinuclear antibodies     Hallucinations 04/08/2020    Pneumonia due to COVID-19 virus 04/09/2020    SIRS (systemic inflammatory response syndrome) (HCC) 03/31/2020    Urinary tract infection     last assessed: 10/24/2013       Past Surgical History:   Procedure Laterality Date    APPENDECTOMY      COLONOSCOPY      [place] ARTHROCENTESIS ASPIR&/INJ MAJOR JT/BURSA W/O US Bilateral 05/24/2024    Procedure: BILATERAL INTRA-ARTICULAR HIP INJECTION;  Surgeon: Otoniel Gallegos MD;  Location: [facility] MAIN OR;  Service: Pain Management        Family History   Problem Relation Age of Onset    Diabetes Mother     No Known Problems Father     No Known Problems Sister     No Known Problems Sister     No Known Problems Maternal Grandmother     No Known Problems Maternal Grandfather     No Known Problems Paternal Grandmother     No Known Problems Paternal Grandfather        Social History     Occupational History    Not on file   Tobacco Use    Smoking status: Never     Passive exposure: Never    Smokeless tobacco: Never   Vaping Use    Vaping status: Never Used   Substance and Sexual Activity    Alcohol use: Not Currently    Drug use: Not Currently    Sexual activity: Not Currently     Partners: Male         Current Outpatient Medications:     celecoxib (CeleBREX) 200 mg capsule, TAKE 1 CAPSULE DAILY (DO NOT TAKE ADVIL WHILE ON THIS MEDICATION, MAY TAKE TYLENOL), Disp: 30  capsule, Rfl: 5    No Known Allergies    Objective:  There were no vitals filed for this visit.    Body mass index is 27.06 kg/m².    Right Hip Exam     Tenderness   The patient is experiencing no tenderness.     Range of Motion   Abduction:  20   Adduction:  20   Flexion:  110   External rotation:  30   Internal rotation:  20     Other   Erythema: absent  Scars: absent  Sensation: normal  Pulse: present    Comments:  [facility]      Left Hip Exam     Tenderness   The patient is experiencing no tenderness.     Range of Motion   Abduction:  20   Adduction:  20   Flexion:  110   External rotation:  20   Internal rotation: 10     Other   Erythema: absent  Scars: absent  Sensation: normal  Pulse: present    Comments:  +[facility]            Physical Exam  Vitals and nursing note reviewed.   Constitutional:       General: She is not in acute distress.     Appearance: Normal appearance.   HENT:      Head: Atraumatic.   Eyes:      Extraocular Movements: Extraocular movements intact.      Conjunctiva/sclera: Conjunctivae normal.      Pupils: Pupils are equal, round, and reactive to light.   Cardiovascular:      Rate and Rhythm: Normal rate.      Pulses: Normal pulses.   Pulmonary:      Effort: Pulmonary effort is normal.   Musculoskeletal:      Comments: See ortho exam   Skin:     General: Skin is warm and dry.      Capillary Refill: Capillary refill takes less than 2 seconds.   Neurological:      Mental Status: She is alert and oriented to person, place, and time.   Psychiatric:         Mood and Affect: Mood normal.         This document was created using speech voice recognition software.   Grammatical errors, random word insertions, pronoun errors, and incomplete sentences are an occasional consequence of this system due to software limitations, ambient noise, and hardware issues.   Any formal questions or concerns about content, text, or information contained within the body of this dictation should be directly  addressed to the provider for clarification.

## 2024-09-09 ENCOUNTER — RA CDI HCC (OUTPATIENT)
Dept: OTHER | Facility: HOSPITAL | Age: 69
End: 2024-09-09

## 2024-09-09 ENCOUNTER — TELEPHONE (OUTPATIENT)
Age: 69
End: 2024-09-09

## 2024-09-13 ENCOUNTER — OFFICE VISIT (OUTPATIENT)
Dept: FAMILY MEDICINE CLINIC | Facility: CLINIC | Age: 69
End: 2024-09-13
Payer: COMMERCIAL

## 2024-09-13 VITALS
HEART RATE: 78 BPM | HEIGHT: 68 IN | DIASTOLIC BLOOD PRESSURE: 62 MMHG | WEIGHT: 171.8 LBS | BODY MASS INDEX: 26.04 KG/M2 | SYSTOLIC BLOOD PRESSURE: 114 MMHG | TEMPERATURE: 97.3 F | RESPIRATION RATE: 18 BRPM

## 2024-09-13 DIAGNOSIS — Z13.0 SCREENING FOR DEFICIENCY ANEMIA: ICD-10-CM

## 2024-09-13 DIAGNOSIS — R73.01 IMPAIRED FASTING GLUCOSE: Primary | ICD-10-CM

## 2024-09-13 DIAGNOSIS — M25.551 BILATERAL HIP PAIN: ICD-10-CM

## 2024-09-13 DIAGNOSIS — E78.00 ELEVATED LDL CHOLESTEROL LEVEL: ICD-10-CM

## 2024-09-13 DIAGNOSIS — M25.552 BILATERAL HIP PAIN: ICD-10-CM

## 2024-09-13 LAB — SL AMB POCT HEMOGLOBIN AIC: 5.2 (ref ?–6.5)

## 2024-09-13 PROCEDURE — 83036 HEMOGLOBIN GLYCOSYLATED A1C: CPT | Performed by: FAMILY MEDICINE

## 2024-09-13 PROCEDURE — 99213 OFFICE O/P EST LOW 20 MIN: CPT | Performed by: FAMILY MEDICINE

## 2024-09-13 NOTE — PROGRESS NOTES
"Ambulatory Visit  Name: Anais Crenshaw      : 1955      MRN: [id number]  Encounter Provider: Violetta Orr DO  Encounter Date: 2024   Encounter department: [facility]    Assessment & Plan  Impaired fasting glucose  Improved significantly with weight loss  Orders:    POCT hemoglobin A1c    Comprehensive metabolic panel; Future    Hemoglobin A1C; Future    Comprehensive metabolic panel    Hemoglobin A1C    Bilateral hip pain  Doing well with pain management  Will continue injections per their recommendations       Screening for deficiency anemia    Orders:    CBC; Future    CBC    Elevated LDL cholesterol level    Orders:    Lipid Panel with Direct LDL reflex; Future    Lipid Panel with Direct LDL reflex       Will get her flu shot at work    Return in about 8 months (around 2025) for Annual Wellness Visit.    History of Present Illness     She just finished semaglutide and has lost 25 pounds. Losing weight has helped her knee pain.           Review of Systems        Objective     /62   Pulse 78   Temp (!) 97.3 °F (36.3 °C)   Resp 18   Ht 5' 8\" (1.727 m)   Wt 77.9 kg (171 lb 12.8 oz)   BMI 26.12 kg/m²     Physical Exam  Vitals and nursing note reviewed.   Constitutional:       General: She is not in acute distress.     Appearance: She is well-developed.   HENT:      Head: Normocephalic and atraumatic.      Right Ear: Tympanic membrane normal.      Left Ear: Tympanic membrane normal.   Cardiovascular:      Rate and Rhythm: Normal rate and regular rhythm.      Heart sounds: No murmur heard.  Pulmonary:      Effort: Pulmonary effort is normal. No respiratory distress.      Breath sounds: Normal breath sounds.   Musculoskeletal:      Cervical back: Neck supple.   Neurological:      Mental Status: She is alert.         "

## 2024-09-13 NOTE — ASSESSMENT & PLAN NOTE
Improved significantly with weight loss  Orders:    POCT hemoglobin A1c    Comprehensive metabolic panel; Future    Hemoglobin A1C; Future    Comprehensive metabolic panel    Hemoglobin A1C

## 2024-09-14 DIAGNOSIS — M25.551 BILATERAL HIP PAIN: ICD-10-CM

## 2024-09-14 DIAGNOSIS — M25.552 BILATERAL HIP PAIN: ICD-10-CM

## 2024-09-15 RX ORDER — CELECOXIB 200 MG/1
CAPSULE ORAL
Qty: 30 CAPSULE | Refills: 5 | Status: SHIPPED | OUTPATIENT
Start: 2024-09-15

## 2024-09-23 ENCOUNTER — APPOINTMENT (OUTPATIENT)
Dept: RADIOLOGY | Facility: HOSPITAL | Age: 69
End: 2024-09-23
Payer: COMMERCIAL

## 2024-09-23 ENCOUNTER — HOSPITAL ENCOUNTER (OUTPATIENT)
Facility: AMBULARY SURGERY CENTER | Age: 69
Setting detail: OUTPATIENT SURGERY
Discharge: HOME | End: 2024-09-23
Attending: STUDENT IN AN ORGANIZED HEALTH CARE EDUCATION/TRAINING PROGRAM | Admitting: STUDENT IN AN ORGANIZED HEALTH CARE EDUCATION/TRAINING PROGRAM
Payer: COMMERCIAL

## 2024-09-23 VITALS
OXYGEN SATURATION: 99 % | TEMPERATURE: 96.7 F | SYSTOLIC BLOOD PRESSURE: 163 MMHG | HEART RATE: 67 BPM | RESPIRATION RATE: 18 BRPM | DIASTOLIC BLOOD PRESSURE: 94 MMHG

## 2024-09-23 PROBLEM — M16.0 BILATERAL PRIMARY OSTEOARTHRITIS OF HIP: Status: ACTIVE | Noted: 2024-09-23

## 2024-09-23 PROCEDURE — 77002 NEEDLE LOCALIZATION BY XRAY: CPT | Performed by: STUDENT IN AN ORGANIZED HEALTH CARE EDUCATION/TRAINING PROGRAM

## 2024-09-23 PROCEDURE — NC001 PR NO CHARGE: Performed by: STUDENT IN AN ORGANIZED HEALTH CARE EDUCATION/TRAINING PROGRAM

## 2024-09-23 PROCEDURE — 20610 DRAIN/INJ JOINT/BURSA W/O US: CPT | Performed by: STUDENT IN AN ORGANIZED HEALTH CARE EDUCATION/TRAINING PROGRAM

## 2024-09-23 PROCEDURE — 77002 NEEDLE LOCALIZATION BY XRAY: CPT

## 2024-09-23 RX ORDER — METHYLPREDNISOLONE ACETATE 80 MG/ML
INJECTION, SUSPENSION INTRA-ARTICULAR; INTRALESIONAL; INTRAMUSCULAR; SOFT TISSUE AS NEEDED
Status: DISCONTINUED | OUTPATIENT
Start: 2024-09-23 | End: 2024-09-23 | Stop reason: HOSPADM

## 2024-09-23 RX ORDER — LIDOCAINE HYDROCHLORIDE 10 MG/ML
INJECTION, SOLUTION EPIDURAL; INFILTRATION; INTRACAUDAL; PERINEURAL AS NEEDED
Status: DISCONTINUED | OUTPATIENT
Start: 2024-09-23 | End: 2024-09-23 | Stop reason: HOSPADM

## 2024-09-23 RX ORDER — ROPIVACAINE HYDROCHLORIDE 2 MG/ML
INJECTION, SOLUTION EPIDURAL; INFILTRATION; PERINEURAL AS NEEDED
Status: DISCONTINUED | OUTPATIENT
Start: 2024-09-23 | End: 2024-09-23 | Stop reason: HOSPADM

## 2024-09-23 NOTE — H&P
History of Present Illness: The patient is a 69 y.o. female who presents with complaints of bilateral hip pain.     Past Medical History:   Diagnosis Date    Arthritis     Asymptomatic bacteriuria 04/09/2020    Chest tightness 03/31/2020    COVID-19 virus infection 04/07/2020    H/O antinuclear antibodies     Hallucinations 04/08/2020    Pneumonia due to COVID-19 virus 04/09/2020    SIRS (systemic inflammatory response syndrome) (HCC) 03/31/2020    Urinary tract infection     last assessed: 10/24/2013       Past Surgical History:   Procedure Laterality Date    APPENDECTOMY      COLONOSCOPY      HI ARTHROCENTESIS ASPIR&/INJ MAJOR JT/BURSA W/O US Bilateral 05/24/2024    Procedure: BILATERAL INTRA-ARTICULAR HIP INJECTION;  Surgeon: Otoniel Gallegos MD;  Location: [facility] MAIN OR;  Service: Pain Management        No current facility-administered medications for this encounter.    No Known Allergies    Physical Exam: There were no vitals filed for this visit.  General: Awake, Alert, Oriented x 3, Mood and affect appropriate  Respiratory: Respirations even and unlabored  Cardiovascular: Peripheral pulses intact; no edema  Musculoskeletal Exam: bilateral hip pain.     ASA Score: 3         Assessment: Bilateral hip osteoarthritis     Plan: Bilateral IA hip injections

## 2024-09-23 NOTE — DISCHARGE INSTRUCTIONS

## 2024-09-23 NOTE — OP NOTE
Pre-procedure Diagnosis: Hip osteoarthritis  Post-procedure Diagnosis: Hip osteoarthritis   Operation Title(s):  1. bilateral hip intra-articular steroid injection      2. Intraoperative fluoroscopy  Attending Surgeon:   Otoniel Gallegos MD  Anesthesia:   Local    Indications: The patient is 69 y.o. year-old female with a diagnosis of hip osteoarthritis. The patient's history and physical exam were reviewed.   The risks, benefits and alternatives to the procedure were discussed, and all questions were answered to the patient's satisfaction. The patient agreed to proceed, and written informed consent was obtained.    Procedure in Detail: The patient was brought into the procedure room and placed in the supine position on the fluoroscopy table. The righthip(s) were prepped with chloraprep time two and draped in a sterile manner.     AP fluoroscopy was used to identify and solange the right femoral neck and head. The C-arm was obliqued 20° to displace the femoral nerve in vessels .The skin and subcutaneous tissues in the area was anesthetized with 1% lidocaine. A 22-gauge, 3½-inch spinal needle was advanced toward the identified point under fluoroscopic guidance until bone was contacted and the joint space was entered.  Then, after negative aspiration, 1-ml Omnipaque 240 contrast solution was injected showing an appropriate arthrogram. Then, a solution consisting of 3-mL 0.2% ropivacaine and 1 ml Depo-Medrol (40mg/ml) was easily injected. The needle was removed with a 1% lidocaine flush.     The same procedure was repeated on the opposite side.     The patient's hip(s) was cleaned and a bandage was placed over the sites of needle insertion.    Disposition: The patient tolerated the procedure well and there were no apparent complications.  The patient was taken to the recovery area where written discharge instructions for the procedure were given.    Estimated Blood Loss: None  Specimens Obtained: N/A           02-Mar-2018 07-Mar-2018

## 2025-01-03 ENCOUNTER — TELEPHONE (OUTPATIENT)
Age: 70
End: 2025-01-03

## 2025-01-03 NOTE — TELEPHONE ENCOUNTER
Caller: Patient    Doctor: Sinai Guillen    Reason for call: Patient called to schedule FU apt for BL hips; patient will call back when she is able to schedule.

## 2025-02-06 ENCOUNTER — TELEPHONE (OUTPATIENT)
Age: 70
End: 2025-02-06

## 2025-02-06 NOTE — TELEPHONE ENCOUNTER
Pt called to verify time for injection procedure tomorrow. Advised tentative time listed and that someone would be calling her to confirm.

## 2025-02-06 NOTE — TELEPHONE ENCOUNTER
Patient called asking to speak to [facility].  W[facility] to [facility].

## 2025-02-07 ENCOUNTER — HOSPITAL ENCOUNTER (OUTPATIENT)
Facility: AMBULARY SURGERY CENTER | Age: 70
Setting detail: OUTPATIENT SURGERY
Discharge: HOME | End: 2025-02-07
Attending: STUDENT IN AN ORGANIZED HEALTH CARE EDUCATION/TRAINING PROGRAM | Admitting: STUDENT IN AN ORGANIZED HEALTH CARE EDUCATION/TRAINING PROGRAM
Payer: COMMERCIAL

## 2025-02-07 ENCOUNTER — APPOINTMENT (OUTPATIENT)
Dept: RADIOLOGY | Facility: HOSPITAL | Age: 70
End: 2025-02-07
Payer: COMMERCIAL

## 2025-02-07 VITALS
TEMPERATURE: 96.9 F | RESPIRATION RATE: 18 BRPM | DIASTOLIC BLOOD PRESSURE: 70 MMHG | OXYGEN SATURATION: 97 % | SYSTOLIC BLOOD PRESSURE: 147 MMHG | HEART RATE: 70 BPM

## 2025-02-07 PROCEDURE — NC001 PR NO CHARGE: Performed by: STUDENT IN AN ORGANIZED HEALTH CARE EDUCATION/TRAINING PROGRAM

## 2025-02-07 PROCEDURE — 77002 NEEDLE LOCALIZATION BY XRAY: CPT

## 2025-02-07 PROCEDURE — 20610 DRAIN/INJ JOINT/BURSA W/O US: CPT | Performed by: STUDENT IN AN ORGANIZED HEALTH CARE EDUCATION/TRAINING PROGRAM

## 2025-02-07 PROCEDURE — 77002 NEEDLE LOCALIZATION BY XRAY: CPT | Performed by: STUDENT IN AN ORGANIZED HEALTH CARE EDUCATION/TRAINING PROGRAM

## 2025-02-07 RX ORDER — ROPIVACAINE HYDROCHLORIDE 2 MG/ML
INJECTION, SOLUTION EPIDURAL; INFILTRATION; PERINEURAL AS NEEDED
Status: DISCONTINUED | OUTPATIENT
Start: 2025-02-07 | End: 2025-02-07 | Stop reason: HOSPADM

## 2025-02-07 RX ORDER — METHYLPREDNISOLONE ACETATE 80 MG/ML
INJECTION, SUSPENSION INTRA-ARTICULAR; INTRALESIONAL; INTRAMUSCULAR; SOFT TISSUE AS NEEDED
Status: DISCONTINUED | OUTPATIENT
Start: 2025-02-07 | End: 2025-02-07 | Stop reason: HOSPADM

## 2025-02-07 RX ORDER — LIDOCAINE HYDROCHLORIDE 10 MG/ML
INJECTION, SOLUTION EPIDURAL; INFILTRATION; INTRACAUDAL; PERINEURAL AS NEEDED
Status: DISCONTINUED | OUTPATIENT
Start: 2025-02-07 | End: 2025-02-07 | Stop reason: HOSPADM

## 2025-02-07 NOTE — DISCHARGE INSTRUCTIONS

## 2025-02-07 NOTE — OP NOTE
Pre-procedure Diagnosis: Hip osteoarthritis  Post-procedure Diagnosis: Hip osteoarthritis   Operation Title(s):  1. bilateral hip intra-articular steroid injection      2. Intraoperative fluoroscopy  Attending Surgeon:   Otoniel Gallegos MD  Anesthesia:   Local    Indications: The patient is 69 y.o. year-old female with a diagnosis of hip osteoarthritis. The patient's history and physical exam were reviewed.   The risks, benefits and alternatives to the procedure were discussed, and all questions were answered to the patient's satisfaction. The patient agreed to proceed, and written informed consent was obtained.    Procedure in Detail: The patient was brought into the procedure room and placed in the supine position on the fluoroscopy table. The righthip(s) were prepped with chloraprep time two and draped in a sterile manner.     AP fluoroscopy was used to identify and solange the right femoral neck and head. The C-arm was obliqued 20° to displace the femoral nerve in vessels .The skin and subcutaneous tissues in the area was anesthetized with 1% lidocaine. A 22-gauge, 3½-inch spinal needle was advanced toward the identified point under fluoroscopic guidance until bone was contacted and the joint space was entered.  Then, after negative aspiration, 1-ml Omnipaque 240 contrast solution was injected showing an appropriate arthrogram. Then, a solution consisting of 3-mL 0.2% ropivacaine and 0.5 ml Depo-Medrol (80mg/ml) was easily injected. The needle was removed with a 1% lidocaine flush.     This process was completed on the left side.     The patient's hip(s) was cleaned and a bandage was placed over the sites of needle insertion.    Disposition: The patient tolerated the procedure well and there were no apparent complications.  The patient was taken to the recovery area where written discharge instructions for the procedure were given.    Estimated Blood Loss: None  Specimens Obtained: N/A

## 2025-02-07 NOTE — H&P
History of Present Illness: The patient is a 69 y.o. female who presents with complaints of bilateral hip pain.     Past Medical History:   Diagnosis Date    Arthritis     Asymptomatic bacteriuria 04/09/2020    Chest tightness 03/31/2020    COVID-19 virus infection 04/07/2020    H/O antinuclear antibodies     Hallucinations 04/08/2020    Pneumonia due to COVID-19 virus 04/09/2020    SIRS (systemic inflammatory response syndrome) (HCC) 03/31/2020    Urinary tract infection     last assessed: 10/24/2013       Past Surgical History:   Procedure Laterality Date    APPENDECTOMY      COLONOSCOPY      [place] ARTHROCENTESIS ASPIR&/INJ MAJOR JT/BURSA W/O US Bilateral 05/24/2024    Procedure: BILATERAL INTRA-ARTICULAR HIP INJECTION;  Surgeon: Otoniel Gallegos MD;  Location: [facility] MAIN OR;  Service: Pain Management     [place] ARTHROCENTESIS ASPIR&/INJ MAJOR JT/BURSA W/O US Bilateral 9/23/2024    Procedure: BILATERAL INTRA-ARTICULAR HIP INJECTION;  Surgeon: Otoniel Gallegos MD;  Location: [facility] MAIN OR;  Service: Pain Management        No current facility-administered medications for this encounter.    No Known Allergies    Physical Exam: There were no vitals filed for this visit.  General: Awake, Alert, Oriented x 3, Mood and affect appropriate  Respiratory: Respirations even and unlabored  Cardiovascular: Peripheral pulses intact; no edema  Musculoskeletal Exam: bilateral hip pain.     ASA Score: 3         Assessment: Bilateral hip osteoarthritis     Plan: Bilateral IA hip injections

## 2025-02-21 ENCOUNTER — TELEPHONE (OUTPATIENT)
Dept: PAIN MEDICINE | Facility: CLINIC | Age: 70
End: 2025-02-21

## 2025-03-06 ENCOUNTER — TELEPHONE (OUTPATIENT)
Age: 70
End: 2025-03-06

## 2025-03-06 NOTE — TELEPHONE ENCOUNTER
Caller: Patient     Doctor: Dr. Rawls    Reason for call: Patient called to verify provider for BL hip was Dr. Rawls.    Call back#: [phone]

## 2025-04-23 ENCOUNTER — TELEPHONE (OUTPATIENT)
Age: 70
End: 2025-04-23

## 2025-04-26 DIAGNOSIS — M25.551 BILATERAL HIP PAIN: ICD-10-CM

## 2025-04-26 DIAGNOSIS — M25.552 BILATERAL HIP PAIN: ICD-10-CM

## 2025-04-28 ENCOUNTER — TELEPHONE (OUTPATIENT)
Age: 70
End: 2025-04-28

## 2025-04-28 RX ORDER — CELECOXIB 200 MG/1
CAPSULE ORAL
Qty: 30 CAPSULE | Refills: 5 | Status: SHIPPED | OUTPATIENT
Start: 2025-04-28

## 2025-04-28 NOTE — TELEPHONE ENCOUNTER
Caller: Anais BROWN    Doctor/Office: Dr Johnson     Call regarding :  Procedure      Call was transferred to:

## 2025-04-28 NOTE — TELEPHONE ENCOUNTER
Caller: jett Manzo    Doctor: Dr. Gallegos    Reason for call: pt calling to schedule repeat injection    Call back#: [phone]

## 2025-04-28 NOTE — TELEPHONE ENCOUNTER
Repeat Injections  Injection type: B/L intra articular injection  Last injection date: 2/7/25  Last office visit: referral from ortho  Where is pain located: B/L inner hip toward groin  Is the pain the same area as before: Yes  Current pain level: 7-8/10  How much % of relief did the last injection provide:100%  Duration of relief from last injection: 2 1/4 months  When did pain return: 1 week ago  Is the pain effecting your ADLs: yes     Blood thinners/NSAIDS? N/A  Diabetes? No                   CGM? N/A

## 2025-05-19 ENCOUNTER — OFFICE VISIT (OUTPATIENT)
Dept: FAMILY MEDICINE CLINIC | Facility: CLINIC | Age: 70
End: 2025-05-19
Payer: COMMERCIAL

## 2025-05-19 VITALS
HEIGHT: 68 IN | BODY MASS INDEX: 27.74 KG/M2 | RESPIRATION RATE: 16 BRPM | HEART RATE: 71 BPM | WEIGHT: 183 LBS | DIASTOLIC BLOOD PRESSURE: 90 MMHG | TEMPERATURE: 97.6 F | SYSTOLIC BLOOD PRESSURE: 142 MMHG

## 2025-05-19 DIAGNOSIS — K13.0 ANGULAR CHEILITIS: Primary | ICD-10-CM

## 2025-05-19 DIAGNOSIS — R20.9 UNSPECIFIED DISTURBANCES OF SKIN SENSATION: ICD-10-CM

## 2025-05-19 PROCEDURE — 99214 OFFICE O/P EST MOD 30 MIN: CPT | Performed by: FAMILY MEDICINE

## 2025-05-19 RX ORDER — TRIAMCINOLONE ACETONIDE 0.1 %
1 PASTE (GRAM) DENTAL 2 TIMES DAILY
Qty: 5 G | Refills: 0 | Status: SHIPPED | OUTPATIENT
Start: 2025-05-19

## 2025-05-19 NOTE — PROGRESS NOTES
"Name: Anais Crenshaw      : 1955      MRN: [id number]  Encounter Provider: Chani Jarrett MD  Encounter Date: 2025   Encounter department: [facility]  :  Assessment & Plan  Angular cheilitis  Continue bacitracin to the area. She is following dentist about proper denture fitting. Will make sure she does not have iron def or vitamin B12 def.   Kenalog cream prescribed to help with inflammation in the area.   Orders:    Vitamin B12; Future    Fe+TIBC+Chirag; Future    triamcinolone (KENALOG) 0.1 % oral topical paste; Apply 1 Application topically 2 (two) times a day    Unspecified disturbances of skin sensation    Orders:    Vitamin B12; Future           History of Present Illness   HPI  She noticed irritation on the corners of her mouth with some redness on both sides. She is being fitted for dentures and follows her dentist.   She has tried vaseline to the area as well as bacitracin.   Review of Systems   Constitutional: Negative.    HENT: Negative.     Eyes: Negative.    Respiratory: Negative.     Cardiovascular: Negative.    Gastrointestinal: Negative.    Endocrine: Negative.    Genitourinary: Negative.    Musculoskeletal: Negative.    Skin: Negative.    Allergic/Immunologic: Negative.    Neurological: Negative.    Hematological: Negative.    Psychiatric/Behavioral: Negative.         Objective   /90   Pulse 71   Temp 97.6 °F (36.4 °C)   Resp 16   Ht 5' 8\" (1.727 m)   Wt 83 kg (183 lb)   BMI 27.83 kg/m²      Physical Exam  Constitutional:       General: She is not in acute distress.     Appearance: Normal appearance. She is not ill-appearing, toxic-appearing or diaphoretic.   HENT:      Head: Normocephalic and atraumatic.      Mouth/Throat:      Comments: Erythema and dry scaling skin at corners of lips bilaterally    Eyes:      General:         Right eye: No discharge.         Left eye: No discharge.      Conjunctiva/sclera: Conjunctivae normal.     Pulmonary:      Effort: " Pulmonary effort is normal.     Neurological:      Mental Status: She is alert.     Psychiatric:         Mood and Affect: Mood normal.         Behavior: Behavior normal.         Thought Content: Thought content normal.         Judgment: Judgment normal.

## 2025-05-20 ENCOUNTER — HOSPITAL ENCOUNTER (OUTPATIENT)
Facility: AMBULARY SURGERY CENTER | Age: 70
Setting detail: OUTPATIENT SURGERY
Discharge: HOME | End: 2025-05-20
Attending: STUDENT IN AN ORGANIZED HEALTH CARE EDUCATION/TRAINING PROGRAM | Admitting: STUDENT IN AN ORGANIZED HEALTH CARE EDUCATION/TRAINING PROGRAM
Payer: COMMERCIAL

## 2025-05-20 ENCOUNTER — APPOINTMENT (OUTPATIENT)
Dept: RADIOLOGY | Facility: HOSPITAL | Age: 70
End: 2025-05-20
Payer: COMMERCIAL

## 2025-05-20 VITALS
OXYGEN SATURATION: 99 % | SYSTOLIC BLOOD PRESSURE: 159 MMHG | RESPIRATION RATE: 18 BRPM | DIASTOLIC BLOOD PRESSURE: 85 MMHG | HEART RATE: 76 BPM | TEMPERATURE: 97.6 F

## 2025-05-20 PROCEDURE — 77002 NEEDLE LOCALIZATION BY XRAY: CPT

## 2025-05-20 PROCEDURE — 20610 DRAIN/INJ JOINT/BURSA W/O US: CPT | Performed by: STUDENT IN AN ORGANIZED HEALTH CARE EDUCATION/TRAINING PROGRAM

## 2025-05-20 PROCEDURE — 77002 NEEDLE LOCALIZATION BY XRAY: CPT | Performed by: STUDENT IN AN ORGANIZED HEALTH CARE EDUCATION/TRAINING PROGRAM

## 2025-05-20 RX ORDER — LIDOCAINE HYDROCHLORIDE 10 MG/ML
INJECTION, SOLUTION EPIDURAL; INFILTRATION; INTRACAUDAL; PERINEURAL AS NEEDED
Status: DISCONTINUED | OUTPATIENT
Start: 2025-05-20 | End: 2025-05-20 | Stop reason: HOSPADM

## 2025-05-20 RX ORDER — METHYLPREDNISOLONE ACETATE 80 MG/ML
INJECTION, SUSPENSION INTRA-ARTICULAR; INTRALESIONAL; INTRAMUSCULAR; SOFT TISSUE AS NEEDED
Status: DISCONTINUED | OUTPATIENT
Start: 2025-05-20 | End: 2025-05-20 | Stop reason: HOSPADM

## 2025-05-20 RX ORDER — ROPIVACAINE HYDROCHLORIDE 2 MG/ML
INJECTION, SOLUTION EPIDURAL; INFILTRATION; PERINEURAL AS NEEDED
Status: DISCONTINUED | OUTPATIENT
Start: 2025-05-20 | End: 2025-05-20 | Stop reason: HOSPADM

## 2025-05-20 NOTE — OP NOTE
Pre-procedure Diagnosis: Hip osteoarthritis  Post-procedure Diagnosis: Hip osteoarthritis   Operation Title(s):  1. bilateral hip intra-articular steroid injection      2. Intraoperative fluoroscopy  Attending Surgeon:   Otoniel Gallegos MD  Anesthesia:   Local    Indications: The patient is 69 y.o. year-old female with a diagnosis of hip osteoarthritis. The patient's history and physical exam were reviewed.   The risks, benefits and alternatives to the procedure were discussed, and all questions were answered to the patient's satisfaction. The patient agreed to proceed, and written informed consent was obtained.    Procedure in Detail: The patient was brought into the procedure room and placed in the supine position on the fluoroscopy table. The bilateralhip(s) were prepped with chloraprep time two and draped in a sterile manner.     AP fluoroscopy was used to identify and solange the right femoral neck and head. The C-arm was obliqued 20° to displace the femoral nerve in vessels .The skin and subcutaneous tissues in the area was anesthetized with 1% lidocaine. A 22-gauge, 3½-inch spinal needle was advanced toward the identified point under fluoroscopic guidance until bone was contacted and the joint space was entered.  Then, after negative aspiration, 1-ml Omnipaque 240 contrast solution was injected showing an appropriate arthrogram. Then, a solution consisting of 2-mL 0.2% ropivacaine and 0.5 ml Depo-Medrol (80mg/ml) was easily injected. The needle was removed with a 1% lidocaine flush.     The same sequence was repeated on the opposite side.     The patient's hip(s) was cleaned and a bandage was placed over the sites of needle insertion.    Disposition: The patient tolerated the procedure well and there were no apparent complications.  The patient was taken to the recovery area where written discharge instructions for the procedure were given.    Estimated Blood Loss: None  Specimens Obtained:  N/A

## 2025-05-20 NOTE — H&P
History of Present Illness: The patient is a 69 y.o. female who presents with complaints of bilateral hip pain.     Past Medical History:   Diagnosis Date    Arthritis     Asymptomatic bacteriuria 04/09/2020    Chest tightness 03/31/2020    COVID-19 virus infection 04/07/2020    H/O antinuclear antibodies     Hallucinations 04/08/2020    Pneumonia due to COVID-19 virus 04/09/2020    SIRS (systemic inflammatory response syndrome) (HCC) 03/31/2020    Urinary tract infection     last assessed: 10/24/2013       Past Surgical History:   Procedure Laterality Date    APPENDECTOMY      COLONOSCOPY      NH ARTHROCENTESIS ASPIR&/INJ MAJOR JT/BURSA W/O US Bilateral 05/24/2024    Procedure: BILATERAL INTRA-ARTICULAR HIP INJECTION;  Surgeon: Otoniel Gallegos MD;  Location: [facility] MAIN OR;  Service: Pain Management     NH ARTHROCENTESIS ASPIR&/INJ MAJOR JT/BURSA W/O US Bilateral 9/23/2024    Procedure: BILATERAL INTRA-ARTICULAR HIP INJECTION;  Surgeon: Otoniel Gallegos MD;  Location: [facility] MAIN OR;  Service: Pain Management     NH ARTHROCENTESIS ASPIR&/INJ MAJOR JT/BURSA W/O US Bilateral 2/7/2025    Procedure: BILATERAL INTRA-ARTICULAR HIP INJECTIONS;  Surgeon: Otoniel Gallegos MD;  Location: [facility] MAIN OR;  Service: Pain Management        Current Medications[1]    Allergies[2]    Physical Exam: There were no vitals filed for this visit.  General: Awake, Alert, Oriented x 3, Mood and affect appropriate  Respiratory: Respirations even and unlabored  Cardiovascular: Peripheral pulses intact; no edema  Musculoskeletal Exam: bilateral hip pain.     ASA Score: 3         Assessment: Hip osteoarthritis     Plan: Bilateral Hip injection         [1] No current facility-administered medications for this encounter.  [2] No Known Allergies

## 2025-05-21 ENCOUNTER — RESULTS FOLLOW-UP (OUTPATIENT)
Dept: FAMILY MEDICINE CLINIC | Facility: CLINIC | Age: 70
End: 2025-05-21

## 2025-05-21 LAB
FERRITIN SERPL-MCNC: 105 NG/ML (ref 15–150)
IRON SATN MFR SERPL: 18 % (ref 15–55)
IRON SERPL-MCNC: 65 UG/DL (ref 27–139)
TIBC SERPL-MCNC: 358 UG/DL (ref 250–450)
UIBC SERPL-MCNC: 293 UG/DL (ref 118–369)
VIT B12 SERPL-MCNC: >2000 PG/ML (ref 232–1245)

## 2025-05-21 NOTE — TELEPHONE ENCOUNTER
Pt returned missed call, results given. Pt states she does not currently take vitamin B12 supplements.    Please advise.

## 2025-05-21 NOTE — TELEPHONE ENCOUNTER
Patient called again and wanted to know some more information about her high B12 levels. Patient stated she does not take any vitamins at all so she is wondering why it could possibly be high. Patient stated she normally sees Dr Orr so she would like to know what she thinks. I informed patient that Dr Orr is back in the office on Friday and we will give her a call back when she responds.

## 2025-06-02 ENCOUNTER — OFFICE VISIT (OUTPATIENT)
Dept: FAMILY MEDICINE CLINIC | Facility: CLINIC | Age: 70
End: 2025-06-02
Payer: COMMERCIAL

## 2025-06-02 VITALS
WEIGHT: 184.4 LBS | TEMPERATURE: 98.3 F | SYSTOLIC BLOOD PRESSURE: 140 MMHG | RESPIRATION RATE: 16 BRPM | DIASTOLIC BLOOD PRESSURE: 74 MMHG | HEART RATE: 68 BPM | BODY MASS INDEX: 27.95 KG/M2 | HEIGHT: 68 IN

## 2025-06-02 DIAGNOSIS — R39.9 UTI SYMPTOMS: ICD-10-CM

## 2025-06-02 DIAGNOSIS — N76.0 ACUTE VAGINITIS: ICD-10-CM

## 2025-06-02 DIAGNOSIS — N39.0 ACUTE UTI: Primary | ICD-10-CM

## 2025-06-02 LAB
SL AMB  POCT GLUCOSE, UA: NEGATIVE
SL AMB LEUKOCYTE ESTERASE,UA: ABNORMAL
SL AMB POCT BILIRUBIN,UA: NEGATIVE
SL AMB POCT BLOOD,UA: ABNORMAL
SL AMB POCT CLARITY,UA: ABNORMAL
SL AMB POCT COLOR,UA: YELLOW
SL AMB POCT KETONES,UA: NEGATIVE
SL AMB POCT NITRITE,UA: POSITIVE
SL AMB POCT PH,UA: 5.5
SL AMB POCT SPECIFIC GRAVITY,UA: 1.02
SL AMB POCT URINE PROTEIN: NEGATIVE
SL AMB POCT UROBILINOGEN: ABNORMAL

## 2025-06-02 PROCEDURE — 81003 URINALYSIS AUTO W/O SCOPE: CPT | Performed by: FAMILY MEDICINE

## 2025-06-02 PROCEDURE — 99214 OFFICE O/P EST MOD 30 MIN: CPT | Performed by: FAMILY MEDICINE

## 2025-06-02 RX ORDER — CIPROFLOXACIN 250 MG/1
250 TABLET, FILM COATED ORAL 2 TIMES DAILY
Qty: 6 TABLET | Refills: 0 | Status: SHIPPED | OUTPATIENT
Start: 2025-06-02 | End: 2025-06-05

## 2025-06-02 RX ORDER — FLUCONAZOLE 150 MG/1
150 TABLET ORAL ONCE
Qty: 1 TABLET | Refills: 0 | Status: SHIPPED | OUTPATIENT
Start: 2025-06-02 | End: 2025-06-02

## 2025-06-02 NOTE — ASSESSMENT & PLAN NOTE
Orders:    ciprofloxacin (CIPRO) 250 mg tablet; Take 1 tablet (250 mg total) by mouth 2 (two) times a day for 3 days

## 2025-06-02 NOTE — PROGRESS NOTES
"Name: Anais Crenshaw      : 1955      MRN: [id number]  Encounter Provider: Dax Lane DO  Encounter Date: 2025   Encounter department: [facility]  :  Assessment & Plan  UTI symptoms  UA pos  Abx  hydration  Orders:    POCT urine dip auto non-scope    Acute UTI    Orders:    ciprofloxacin (CIPRO) 250 mg tablet; Take 1 tablet (250 mg total) by mouth 2 (two) times a day for 3 days    Acute vaginitis  F/u if no better  Orders:    fluconazole (DIFLUCAN) 150 mg tablet; Take 1 tablet (150 mg total) by mouth once for 1 dose           History of Present Illness   HPI  Burning noted  Itchy in area also  Few days  Cloudy urine  About 3-4d  No blood seen  No f/c/n/v  Has frequency  Infreq uti    Review of Systems   Constitutional:  Negative for chills and fever.   Genitourinary:  Positive for frequency. Negative for flank pain.       Objective   /74   Pulse 68   Temp 98.3 °F (36.8 °C)   Resp 16   Ht 5' 8\" (1.727 m)   Wt 83.6 kg (184 lb 6.4 oz)   BMI 28.04 kg/m²      Physical Exam  Vitals and nursing note reviewed.   Constitutional:       General: She is not in acute distress.     Appearance: She is well-developed. She is not ill-appearing.   HENT:      Head: Normocephalic.      Right Ear: Tympanic membrane normal.      Left Ear: Tympanic membrane normal.     Eyes:      General: No scleral icterus.     Conjunctiva/sclera: Conjunctivae normal.       Cardiovascular:      Rate and Rhythm: Normal rate and regular rhythm.      Heart sounds: No murmur heard.  Pulmonary:      Effort: Pulmonary effort is normal. No respiratory distress.      Breath sounds: No wheezing.   Abdominal:      Palpations: Abdomen is soft.      Tenderness: There is no right CVA tenderness or left CVA tenderness.     Musculoskeletal:         General: No deformity.      Cervical back: Neck supple.      Right lower leg: No edema.      Left lower leg: No edema.     Skin:     General: Skin is warm and dry.      " Coloration: Skin is not pale.     Neurological:      Mental Status: She is alert.      Motor: No weakness.      Gait: Gait normal.     Psychiatric:         Mood and Affect: Mood normal.         Behavior: Behavior normal.         Thought Content: Thought content normal.

## 2025-06-03 ENCOUNTER — TELEPHONE (OUTPATIENT)
Age: 70
End: 2025-06-03

## 2025-06-03 ENCOUNTER — TELEPHONE (OUTPATIENT)
Dept: PAIN MEDICINE | Facility: MEDICAL CENTER | Age: 70
End: 2025-06-03

## 2025-06-23 ENCOUNTER — OFFICE VISIT (OUTPATIENT)
Dept: FAMILY MEDICINE CLINIC | Facility: CLINIC | Age: 70
End: 2025-06-23
Payer: COMMERCIAL

## 2025-06-23 VITALS
SYSTOLIC BLOOD PRESSURE: 116 MMHG | DIASTOLIC BLOOD PRESSURE: 76 MMHG | WEIGHT: 185 LBS | RESPIRATION RATE: 12 BRPM | OXYGEN SATURATION: 98 % | HEART RATE: 81 BPM | BODY MASS INDEX: 28.13 KG/M2 | TEMPERATURE: 97.5 F

## 2025-06-23 DIAGNOSIS — M54.50 ACUTE RIGHT-SIDED LOW BACK PAIN WITHOUT SCIATICA: Primary | ICD-10-CM

## 2025-06-23 PROCEDURE — 99213 OFFICE O/P EST LOW 20 MIN: CPT | Performed by: FAMILY MEDICINE

## 2025-06-23 RX ORDER — PREDNISONE 20 MG/1
20 TABLET ORAL 2 TIMES DAILY WITH MEALS
Qty: 10 TABLET | Refills: 0 | Status: SHIPPED | OUTPATIENT
Start: 2025-06-23 | End: 2025-06-28

## 2025-06-23 RX ORDER — CYCLOBENZAPRINE HCL 5 MG
5 TABLET ORAL 3 TIMES DAILY PRN
Qty: 20 TABLET | Refills: 0 | Status: SHIPPED | OUTPATIENT
Start: 2025-06-23

## 2025-06-23 NOTE — PROGRESS NOTES
Name: Anais Crenshaw      : 1955      MRN: [id number]  Encounter Provider: Violetta Orr DO  Encounter Date: 2025   Encounter department: [facility]  :  Assessment & Plan  Acute right-sided low back pain without sciatica  If she doesn't improve will go to PHYSICAL THERAPY   Orders:  •  cyclobenzaprine (FLEXERIL) 5 mg tablet; Take 1 tablet (5 mg total) by mouth 3 (three) times a day as needed for muscle spasms  •  predniSONE 20 mg tablet; Take 1 tablet (20 mg total) by mouth in the morning and 1 tablet (20 mg total) at noon. Take with meals. Do all this for 5 days. Take with food; do not take Celebrex while on this medication.      Assessment & Plan      Return if symptoms worsen or fail to improve.     History of Present Illness   She has been having pain in her right low back for the past 4-5 days.  She has been taking ibuprofen and tylenol with no relief. The pain is not radiating.  The pain is tight.   Heat helps a little bit.       Review of Systems    Objective   /76   Pulse 81   Temp 97.5 °F (36.4 °C)   Resp 12   Wt 83.9 kg (185 lb)   SpO2 98%   BMI 28.13 kg/m²      Physical Exam  Vitals and nursing note reviewed.   Constitutional:       General: She is not in acute distress.     Appearance: She is well-developed.   HENT:      Head: Normocephalic and atraumatic.      Right Ear: Tympanic membrane normal.      Left Ear: Tympanic membrane normal.     Cardiovascular:      Rate and Rhythm: Normal rate and regular rhythm.      Heart sounds: No murmur heard.  Pulmonary:      Effort: Pulmonary effort is normal. No respiratory distress.      Breath sounds: Normal breath sounds.     Musculoskeletal:         General: Tenderness (right low back, negative straight leg raising.) present.      Cervical back: Neck supple.     Skin:     Findings: No rash.     Neurological:      Mental Status: She is alert.

## 2025-07-02 PROBLEM — N39.0 ACUTE UTI: Status: RESOLVED | Noted: 2025-06-02 | Resolved: 2025-07-02

## 2025-07-22 ENCOUNTER — TELEPHONE (OUTPATIENT)
Age: 70
End: 2025-07-22

## 2025-07-22 NOTE — TELEPHONE ENCOUNTER
Caller: Patient     Doctor/Office: Dr FALK    Call regarding :  Last injection      Call was transferred to: [facility]

## 2025-08-06 ENCOUNTER — TELEPHONE (OUTPATIENT)
Age: 70
End: 2025-08-06

## 2025-08-14 ENCOUNTER — OFFICE VISIT (OUTPATIENT)
Dept: OBGYN CLINIC | Facility: CLINIC | Age: 70
End: 2025-08-14
Payer: COMMERCIAL

## 2025-08-14 ENCOUNTER — APPOINTMENT (OUTPATIENT)
Dept: RADIOLOGY | Facility: CLINIC | Age: 70
End: 2025-08-14
Attending: ORTHOPAEDIC SURGERY
Payer: COMMERCIAL

## 2025-08-19 ENCOUNTER — TELEPHONE (OUTPATIENT)
Age: 70
End: 2025-08-19

## (undated) DEVICE — WIPES BABY PAMPERS SENSITIVE 36/PK

## (undated) DEVICE — NEEDLE SPINAL 22G X 3.5 IN PLST HUB

## (undated) DEVICE — GLOVE SRG LF STRL BGL SKNSNS 7.5 PF

## (undated) DEVICE — FLEXIBLE ADHESIVE BANDAGE,X-LARGE: Brand: CURITY

## (undated) DEVICE — TOWEL SET X-RAY

## (undated) DEVICE — TRAY PAIN SUPPORT

## (undated) DEVICE — PLASTIC ADHESIVE BANDAGE: Brand: CURITY